# Patient Record
Sex: FEMALE | Race: WHITE | Employment: UNEMPLOYED | URBAN - METROPOLITAN AREA
[De-identification: names, ages, dates, MRNs, and addresses within clinical notes are randomized per-mention and may not be internally consistent; named-entity substitution may affect disease eponyms.]

---

## 2017-01-05 DIAGNOSIS — F41.9 ANXIETY: ICD-10-CM

## 2017-01-09 DIAGNOSIS — F41.9 ANXIETY: ICD-10-CM

## 2017-01-09 NOTE — TELEPHONE ENCOUNTER
Michael Merchant    [phone]    Ms. Sewell is checking the status of her Xanax refill. She   States that [employer] is sending another request. She  Had to go all weekend without her medicine. She states that she needs this to be filled today.

## 2017-01-09 NOTE — TELEPHONE ENCOUNTER
LOV 08/10/2016  NOV not scheduled    Last several monthly refills have been from NP [address], routed to Dr. Karyn Fraser due to controlled sub-possible need for contract. LOV with  02/08/2016. The Prescription Monitoring Program has been reviewed for recent activity regarding controlled substances for this patient.      12/05/2016 1 12/05/2016 ALPRAZOLAM 0.5 MG TABLET 60.0 30 SO LOPEZ [id number] [facility] (7290) 0  [place] [facility] [place]  11/07/2016 1 11/07/2016 ALPRAZOLAM 0.5 MG TABLET 60 30 So Lopez [id number] [facility] (7290) [place] [facility] [place]  10/07/2016 1 10/07/2016 ALPRAZOLAM 0.5 MG TABLET 60 30 So Lopez [id number] [facility] (7290) [place]  09/06/2016 1 09/06/2016 ALPRAZOLAM 0.5 MG TABLET 60 30 So Lopez [id number] [facility] (7290) [place]  08/10/2016 1 08/10/2016 ALPRAZOLAM 0.5 MG TABLET 60 30 So Lopez [id number] [facility] (7290) [place]  [place]  06/27/2016 1 05/31/2016 ALPRAZOLAM 0.5 MG TABLET 60 30 [facility] [id number] [facility] (7290) [place]  [place]

## 2017-01-10 RX ORDER — ALPRAZOLAM 0.5 MG/1
TABLET ORAL
Qty: 60 TAB | Refills: 0 | Status: SHIPPED | OUTPATIENT
Start: 2017-01-10 | End: 2017-01-13 | Stop reason: SDUPTHER

## 2017-01-10 RX ORDER — ALPRAZOLAM 0.5 MG/1
TABLET ORAL
Qty: 60 TAB | Refills: 0 | Status: SHIPPED | OUTPATIENT
Start: 2017-01-10 | End: 2017-02-06 | Stop reason: SDUPTHER

## 2017-02-06 DIAGNOSIS — F41.9 ANXIETY: ICD-10-CM

## 2017-02-06 RX ORDER — ALPRAZOLAM 0.5 MG/1
TABLET ORAL
Qty: 60 TAB | Refills: 0 | Status: SHIPPED | OUTPATIENT
Start: 2017-02-06 | End: 2017-03-07 | Stop reason: SDUPTHER

## 2017-03-07 DIAGNOSIS — F41.9 ANXIETY: ICD-10-CM

## 2017-03-07 RX ORDER — ALPRAZOLAM 0.5 MG/1
TABLET ORAL
Qty: 60 TAB | Refills: 0 | Status: SHIPPED | OUTPATIENT
Start: 2017-03-07 | End: 2017-04-05 | Stop reason: SDUPTHER

## 2017-03-27 ENCOUNTER — TELEPHONE (OUTPATIENT)
Dept: FAMILY MEDICINE CLINIC | Age: 47
End: 2017-03-27

## 2017-03-27 RX ORDER — ESCITALOPRAM OXALATE 20 MG/1
TABLET ORAL
Qty: 90 TAB | Refills: 0 | Status: SHIPPED | OUTPATIENT
Start: 2017-03-27 | End: 2017-07-09 | Stop reason: SDUPTHER

## 2017-03-27 NOTE — TELEPHONE ENCOUNTER
[phone] spoke to patient advised that only thing we have for Dr Alexis Amaya is 4/5/2017 at 5:45p offer appointment with another provider but declined per patient if her symptoms get worse she will go to ER but want to see Dr Alexis Amaya 4/5/2017

## 2017-03-27 NOTE — TELEPHONE ENCOUNTER
Joleen Leong  [phone]    Serena's sister, Alek Fernández (on the hipaa) , called. She states that Vika Adams has been trying to get in with Dr. Cherelle Pacheco. She tried calling at 7 am hoping to get a same day sick appointment but wasn't able. She states her sister has a cough, a sore throat, fever and chills. She recently recovered from lung cancer and only wants to see Dr. Cherelle Pacheco. She was offered an appointment with another provider but declined. She is requesting a work in appointment.

## 2017-04-05 ENCOUNTER — OFFICE VISIT (OUTPATIENT)
Dept: FAMILY MEDICINE CLINIC | Age: 47
End: 2017-04-05

## 2017-04-05 VITALS
OXYGEN SATURATION: 95 % | WEIGHT: 159.8 LBS | HEART RATE: 85 BPM | SYSTOLIC BLOOD PRESSURE: 136 MMHG | TEMPERATURE: 98.6 F | HEIGHT: 64 IN | DIASTOLIC BLOOD PRESSURE: 76 MMHG | RESPIRATION RATE: 15 BRPM | BODY MASS INDEX: 27.28 KG/M2

## 2017-04-05 DIAGNOSIS — R53.83 MALAISE AND FATIGUE: ICD-10-CM

## 2017-04-05 DIAGNOSIS — E78.00 HYPERCHOLESTEROLEMIA: ICD-10-CM

## 2017-04-05 DIAGNOSIS — I10 HTN (HYPERTENSION), BENIGN: Primary | ICD-10-CM

## 2017-04-05 DIAGNOSIS — Z12.31 VISIT FOR SCREENING MAMMOGRAM: ICD-10-CM

## 2017-04-05 DIAGNOSIS — F41.9 ANXIETY: ICD-10-CM

## 2017-04-05 DIAGNOSIS — R53.81 MALAISE AND FATIGUE: ICD-10-CM

## 2017-04-05 DIAGNOSIS — Z72.0 TOBACCO ABUSE: ICD-10-CM

## 2017-04-05 RX ORDER — ALPRAZOLAM 0.5 MG/1
TABLET ORAL
Qty: 60 TAB | Refills: 2 | Status: SHIPPED | OUTPATIENT
Start: 2017-04-05 | End: 2017-06-21 | Stop reason: SDUPTHER

## 2017-04-05 RX ORDER — LISINOPRIL 10 MG/1
10 TABLET ORAL DAILY
Qty: 30 TAB | Refills: 5 | Status: SHIPPED | OUTPATIENT
Start: 2017-04-05 | End: 2017-11-11 | Stop reason: SDUPTHER

## 2017-04-05 RX ORDER — SIMVASTATIN 40 MG/1
40 TABLET, FILM COATED ORAL
Qty: 30 TAB | Refills: 0 | Status: SHIPPED | OUTPATIENT
Start: 2017-04-05 | End: 2017-05-10 | Stop reason: SDUPTHER

## 2017-04-05 NOTE — PROGRESS NOTES
Chief Complaint   Patient presents with    Hypertension    Cholesterol Problem       1. Have you been to the ER, urgent care clinic since your last visit? Hospitalized since your last visit? No    2. Have you seen or consulted any other health care providers outside of the [facility] since your last visit? Include any pap smears or colon screening. No    Body mass index is 27.43 kg/(m^2).     PHQ 2 / 9, over the last two weeks 4/5/2017   Little interest or pleasure in doing things Not at all   Feeling down, depressed or hopeless Not at all   Total Score PHQ 2 0

## 2017-04-05 NOTE — MR AVS SNAPSHOT
Visit Information Date & Time Provider Department Dept. Phone Encounter #  
 4/5/2017  5:45 PM Loyda Cortez MD [address] [place] [address] [phone] [id number] Follow-up Instructions Return in about 1 month (around 5/5/2017) for hypertension follow up. Upcoming Health Maintenance Date Due Pneumococcal 19-64 Highest Risk (1 of 3 - PCV13) 4/5/2021* PAP AKA CERVICAL CYTOLOGY 2/8/2019 DTaP/Tdap/Td series (2 - Td) 8/10/2026 *Topic was postponed. The date shown is not the original due date. Allergies as of 4/5/2017  Review Complete On: 4/5/2017 By: Loyda Cortez MD  
  
 Severity Noted Reaction Type Reactions Amoxil [Amoxicillin]  02/06/2013    Diarrhea Codeine  01/11/2011    Nausea and Vomiting Current Immunizations  Reviewed on 4/17/2013 Name Date Td 2/10/2014 Not reviewed this visit You Were Diagnosed With   
  
 Codes Comments HTN (hypertension), benign    -  Primary ICD-10-CM: I10 
ICD-9-CM: 401.1 Hypercholesterolemia     ICD-10-CM: E78.00 ICD-9-CM: 272.0 Tobacco abuse     ICD-10-CM: Z72.0 ICD-9-CM: 305.1 Anxiety     ICD-10-CM: F41.9 ICD-9-CM: 300.00 Visit for screening mammogram     ICD-10-CM: Z12.31 
ICD-9-CM: V76.12 Malaise and fatigue     ICD-10-CM: R53.81, R53.83 ICD-9-CM: 780.79 Vitals BP Pulse Temp Resp Height(growth percentile) Weight(growth percentile) 136/76 85 98.6 °F (37 °C) (Oral) 15 5' 4\" (1.626 m) 159 lb 12.8 oz (72.5 kg) SpO2 BMI OB Status Smoking Status 95% 27.43 kg/m2 Hysterectomy Current Every Day Smoker Vitals History BMI and BSA Data Body Mass Index Body Surface Area  
 27.43 kg/m 2 1.81 m 2 Preferred Pharmacy Pharmacy Name Phone [address], [facility] [address] [phone] Your Updated Medication List  
  
   
 This list is accurate as of: 4/5/17  6:39 PM.  Always use your most recent med list.  
  
  
  
  
 ALPRAZolam 0.5 mg tablet Commonly known as:  XANAX  
TAKE 1 TABLET BY MOUTH TWICE DAILY AS NEEDED FOR ANXIETY  
  
 aspirin 325 mg tablet Commonly known as:  ASPIRIN Take 325 mg by mouth daily. diclofenac EC 75 mg EC tablet Commonly known as:  VOLTAREN  
TAKE 1 TABLET BY MOUTH EVERY DAY  
  
 escitalopram oxalate 20 mg tablet Commonly known as:  Jimmye Davidson TAKE 1 TABLET BY MOUTH EVERY DAY FOR ANXIETY  
  
 fenofibrate micronized 134 mg capsule Commonly known as:  LOFIBRA TAKE 1 CAPSULE BY MOUTH EVERY DAY  
  
 lisinopril 10 mg tablet Commonly known as:  Tanda Limerick Take 1 Tab by mouth daily. metoclopramide HCl 5 mg tablet Commonly known as:  REGLAN  
TAKE 1 TABLET BY MOUTH THREE TIMES DAILY BEFORE MEALS  
  
 omeprazole 20 mg capsule Commonly known as:  PriLOSEC Take 1 Cap by mouth daily. prochlorperazine 10 mg tablet Commonly known as:  COMPAZINE Take 1 Tab by mouth every six (6) hours as needed for Nausea. simvastatin 40 mg tablet Commonly known as:  ZOCOR Take 1 Tab by mouth nightly. Prescriptions Printed Refills ALPRAZolam (XANAX) 0.5 mg tablet 2 Sig: TAKE 1 TABLET BY MOUTH TWICE DAILY AS NEEDED FOR ANXIETY Class: Print Prescriptions Sent to Pharmacy Refills  
 simvastatin (ZOCOR) 40 mg tablet 0 Sig: Take 1 Tab by mouth nightly. Class: Normal  
 Pharmacy: [address] Ph #: [phone] Route: Oral  
 lisinopril (PRINIVIL, ZESTRIL) 10 mg tablet 5 Sig: Take 1 Tab by mouth daily. Class: Normal  
 [address] Ph #: [phone] Route: Oral  
  
We Performed the Following CBC WITH AUTOMATED DIFF [02859 CPT(R)] LIPID PANEL [15770 CPT(R)] METABOLIC PANEL, COMPREHENSIVE [38838 CPT(R)] T4, FREE [id number] CPT(R)] TSH 3RD GENERATION [49072 CPT(R)] Follow-up Instructions Return in about 1 month (around 5/5/2017) for hypertension follow up. To-Do List   
 04/05/2017 Imaging:  [facility] MAMMO BI SCREENING INCL CAD Introducing [facility]! Desire Juan introduces [employer] patient portal. Now you can access parts of your medical record, email your doctor's office, and request medication refills online. 1. In your internet browser, go to [email or web address] 2. Click on the First Time User? Click Here link in the Sign In box. You will see the New Member Sign Up page. 3. Enter your [employer] Access Code exactly as it appears below. You will not need to use this code after youve completed the sign-up process. If you do not sign up before the expiration date, you must request a new code. · [employer] Access Code: [email or web address] Expires: 7/4/2017  5:45 PM 
 
4. Enter the last four digits of your Social Security Number (xxxx) and Date of Birth (mm/dd/yyyy) as indicated and click Submit. You will be taken to the next sign-up page. 5. Create a [employer] ID. This will be your [employer] login ID and cannot be changed, so think of one that is secure and easy to remember. 6. Create a [employer] password. You can change your password at any time. 7. Enter your Password Reset Question and Answer. This can be used at a later time if you forget your password. 8. Enter your e-mail address. You will receive e-mail notification when new information is available in [address] 9. Click Sign Up. You can now view and download portions of your medical record. 10. Click the Download Summary menu link to download a portable copy of your medical information.  
 
If you have questions, please visit the Frequently Asked Questions section of the [employer]. Remember, [employer] is NOT to be used for urgent needs. For medical emergencies, dial 911. Now available from your iPhone and Android! Please provide this summary of care documentation to your next provider. Your primary care clinician is listed as Ezio Vu. If you have any questions after today's visit, please call [phone].
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
## 2017-05-10 DIAGNOSIS — E78.00 HYPERCHOLESTEROLEMIA: ICD-10-CM

## 2017-05-11 RX ORDER — SIMVASTATIN 40 MG/1
TABLET, FILM COATED ORAL
Qty: 30 TAB | Refills: 0 | Status: SHIPPED | OUTPATIENT
Start: 2017-05-11 | End: 2017-06-07 | Stop reason: SDUPTHER

## 2017-05-16 ENCOUNTER — DOCUMENTATION ONLY (OUTPATIENT)
Dept: FAMILY MEDICINE CLINIC | Age: 47
End: 2017-05-16

## 2017-06-07 DIAGNOSIS — E78.00 HYPERCHOLESTEROLEMIA: ICD-10-CM

## 2017-06-08 RX ORDER — SIMVASTATIN 40 MG/1
TABLET, FILM COATED ORAL
Qty: 30 TAB | Refills: 0 | Status: SHIPPED | OUTPATIENT
Start: 2017-06-08 | End: 2017-07-09 | Stop reason: SDUPTHER

## 2017-06-15 RX ORDER — METOCLOPRAMIDE 5 MG/1
TABLET ORAL
Qty: 90 TAB | Refills: 0 | Status: SHIPPED | OUTPATIENT
Start: 2017-06-15 | End: 2017-08-21 | Stop reason: SDUPTHER

## 2017-06-21 DIAGNOSIS — F41.9 ANXIETY: ICD-10-CM

## 2017-06-22 RX ORDER — ALPRAZOLAM 0.5 MG/1
TABLET ORAL
Qty: 60 TAB | Refills: 0 | Status: SHIPPED | OUTPATIENT
Start: 2017-06-22 | End: 2017-09-05 | Stop reason: SDUPTHER

## 2017-06-22 RX ORDER — DICLOFENAC SODIUM 75 MG/1
75 TABLET, DELAYED RELEASE ORAL DAILY
Qty: 30 TAB | Refills: 5 | Status: SHIPPED | OUTPATIENT
Start: 2017-06-22 | End: 2018-09-17 | Stop reason: SDUPTHER

## 2017-07-09 DIAGNOSIS — E78.00 HYPERCHOLESTEROLEMIA: ICD-10-CM

## 2017-07-11 RX ORDER — SIMVASTATIN 40 MG/1
TABLET, FILM COATED ORAL
Qty: 30 TAB | Refills: 0 | Status: SHIPPED | OUTPATIENT
Start: 2017-07-11 | End: 2017-08-09 | Stop reason: SDUPTHER

## 2017-07-11 RX ORDER — ESCITALOPRAM OXALATE 20 MG/1
TABLET ORAL
Qty: 90 TAB | Refills: 1 | Status: SHIPPED | OUTPATIENT
Start: 2017-07-11 | End: 2018-01-15 | Stop reason: SDUPTHER

## 2017-08-21 ENCOUNTER — OFFICE VISIT (OUTPATIENT)
Dept: FAMILY MEDICINE CLINIC | Age: 47
End: 2017-08-21

## 2017-08-21 VITALS
DIASTOLIC BLOOD PRESSURE: 70 MMHG | WEIGHT: 165.6 LBS | HEIGHT: 64 IN | RESPIRATION RATE: 16 BRPM | SYSTOLIC BLOOD PRESSURE: 115 MMHG | HEART RATE: 85 BPM | OXYGEN SATURATION: 97 % | TEMPERATURE: 97.7 F | BODY MASS INDEX: 28.27 KG/M2

## 2017-08-21 DIAGNOSIS — K21.9 GASTROESOPHAGEAL REFLUX DISEASE WITHOUT ESOPHAGITIS: ICD-10-CM

## 2017-08-21 DIAGNOSIS — I25.83 CORONARY ARTERY DISEASE DUE TO LIPID RICH PLAQUE: ICD-10-CM

## 2017-08-21 DIAGNOSIS — R53.81 MALAISE AND FATIGUE: ICD-10-CM

## 2017-08-21 DIAGNOSIS — C34.11 MALIGNANT NEOPLASM OF UPPER LOBE OF RIGHT LUNG (HCC): ICD-10-CM

## 2017-08-21 DIAGNOSIS — Z72.0 TOBACCO ABUSE: ICD-10-CM

## 2017-08-21 DIAGNOSIS — E66.3 OVER WEIGHT: ICD-10-CM

## 2017-08-21 DIAGNOSIS — E27.8 ADRENAL MASS (HCC): ICD-10-CM

## 2017-08-21 DIAGNOSIS — I10 HTN (HYPERTENSION), BENIGN: Primary | ICD-10-CM

## 2017-08-21 DIAGNOSIS — F32.89 DEPRESSIVE DISORDER, NOT ELSEWHERE CLASSIFIED: ICD-10-CM

## 2017-08-21 DIAGNOSIS — E78.00 HYPERCHOLESTEROLEMIA: ICD-10-CM

## 2017-08-21 DIAGNOSIS — R53.83 MALAISE AND FATIGUE: ICD-10-CM

## 2017-08-21 DIAGNOSIS — R51.9 INTRACTABLE HEADACHE, UNSPECIFIED CHRONICITY PATTERN, UNSPECIFIED HEADACHE TYPE: ICD-10-CM

## 2017-08-21 DIAGNOSIS — F41.9 ANXIETY: ICD-10-CM

## 2017-08-21 DIAGNOSIS — I25.10 CORONARY ARTERY DISEASE DUE TO LIPID RICH PLAQUE: ICD-10-CM

## 2017-08-21 RX ORDER — METOCLOPRAMIDE 5 MG/1
TABLET ORAL
Qty: 90 TAB | Refills: 5 | Status: SHIPPED | OUTPATIENT
Start: 2017-08-21 | End: 2018-11-28 | Stop reason: SDUPTHER

## 2017-08-21 NOTE — PROGRESS NOTES
Chief Complaint   Patient presents with    Hypertension     follow up     Cholesterol Problem     follow up     1. Have you been to the ER, urgent care clinic since your last visit? Hospitalized since your last visit? No    2. Have you seen or consulted any other health care providers outside of the [address] [place] [address] since your last visit? Include any pap smears or colon screening.  Yes Ovidio Mai Dr. Johnson Rinne    Fasting
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
## 2017-08-21 NOTE — MR AVS SNAPSHOT
Visit Information Date & Time Provider Department Dept. Phone Encounter #  
 8/21/2017  9:45 AM Tiffanie Davidson MD [facility] [phone] [id number] Follow-up Instructions Return in about 6 months (around 2/21/2018) for hypertension follow up, hyperlipidemia follow up. Upcoming Health Maintenance Date Due Pneumococcal 19-64 Highest Risk (1 of 3 - PCV13) 4/5/2021* PAP AKA CERVICAL CYTOLOGY 2/8/2019 DTaP/Tdap/Td series (2 - Td) 8/10/2026 *Topic was postponed. The date shown is not the original due date. Allergies as of 8/21/2017  Review Complete On: 8/21/2017 By: Tiffanie Davidson MD  
  
 Severity Noted Reaction Type Reactions Amoxil [Amoxicillin]  02/06/2013    Diarrhea Codeine  01/11/2011    Nausea and Vomiting Current Immunizations  Reviewed on 4/17/2013 Name Date Td 2/10/2014 Not reviewed this visit You Were Diagnosed With   
  
 Codes Comments HTN (hypertension), benign    -  Primary ICD-10-CM: I10 
ICD-9-CM: 401.1 Hypercholesterolemia     ICD-10-CM: E78.00 ICD-9-CM: 272.0 Coronary artery disease due to lipid rich plaque     ICD-10-CM: I25.10, I25.83 ICD-9-CM: 414.00, 414.3 Depressive disorder, not elsewhere classified     ICD-10-CM: F32.9 ICD-9-CM: 243 Anxiety     ICD-10-CM: F41.9 ICD-9-CM: 300.00 Tobacco abuse     ICD-10-CM: Z72.0 ICD-9-CM: 305.1 Malignant neoplasm of upper lobe of right lung [facility])     ICD-10-CM: C34.11 ICD-9-CM: 162.3 Adrenal mass ([facility] [address]     ICD-10-CM: E27.9 ICD-9-CM: 255.9 Gastroesophageal reflux disease without esophagitis     ICD-10-CM: K21.9 ICD-9-CM: 530.81 Malaise and fatigue     ICD-10-CM: R53.81, R53.83 ICD-9-CM: 780.79 Intractable headache, unspecified chronicity pattern, unspecified headache type     ICD-10-CM: R51 ICD-9-CM: 784.0 Over weight     ICD-10-CM: U49.3 ICD-9-CM: 278.02 Vitals BP Pulse Temp Resp Height(growth percentile) Weight(growth percentile) 115/70 (BP 1 Location: Left arm, BP Patient Position: Sitting) 85 97.7 °F (36.5 °C) (Oral) 16 5' 4\" (1.626 m) 165 lb 9.6 oz (75.1 kg) SpO2 BMI OB Status Smoking Status 97% 28.43 kg/m2 Hysterectomy Current Every Day Smoker Vitals History BMI and BSA Data Body Mass Index Body Surface Area  
 28.43 kg/m 2 1.84 m 2 Preferred Pharmacy Pharmacy Name Phone [address], Formerly named [facility] [address] [phone] Your Updated Medication List  
  
   
This list is accurate as of: 8/21/17 11:03 AM.  Always use your most recent med list.  
  
  
  
  
 ALPRAZolam 0.5 mg tablet Commonly known as:  XANAX  
TAKE 1 TABLET BY MOUTH TWICE DAILY AS NEEDED FOR ANXIETY  
  
 aspirin 325 mg tablet Commonly known as:  ASPIRIN Take 325 mg by mouth daily. diclofenac EC 75 mg EC tablet Commonly known as:  VOLTAREN Take 1 Tab by mouth daily. escitalopram oxalate 20 mg tablet Commonly known as:  Jose G Snow TAKE 1 TABLET BY MOUTH EVERY DAY AS NEEDED FOR ANXIETY  
  
 lisinopril 10 mg tablet Commonly known as:  Shalini Ann Take 1 Tab by mouth daily. metoclopramide HCl 5 mg tablet Commonly known as:  REGLAN  
TAKE 1 TABLET BY MOUTH THREE TIMES DAILY BEFORE MEALS  
  
 omeprazole 20 mg capsule Commonly known as:  PriLOSEC Take 1 Cap by mouth daily. prochlorperazine 10 mg tablet Commonly known as:  COMPAZINE Take 1 Tab by mouth every six (6) hours as needed for Nausea. simvastatin 40 mg tablet Commonly known as:  ZOCOR  
TAKE 1 TABLET BY MOUTH EVERY NIGHT Prescriptions Sent to Pharmacy Refills  
 metoclopramide HCl (REGLAN) 5 mg tablet 5 Sig: TAKE 1 TABLET BY MOUTH THREE TIMES DAILY BEFORE MEALS  Class: Normal  
 Pharmacy: [address] [place] [address] [address] Ph #: [phone] We Performed the Following CBC WITH AUTOMATED DIFF [29211 CPT(R)] LIPID PANEL [19223 CPT(R)] METABOLIC PANEL, COMPREHENSIVE [87126 CPT(R)] T4, FREE [id number] CPT(R)] TSH 3RD GENERATION [51429 CPT(R)] Follow-up Instructions Return in about 6 months (around 2/21/2018) for hypertension follow up, hyperlipidemia follow up. To-Do List   
 08/21/2017 Imaging:  CT HEAD WO CONT Referral Information Referral ID Referred By Referred To  
  
 [id number] Sheela Conde Not Available Visits Status Start Date End Date 1 New Request 8/21/17 8/21/18 If your referral has a status of pending review or denied, additional information will be sent to support the outcome of this decision. Patient Instructions Learning About Coronary Artery Disease (CAD) What is coronary artery disease? Coronary artery disease (CAD) occurs when plaque builds up in the arteries that bring oxygen-rich blood to your heart. Plaque is a fatty substance made of cholesterol, calcium, and other substances in the blood. This process is called hardening of the arteries, or atherosclerosis. What happens when you have coronary artery disease? · Plaque may narrow the coronary arteries. Narrowed arteries cause poor blood flow. This can lead to angina symptoms such as chest pain or discomfort. If blood flow is completely blocked, you could have a heart attack. · You can slow CAD and reduce the risk of future problems by making changes in your lifestyle. These include quitting smoking and eating heart-healthy foods. · Treatments for CAD, along with changes in your lifestyle, can help you live a longer and healthier life. How can you prevent coronary artery disease? · Do not smoke. It may be the best thing you can do to prevent heart disease.  If you need help quitting, talk to your doctor about stop-smoking programs and medicines. These can increase your chances of quitting for good. · Be active. Get at least 30 minutes of exercise on most days of the week. Walking is a good choice. You also may want to do other activities, such as running, swimming, cycling, or playing tennis or team sports. · Eat heart-healthy foods. Eat more fruits and vegetables and less foods that contain saturated and trans fats. Limit alcohol, sodium, and sweets. · Stay at a healthy weight. Lose weight if you need to. · Manage other health problems such as diabetes, high blood pressure, and high cholesterol. · Manage stress. Stress can hurt your heart. To keep stress low, talk about your problems and feelings. Don't keep your feelings hidden. · If you have talked about it with your doctor, take a low-dose aspirin every day. Aspirin can help certain people lower their risk of a heart attack or stroke. But taking aspirin isn't right for everyone, because it can cause serious bleeding. Do not start taking daily aspirin unless your doctor knows about it. How is coronary artery disease treated? · Your doctor will suggest that you make lifestyle changes. For example, your doctor may ask you to eat healthy foods, quit smoking, lose extra weight, and be more active. · You will have to take medicines. · Your doctor may suggest a procedure to open narrowed or blocked arteries. This is called angioplasty. Or your doctor may suggest using healthy blood vessels to create detours around narrowed or blocked arteries. This is called bypass surgery. Follow-up care is a key part of your treatment and safety. Be sure to make and go to all appointments, and call your doctor if you are having problems. It's also a good idea to know your test results and keep a list of the medicines you take. Where can you learn more? Go to http://yuliya-bryan.info/.  
Enter [phone] in the search box to learn more about \"Learning About Coronary Artery Disease (CAD). \" Current as of: December 28, 2016 Content Version: 11.3 © 5104-8952 Ecelles Carson. Care instructions adapted under license by [employer] (which disclaims liability or warranty for this information). If you have questions about a medical condition or this instruction, always ask your healthcare professional. [address]  any warranty or liability for your use of this information. Introducing [facility]! Mary Arciniega introduces [employer] patient portal. Now you can access parts of your medical record, email your doctor's office, and request medication refills online. 1. In your internet browser, go to [email or web address] 2. Click on the First Time User? Click Here link in the Sign In box. You will see the New Member Sign Up page. 3. Enter your [employer] Access Code exactly as it appears below. You will not need to use this code after youve completed the sign-up process. If you do not sign up before the expiration date, you must request a new code. · [employer] Access Code: [email or web address] Expires: 11/19/2017 10:55 AM 
 
4. Enter the last four digits of your Social Security Number (xxxx) and Date of Birth (mm/dd/yyyy) as indicated and click Submit. You will be taken to the next sign-up page. 5. Create a [employer] ID. This will be your [employer] login ID and cannot be changed, so think of one that is secure and easy to remember. 6. Create a [employer] password. You can change your password at any time. 7. Enter your Password Reset Question and Answer. This can be used at a later time if you forget your password. 8. Enter your e-mail address. You will receive e-mail notification when new information is available in [address] 9. Click Sign Up. You can now view and download portions of your medical record. 10. Click the Download Summary menu link to download a portable copy of your medical information. If you have questions, please visit the Frequently Asked Questions section of the [employer] website. Remember, [employer] is NOT to be used for urgent needs. For medical emergencies, dial 911. Now available from your iPhone and Android! Please provide this summary of care documentation to your next provider. Your primary care clinician is listed as Hemalatha Wyatt. If you have any questions after today's visit, please call [phone].

## 2017-08-21 NOTE — PATIENT INSTRUCTIONS
Learning About Coronary Artery Disease (CAD)  What is coronary artery disease? Coronary artery disease (CAD) occurs when plaque builds up in the arteries that bring oxygen-rich blood to your heart. Plaque is a fatty substance made of cholesterol, calcium, and other substances in the blood. This process is called hardening of the arteries, or atherosclerosis. What happens when you have coronary artery disease? · Plaque may narrow the coronary arteries. Narrowed arteries cause poor blood flow. This can lead to angina symptoms such as chest pain or discomfort. If blood flow is completely blocked, you could have a heart attack. · You can slow CAD and reduce the risk of future problems by making changes in your lifestyle. These include quitting smoking and eating heart-healthy foods. · Treatments for CAD, along with changes in your lifestyle, can help you live a longer and healthier life. How can you prevent coronary artery disease? · Do not smoke. It may be the best thing you can do to prevent heart disease. If you need help quitting, talk to your doctor about stop-smoking programs and medicines. These can increase your chances of quitting for good. · Be active. Get at least 30 minutes of exercise on most days of the week. Walking is a good choice. You also may want to do other activities, such as running, swimming, cycling, or playing tennis or team sports. · Eat heart-healthy foods. Eat more fruits and vegetables and less foods that contain saturated and trans fats. Limit alcohol, sodium, and sweets. · Stay at a healthy weight. Lose weight if you need to. · Manage other health problems such as diabetes, high blood pressure, and high cholesterol. · Manage stress. Stress can hurt your heart. To keep stress low, talk about your problems and feelings. Don't keep your feelings hidden. · If you have talked about it with your doctor, take a low-dose aspirin every day.  Aspirin can help certain people lower their risk of a heart attack or stroke. But taking aspirin isn't right for everyone, because it can cause serious bleeding. Do not start taking daily aspirin unless your doctor knows about it. How is coronary artery disease treated? · Your doctor will suggest that you make lifestyle changes. For example, your doctor may ask you to eat healthy foods, quit smoking, lose extra weight, and be more active. · You will have to take medicines. · Your doctor may suggest a procedure to open narrowed or blocked arteries. This is called angioplasty. Or your doctor may suggest using healthy blood vessels to create detours around narrowed or blocked arteries. This is called bypass surgery. Follow-up care is a key part of your treatment and safety. Be sure to make and go to all appointments, and call your doctor if you are having problems. It's also a good idea to know your test results and keep a list of the medicines you take. Where can you learn more? Go to http://yuliya-bryan.info/. Enter [phone] in the search box to learn more about \"Learning About Coronary Artery Disease (CAD). \"  Current as of: December 28, 2016  Content Version: 11.3  © 8207-4266 [employer]. Care instructions adapted under license by [employer] (which disclaims liability or warranty for this information). If you have questions about a medical condition or this instruction, always ask your healthcare professional. [place] [address] any warranty or liability for your use of this information.

## 2017-08-22 LAB
ALBUMIN SERPL-MCNC: 4.1 G/DL (ref 3.5–5.5)
ALBUMIN/GLOB SERPL: 1.5 {RATIO} (ref 1.2–2.2)
ALP SERPL-CCNC: 74 IU/L (ref 39–117)
ALT SERPL-CCNC: 15 IU/L (ref 0–32)
AST SERPL-CCNC: 21 IU/L (ref 0–40)
BASOPHILS # BLD AUTO: 0 X10E3/UL (ref 0–0.2)
BASOPHILS NFR BLD AUTO: 0 %
BILIRUB SERPL-MCNC: <0.2 MG/DL (ref 0–1.2)
BUN SERPL-MCNC: 8 MG/DL (ref 6–24)
BUN/CREAT SERPL: 10 (ref 9–23)
CALCIUM SERPL-MCNC: 8.9 MG/DL (ref 8.7–10.2)
CHLORIDE SERPL-SCNC: 96 MMOL/L (ref 96–106)
CHOLEST SERPL-MCNC: 149 MG/DL (ref 100–199)
CO2 SERPL-SCNC: 25 MMOL/L (ref 18–29)
CREAT SERPL-MCNC: 0.81 MG/DL (ref 0.57–1)
EOSINOPHIL # BLD AUTO: 0 X10E3/UL (ref 0–0.4)
EOSINOPHIL NFR BLD AUTO: 0 %
ERYTHROCYTE [DISTWIDTH] IN BLOOD BY AUTOMATED COUNT: 13.1 % (ref 12.3–15.4)
GLOBULIN SER CALC-MCNC: 2.8 G/DL (ref 1.5–4.5)
GLUCOSE SERPL-MCNC: 80 MG/DL (ref 65–99)
HCT VFR BLD AUTO: 43.8 % (ref 34–46.6)
HDLC SERPL-MCNC: 54 MG/DL
HGB BLD-MCNC: 14.1 G/DL (ref 11.1–15.9)
IMM GRANULOCYTES # BLD: 0 X10E3/UL (ref 0–0.1)
IMM GRANULOCYTES NFR BLD: 0 %
INTERPRETATION, 910389: NORMAL
LDLC SERPL CALC-MCNC: 71 MG/DL (ref 0–99)
LYMPHOCYTES # BLD AUTO: 1.5 X10E3/UL (ref 0.7–3.1)
LYMPHOCYTES NFR BLD AUTO: 22 %
MCH RBC QN AUTO: 31.6 PG (ref 26.6–33)
MCHC RBC AUTO-ENTMCNC: 32.2 G/DL (ref 31.5–35.7)
MCV RBC AUTO: 98 FL (ref 79–97)
MONOCYTES # BLD AUTO: 0.4 X10E3/UL (ref 0.1–0.9)
MONOCYTES NFR BLD AUTO: 6 %
NEUTROPHILS # BLD AUTO: 4.8 X10E3/UL (ref 1.4–7)
NEUTROPHILS NFR BLD AUTO: 72 %
PLATELET # BLD AUTO: 137 X10E3/UL (ref 150–379)
POTASSIUM SERPL-SCNC: 5 MMOL/L (ref 3.5–5.2)
PROT SERPL-MCNC: 6.9 G/DL (ref 6–8.5)
RBC # BLD AUTO: 4.46 X10E6/UL (ref 3.77–5.28)
SODIUM SERPL-SCNC: 137 MMOL/L (ref 134–144)
T4 FREE SERPL-MCNC: 0.94 NG/DL (ref 0.82–1.77)
TRIGL SERPL-MCNC: 122 MG/DL (ref 0–149)
TSH SERPL DL<=0.005 MIU/L-ACNC: 1.75 UIU/ML (ref 0.45–4.5)
VLDLC SERPL CALC-MCNC: 24 MG/DL (ref 5–40)
WBC # BLD AUTO: 6.7 X10E3/UL (ref 3.4–10.8)

## 2017-08-28 ENCOUNTER — TELEPHONE (OUTPATIENT)
Dept: FAMILY MEDICINE CLINIC | Age: 47
End: 2017-08-28

## 2017-08-28 ENCOUNTER — HOSPITAL ENCOUNTER (OUTPATIENT)
Dept: CT IMAGING | Age: 47
Discharge: HOME | End: 2017-08-28
Attending: FAMILY MEDICINE
Payer: COMMERCIAL

## 2017-08-28 DIAGNOSIS — C34.11 MALIGNANT NEOPLASM OF UPPER LOBE OF RIGHT LUNG (HCC): ICD-10-CM

## 2017-08-28 DIAGNOSIS — R51.9 INTRACTABLE HEADACHE, UNSPECIFIED CHRONICITY PATTERN, UNSPECIFIED HEADACHE TYPE: ICD-10-CM

## 2017-08-28 PROCEDURE — 70450 CT HEAD/BRAIN W/O DYE: CPT

## 2017-08-28 NOTE — PROGRESS NOTES
FINDINGS: There is no acute intracranial hemorrhage, mass, mass effect or  herniation. Ventricular system is normal. The gray-white matter differentiation  is well-preserved. The mastoid air cells are well pneumatized. The visualized  paranasal sinuses are normal. No lytic or sclerotic osseous lesion is  visualized.           Impression            IMPRESSION: No acute intracranial hemorrhage, mass or infarct.

## 2017-08-28 NOTE — TELEPHONE ENCOUNTER
----- Message from Caesar Larios sent at 8/28/2017  1:47 PM EDT -----  Regarding: /telephone  Pt returning a call to the practice. Best contact number is [phone].

## 2017-08-28 NOTE — PROGRESS NOTES
[phone] attempted to call patient no answer left message to call me back in regards to her CT scan results

## 2017-09-07 DIAGNOSIS — F41.9 ANXIETY: ICD-10-CM

## 2017-09-09 DIAGNOSIS — E78.00 HYPERCHOLESTEROLEMIA: ICD-10-CM

## 2017-09-10 DIAGNOSIS — E78.00 HYPERCHOLESTEROLEMIA: ICD-10-CM

## 2017-09-10 RX ORDER — SIMVASTATIN 40 MG/1
TABLET, FILM COATED ORAL
Qty: 30 TAB | Refills: 5 | Status: SHIPPED | OUTPATIENT
Start: 2017-09-10 | End: 2018-10-08 | Stop reason: SDUPTHER

## 2017-09-10 RX ORDER — SIMVASTATIN 40 MG/1
TABLET, FILM COATED ORAL
Qty: 30 TAB | Refills: 0 | Status: SHIPPED | OUTPATIENT
Start: 2017-09-10 | End: 2017-09-10 | Stop reason: SDUPTHER

## 2017-09-12 RX ORDER — ALPRAZOLAM 0.5 MG/1
TABLET ORAL
Qty: 60 TAB | Refills: 0 | OUTPATIENT
Start: 2017-09-12

## 2017-10-11 DIAGNOSIS — E78.00 HYPERCHOLESTEROLEMIA: ICD-10-CM

## 2017-10-11 DIAGNOSIS — F41.9 ANXIETY: ICD-10-CM

## 2017-10-11 RX ORDER — ALPRAZOLAM 0.5 MG/1
TABLET ORAL
Qty: 60 TAB | Refills: 0 | Status: SHIPPED | OUTPATIENT
Start: 2017-10-11 | End: 2017-10-30 | Stop reason: SDUPTHER

## 2017-10-11 RX ORDER — SIMVASTATIN 40 MG/1
TABLET, FILM COATED ORAL
Qty: 30 TAB | Refills: 5 | Status: SHIPPED | OUTPATIENT
Start: 2017-10-11 | End: 2017-10-30 | Stop reason: SDUPTHER

## 2017-10-11 NOTE — TELEPHONE ENCOUNTER
LOV 8/21/2017    The Prescription Monitoring Program has been reviewed for recent activity regarding controlled substances for this patient.      Per  last refill 9/12/2017 #60    MME/D :

## 2017-10-30 ENCOUNTER — OFFICE VISIT (OUTPATIENT)
Dept: FAMILY MEDICINE CLINIC | Age: 47
End: 2017-10-30

## 2017-10-30 VITALS
HEART RATE: 93 BPM | HEIGHT: 64 IN | TEMPERATURE: 97 F | DIASTOLIC BLOOD PRESSURE: 75 MMHG | OXYGEN SATURATION: 97 % | SYSTOLIC BLOOD PRESSURE: 117 MMHG | BODY MASS INDEX: 27.93 KG/M2 | WEIGHT: 163.6 LBS | RESPIRATION RATE: 18 BRPM

## 2017-10-30 DIAGNOSIS — F41.9 ANXIETY: ICD-10-CM

## 2017-10-30 DIAGNOSIS — Z72.0 TOBACCO ABUSE: ICD-10-CM

## 2017-10-30 DIAGNOSIS — M79.661 RIGHT CALF PAIN: Primary | ICD-10-CM

## 2017-10-30 DIAGNOSIS — I73.9 CLAUDICATION OF CALF MUSCLES (HCC): ICD-10-CM

## 2017-10-30 DIAGNOSIS — I10 HTN (HYPERTENSION), BENIGN: ICD-10-CM

## 2017-10-30 DIAGNOSIS — R55 SYNCOPE, UNSPECIFIED SYNCOPE TYPE: ICD-10-CM

## 2017-10-30 RX ORDER — ALPRAZOLAM 0.5 MG/1
TABLET ORAL
Qty: 60 TAB | Refills: 0 | Status: SHIPPED | OUTPATIENT
Start: 2017-11-11 | End: 2017-12-12 | Stop reason: SDUPTHER

## 2017-10-30 RX ORDER — FAMOTIDINE 40 MG/1
TABLET, FILM COATED ORAL
Refills: 3 | COMMUNITY
Start: 2017-10-11 | End: 2018-10-16 | Stop reason: SDUPTHER
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
## 2017-10-30 NOTE — PROGRESS NOTES
No chief complaint on file. Reviewed Record in preparation for visit and have obtained necessary documentation. Identified pt with two pt identifiers (Name @ )    There are no preventive care reminders to display for this patient. 1. Have you been to the ER, urgent care clinic since your last visit? Hospitalized since your last visit? See todays note    2. Have you seen or consulted any other health care providers outside of the [facility] since your last visit? Include any pap smears or colon screening.  No

## 2017-10-30 NOTE — MR AVS SNAPSHOT
Visit Information Date & Time Provider Department Dept. Phone Encounter #  
 10/30/2017  2:15 PM Juarez Bernal  [facility] [phone] [id number] Follow-up Instructions Return if symptoms worsen or fail to improve, for left calf pain. Your Appointments 2/21/2018  8:00 AM  
ROUTINE CARE with Juarez Bernal MD  
[facility]) Appt Note: f/u htn,lipids [address] [phone]  
[phone]  
  
   
 [address] [phone] Upcoming Health Maintenance Date Due Pneumococcal 19-64 Highest Risk (1 of 3 - PCV13) 4/5/2021* PAP AKA CERVICAL CYTOLOGY 2/8/2019 DTaP/Tdap/Td series (2 - Td) 8/10/2026 *Topic was postponed. The date shown is not the original due date. Allergies as of 10/30/2017  Review Complete On: 10/30/2017 By: Juarez Bernal MD  
  
 Severity Noted Reaction Type Reactions Amoxil [Amoxicillin]  02/06/2013    Diarrhea Codeine  01/11/2011    Nausea and Vomiting Current Immunizations  Reviewed on 4/17/2013 Name Date Td 2/10/2014 Not reviewed this visit You Were Diagnosed With   
  
 Codes Comments Right calf pain    -  Primary ICD-10-CM: O62.596 ICD-9-CM: 729.5 Claudication of calf muscles (HCC)     ICD-10-CM: I73.9 ICD-9-CM: 443.9 Syncope, unspecified syncope type     ICD-10-CM: R55 
ICD-9-CM: 780.2 Anxiety     ICD-10-CM: F41.9 ICD-9-CM: 300.00 HTN (hypertension), benign     ICD-10-CM: I10 
ICD-9-CM: 401.1 Tobacco abuse     ICD-10-CM: Z72.0 ICD-9-CM: 305.1 Vitals BP Pulse Temp Resp Height(growth percentile) Weight(growth percentile) 117/75 (BP 1 Location: Left arm, BP Patient Position: Sitting) 93 97 °F (36.1 °C) (Oral) 18 5' 4\" (1.626 m) 163 lb 9.6 oz (74.2 kg) SpO2 BMI OB Status Smoking Status 97% 28.08 kg/m2 Hysterectomy Current Every Day Smoker Vitals History BMI and BSA Data Body Mass Index Body Surface Area 28.08 kg/m 2 1.83 m 2 [facility] [address] [facility] [address] [facility] [address] [phone] Your Updated Medication List  
  
   
This list is accurate as of: 10/30/17  3:13 PM.  Always use your most recent med list.  
  
  
  
  
 ALPRAZolam 0.5 mg tablet Commonly known as:  XANAX  
TAKE 1 TABLET BY MOUTH TWICE DAILY AS NEEDED FOR ANXIETY Start taking on:  11/11/2017  
  
 aspirin 325 mg tablet Commonly known as:  ASPIRIN Take 325 mg by mouth daily. diclofenac EC 75 mg EC tablet Commonly known as:  VOLTAREN Take 1 Tab by mouth daily. escitalopram oxalate 20 mg tablet Commonly known as:  Babylon Keyonna TAKE 1 TABLET BY MOUTH EVERY DAY AS NEEDED FOR ANXIETY  
  
 famotidine 40 mg tablet Commonly known as:  PEPCID  
TAKE 1 T PO QD  
  
 lisinopril 10 mg tablet Commonly known as:  Michaelyn Whittemore Take 1 Tab by mouth daily. metoclopramide HCl 5 mg tablet Commonly known as:  REGLAN  
TAKE 1 TABLET BY MOUTH THREE TIMES DAILY BEFORE MEALS  
  
 omeprazole 20 mg capsule Commonly known as:  PriLOSEC Take 1 Cap by mouth daily. prochlorperazine 10 mg tablet Commonly known as:  COMPAZINE Take 1 Tab by mouth every six (6) hours as needed for Nausea. simvastatin 40 mg tablet Commonly known as:  ZOCOR  
TAKE 1 TABLET BY MOUTH EVERY NIGHT Prescriptions Printed Refills ALPRAZolam (XANAX) 0.5 mg tablet 0 Starting on: 11/11/2017 Sig: TAKE 1 TABLET BY MOUTH TWICE DAILY AS NEEDED FOR ANXIETY Class: Print Follow-up Instructions Return if symptoms worsen or fail to improve, for left calf pain. To-Do List   
 10/30/2017 Imaging:  ANKLE BRACHIAL INDEX   
  
 10/30/2017 Imaging:  DUPLEX CAROTID BILATERAL   
  
 10/30/2017   Imaging:  DUPLEX LOW EXT ARTERY LEFT   
  
  
 Patient Instructions DASH Diet: Care Instructions Your Care Instructions The DASH diet is an eating plan that can help lower your blood pressure. DASH stands for Dietary Approaches to Stop Hypertension. Hypertension is high blood pressure. The DASH diet focuses on eating foods that are high in calcium, potassium, and magnesium. These nutrients can lower blood pressure. The foods that are highest in these nutrients are fruits, vegetables, low-fat dairy products, nuts, seeds, and legumes. But taking calcium, potassium, and magnesium supplements instead of eating foods that are high in those nutrients does not have the same effect. The DASH diet also includes whole grains, fish, and poultry. The DASH diet is one of several lifestyle changes your doctor may recommend to lower your high blood pressure. Your doctor may also want you to decrease the amount of sodium in your diet. Lowering sodium while following the DASH diet can lower blood pressure even further than just the DASH diet alone. Follow-up care is a key part of your treatment and safety. Be sure to make and go to all appointments, and call your doctor if you are having problems. It's also a good idea to know your test results and keep a list of the medicines you take. How can you care for yourself at home? Following the DASH diet · Eat 4 to 5 servings of fruit each day. A serving is 1 medium-sized piece of fruit, ½ cup chopped or canned fruit, 1/4 cup dried fruit, or 4 ounces (½ cup) of fruit juice. Choose fruit more often than fruit juice. · Eat 4 to 5 servings of vegetables each day. A serving is 1 cup of lettuce or raw leafy vegetables, ½ cup of chopped or cooked vegetables, or 4 ounces (½ cup) of vegetable juice. Choose vegetables more often than vegetable juice. · Get 2 to 3 servings of low-fat and fat-free dairy each day. A serving is 8 ounces of milk, 1 cup of yogurt, or 1 ½ ounces of cheese. · Eat 6 to 8 servings of grains each day. A serving is 1 slice of bread, 1 ounce of dry cereal, or ½ cup of cooked rice, pasta, or cooked cereal. Try to choose whole-grain products as much as possible. · Limit lean meat, poultry, and fish to 2 servings each day. A serving is 3 ounces, about the size of a deck of cards. · Eat 4 to 5 servings of nuts, seeds, and legumes (cooked dried beans, lentils, and split peas) each week. A serving is 1/3 cup of nuts, 2 tablespoons of seeds, or ½ cup of cooked beans or peas. · Limit fats and oils to 2 to 3 servings each day. A serving is 1 teaspoon of vegetable oil or 2 tablespoons of salad dressing. · Limit sweets and added sugars to 5 servings or less a week. A serving is 1 tablespoon jelly or jam, ½ cup sorbet, or 1 cup of lemonade. · Eat less than 2,300 milligrams (mg) of sodium a day. If you limit your sodium to 1,500 mg a day, you can lower your blood pressure even more. Tips for success · Start small. Do not try to make dramatic changes to your diet all at once. You might feel that you are missing out on your favorite foods and then be more likely to not follow the plan. Make small changes, and stick with them. Once those changes become habit, add a few more changes. · Try some of the following: ¨ Make it a goal to eat a fruit or vegetable at every meal and at snacks. This will make it easy to get the recommended amount of fruits and vegetables each day. ¨ Try yogurt topped with fruit and nuts for a snack or healthy dessert. ¨ Add lettuce, tomato, cucumber, and onion to sandwiches. ¨ Combine a ready-made pizza crust with low-fat mozzarella cheese and lots of vegetable toppings. Try using tomatoes, squash, spinach, broccoli, carrots, cauliflower, and onions. ¨ Have a variety of cut-up vegetables with a low-fat dip as an appetizer instead of chips and dip. ¨ Sprinkle sunflower seeds or chopped almonds over salads.  Or try adding chopped walnuts or almonds to cooked vegetables. ¨ Try some vegetarian meals using beans and peas. Add garbanzo or kidney beans to salads. Make burritos and tacos with mashed lopez beans or black beans. Where can you learn more? Go to http://yuliya-bryan.info/. Enter I948 in the search box to learn more about \"DASH Diet: Care Instructions. \" Current as of: September 21, 2016 Content Version: 11.4 © 0264-1915 [employer]. Care instructions adapted under license by [employer] (which disclaims liability or warranty for this information). If you have questions about a medical condition or this instruction, always ask your healthcare professional. [address] any warranty or liability for your use of this information. Introducing [facility]! [facility] introduces AppSurfer patient portal. Now you can access parts of your medical record, email your doctor's office, and request medication refills online. 1. In your internet browser, go to [email or web address] 2. Click on the First Time User? Click Here link in the Sign In box. You will see the New Member Sign Up page. 3. Enter your AppSurfer Access Code exactly as it appears below. You will not need to use this code after youve completed the sign-up process. If you do not sign up before the expiration date, you must request a new code. · App[employer]fer Access Code: [email or web address] Expires: 11/19/2017 10:55 AM 
 
4. Enter the last four digits of your Social Security Number (xxxx) and Date of Birth (mm/dd/yyyy) as indicated and click Submit. You will be taken to the next sign-up page. 5. Create a [employer] ID. This will be your AppSurfer login ID and cannot be changed, so think of one that is secure and easy to remember. 6. Create a [employer] password. You can change your password at any time. 7. Enter your Password Reset Question and Answer.  This can be used at a later time if you forget your password. 8. Enter your e-mail address. You will receive e-mail notification when new information is available in [address] 9. Click Sign Up. You can now view and download portions of your medical record. 10. Click the Download Summary menu link to download a portable copy of your medical information. If you have questions, please visit the Frequently Asked Questions section of the [employer] website. Remember, [employer] is NOT to be used for urgent needs. For medical emergencies, dial 911. Now available from your iPhone and Android! Please provide this summary of care documentation to your next provider. Your primary care clinician is listed as Roque Hunter. If you have any questions after today's visit, please call [phone].

## 2017-10-30 NOTE — PATIENT INSTRUCTIONS

## 2017-11-02 ENCOUNTER — TELEPHONE (OUTPATIENT)
Dept: FAMILY MEDICINE CLINIC | Age: 47
End: 2017-11-02

## 2017-11-02 DIAGNOSIS — M79.661 RIGHT CALF PAIN: Primary | ICD-10-CM

## 2017-11-02 NOTE — TELEPHONE ENCOUNTER
Per Dr. Noris Cedillo ok to change order     [phone] spoke to [facility] [place] notified order's been changed

## 2017-11-02 NOTE — TELEPHONE ENCOUNTER
Jose G Baca from [facility] is calling, she would like to request that Dr. Chandra Tobar change the order for DUPLEX LOW EXT ARTERY LEFT to an order for the right leg. The patient claims that it is the right leg that has been hurting him. Jose G Baca states that the patient will be coming in tomorrow for this procedure.      Best call back [employer]: [phone]

## 2017-11-03 ENCOUNTER — HOSPITAL ENCOUNTER (OUTPATIENT)
Dept: VASCULAR SURGERY | Age: 47
Discharge: HOME | End: 2017-11-03
Attending: FAMILY MEDICINE
Payer: COMMERCIAL

## 2017-11-03 DIAGNOSIS — I73.9 CLAUDICATION OF CALF MUSCLES (HCC): ICD-10-CM

## 2017-11-03 DIAGNOSIS — R55 SYNCOPE, UNSPECIFIED SYNCOPE TYPE: ICD-10-CM

## 2017-11-03 DIAGNOSIS — M79.661 RIGHT CALF PAIN: ICD-10-CM

## 2017-11-03 PROCEDURE — 93880 EXTRACRANIAL BILAT STUDY: CPT

## 2017-11-03 PROCEDURE — 93926 LOWER EXTREMITY STUDY: CPT

## 2017-11-03 PROCEDURE — 93922 UPR/L XTREMITY ART 2 LEVELS: CPT

## 2017-11-03 NOTE — PROCEDURES
[facility]  *** FINAL REPORT ***    Name: Kaushik Conner  MRN: [id number]    Outpatient  : 1970  HIS Order #: [id number]  [address] [facility] Visit #: [id number]  Date: 2017    TYPE OF TEST: Extremity Arterial Duplex    REASON FOR TEST  Claudication                            Right                     Left  Artery               PSV   Finding             PSV   Finding  ------------------  -----  ---------------    -----  ---------------  External iliac:  Common femoral:     102.0  Profunda femoris:    46.0  Proximal SFA:        88.0  Mid SFA:  Distal SFA:  Popliteal:  Anterior tibial:     13.0  Posterior tibial:    31.0    Pressures               Right     Left               -----     -----     Brachial:           DP:           PT:            ANDERSON:            Toe: INTERPRETATION/FINDINGS  PROCEDURE:  Color duplex ultrasound imaging of lower extremity  arteries. The exam may include pulse volume recording (PVR)  plethysmography at the ankle and/or measurement of systolic blood  pressure at the ankle and brachial level with calculation of the  ankle/brachial pressure index (ANDERSON), if indicated. FINDINGS:  The right common femoral, deep femoral, superficial  femoral, popliteal, posterior tibial, and anterior tibial arteries are   visualized. Color Doppler imaging demonstrates significant  narrowing of the  poplital artery flow channel. Velocity is  significantly elevated. ANDERSON is 0.72 on the right and 1.13 on the  left. PVR waveforms are moderately abnormal at the right ankle and  normal at the left ankle. IMPRESSION:  There is evidence of hemodynamically significant right  lower extremity arterial obstruction. ADDITIONAL COMMENTS    I have personally reviewed the data relevant to the interpretation of  this  study. TECHNOLOGIST: Minerva Valdez.  Cecilio Mayo  Signed: 2017 04:09 PM    PHYSICIAN: Kelvin Ingram MD  Signed: 2017 07:19 AM

## 2017-11-03 NOTE — PROCEDURES
[facility]  *** FINAL REPORT ***    Name: Ridge Mario  MRN: [id number]    Outpatient  : 1970  HIS Order #: [id number]  [address] [facility] Visit #: [id number]  Date: 2017    TYPE OF TEST: Cerebrovascular Duplex    REASON FOR TEST  Dizziness    Right Carotid:-             Proximal               Mid                 Distal  cm/s  Systolic  Diastolic  Systolic  Diastolic  Systolic  Diastolic  CCA:     89.2      14.0                            61.0      20.0  Bulb:  ECA:     95.0      18.0  ICA:     71.0      19.0                            67.0      26.0  ICA/CCA:  1.2       1.0    ICA Stenosis:    Right Vertebral:-  Finding: Antegrade  Sys:       52.0  Rosalia:       19.0    Right Subclavian:    Left Carotid:-            Proximal                Mid                 Distal  cm/s  Systolic  Diastolic  Systolic  Diastolic  Systolic  Diastolic  CCA:     05.6      25.0                            61.0      17.0  Bulb:  ECA:    109.0      17.0  ICA:     67.0      29.0                           102.0      41.0  ICA/CCA:  1.1       1.7    ICA Stenosis:    Left Vertebral:-  Finding: Antegrade  Sys:       61.0  Rosalia:       19.0    Left Subclavian:    INTERPRETATION/FINDINGS  PROCEDURE:  Color duplex ultrasound imaging of extracranial  cerebrovascular arteries. FINDINGS:       Right:  Internal carotid velocity is within normal limits. There   is narrowing of the internal carotid flow channel on color Doppler  imaging and  non-calcific plaque on B-mode imaging, consistent with  less than 50 percent stenosis (lower portion of the 0 to 49 percent  range). The common and external carotid arteries are patent and  without evidence of hemodynamically significant stenosis. Left:  Internal carotid velocity is within normal limits. There  is narrowing of the internal carotid flow channel on color Doppler  imaging and non-calcific plaque on B-mode imaging, consistent with  less than 50 percent stenosis.   The common and external carotid  arteries are patent and without evidence of hemodynamically  significant stenosis. IMPRESSION:  Consistent with  minimal stenosis of the right internal  carotid and less than 50 percent stenosis of the left internal  carotid. Vertebrals are patent with antegrade flow. I    ADDITIONAL COMMENTS    I have personally reviewed the data relevant to the interpretation of  this  study.     TECHNOLOGIST: Aakash Veronica RVT  Signed: 11/03/2017 03:13 PM    PHYSICIAN: Ric Reynolds MD  Signed: 11/06/2017 07:19 AM

## 2017-11-03 NOTE — PROCEDURES
[facility]  *** FINAL REPORT ***    Name: Niranjan Mcintosh  MRN: [id number]    Outpatient  : 1970  HIS Order #: [id number]  [id number] [facility] Visit #: [id number]  Date: 2017    TYPE OF TEST: Peripheral Arterial Testing    REASON FOR TEST  Claudication    Right Leg  Segmentals: Abnormal                     mmHg  Brachial         127  High thigh  Low thigh  Calf  Posterior tibial  92  Dorsalis pedis    85  Peroneal  Metatarsal  Toe pressure      79  Doppler:  PVR:  Ankle/Brachial: 0.72    Left Leg  Segmentals: Normal                     mmHg  Brachial  High thigh  Low thigh  Calf  Posterior tibial 133  Dorsalis pedis   143  Peroneal  Metatarsal  Toe pressure     108  Doppler:  PVR:  Ankle/Brachial: 1.13    INTERPRETATION/FINDINGS  PROCEDURE:  Evaluation of lower extremity arteries with systolic blood   pressure measurement at the ankle and brachial level and calculation  of the ankle/brachial pressure index (ANDERSON). Unless otherwise  indicated, the exam also includes pulse volume recording (PVR)  plethysmography at the ankle level. FINDINGS:  ANDERSON is o.72 on the right and 1.13 on the left. PVR  waveforms are moderately abnormal at the right ankle and normal at the   left ankle. IMPRESSION:  There is evidence of hemodynamically significant lower  extremity arterial obstruction. ADDITIONAL COMMENTS    I have personally reviewed the data relevant to the interpretation of  this  study. TECHNOLOGIST: Matt Jesus.  Tonette Ahumada  Signed: 2017 03:32 PM    PHYSICIAN: Lyndon Tello MD  Signed: 2017 07:19 AM

## 2017-11-10 NOTE — PROGRESS NOTES
IMPRESSION:  There is evidence of hemodynamically significant lower  extremity arterial obstruction.       Pt has peripheral vascular disease    Please refer to Dr. Parmjit Pérez ASAP

## 2017-11-10 NOTE — PROGRESS NOTES
IMPRESSION:  Consistent with  minimal stenosis of the right internal  carotid and less than 50 percent stenosis of the left internal  carotid. Vertebrals are patent with antegrade flow.     Recheck in 1 year

## 2017-11-10 NOTE — PROGRESS NOTES
[phone] verified  notified of her results per patient she is aware of results and already spoke to her cardiologist Dr Jody Hays

## 2017-12-12 DIAGNOSIS — F41.9 ANXIETY: ICD-10-CM

## 2017-12-13 RX ORDER — ALPRAZOLAM 0.5 MG/1
TABLET ORAL
Qty: 60 TAB | Refills: 0 | Status: SHIPPED | OUTPATIENT
Start: 2017-12-13 | End: 2018-01-15 | Stop reason: SDUPTHER

## 2018-01-15 DIAGNOSIS — F41.9 ANXIETY: ICD-10-CM

## 2018-01-17 RX ORDER — ALPRAZOLAM 0.5 MG/1
TABLET ORAL
Qty: 60 TAB | Refills: 0 | Status: SHIPPED | OUTPATIENT
Start: 2018-01-17 | End: 2018-02-19 | Stop reason: SDUPTHER

## 2018-01-17 RX ORDER — ESCITALOPRAM OXALATE 20 MG/1
TABLET ORAL
Qty: 90 TAB | Refills: 0 | Status: SHIPPED | OUTPATIENT
Start: 2018-01-17 | End: 2018-02-21 | Stop reason: SDUPTHER

## 2018-02-21 ENCOUNTER — OFFICE VISIT (OUTPATIENT)
Dept: FAMILY MEDICINE CLINIC | Age: 48
End: 2018-02-21

## 2018-02-21 VITALS
SYSTOLIC BLOOD PRESSURE: 110 MMHG | HEART RATE: 90 BPM | WEIGHT: 155.8 LBS | OXYGEN SATURATION: 97 % | TEMPERATURE: 98.8 F | HEIGHT: 64 IN | BODY MASS INDEX: 26.6 KG/M2 | DIASTOLIC BLOOD PRESSURE: 68 MMHG | RESPIRATION RATE: 18 BRPM

## 2018-02-21 DIAGNOSIS — I10 HTN (HYPERTENSION), BENIGN: Primary | ICD-10-CM

## 2018-02-21 DIAGNOSIS — Z72.0 TOBACCO ABUSE: ICD-10-CM

## 2018-02-21 DIAGNOSIS — E78.00 HYPERCHOLESTEROLEMIA: ICD-10-CM

## 2018-02-21 DIAGNOSIS — F41.9 ANXIETY: ICD-10-CM

## 2018-02-21 RX ORDER — ESCITALOPRAM OXALATE 20 MG/1
TABLET ORAL
Qty: 90 TAB | Refills: 1 | Status: SHIPPED | OUTPATIENT
Start: 2018-02-21 | End: 2018-09-11 | Stop reason: SDUPTHER

## 2018-02-21 RX ORDER — CLOPIDOGREL BISULFATE 75 MG/1
TABLET ORAL
Refills: 6 | COMMUNITY
Start: 2018-02-02 | End: 2019-01-23
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
## 2018-02-21 NOTE — PATIENT INSTRUCTIONS
Anxiety Disorder: Care Instructions  Your Care Instructions    Anxiety is a normal reaction to stress. Difficult situations can cause you to have symptoms such as sweaty palms and a nervous feeling. In an anxiety disorder, the symptoms are far more severe. Constant worry, muscle tension, trouble sleeping, nausea and diarrhea, and other symptoms can make normal daily activities difficult or impossible. These symptoms may occur for no reason, and they can affect your work, school, or social life. Medicines, counseling, and self-care can all help. Follow-up care is a key part of your treatment and safety. Be sure to make and go to all appointments, and call your doctor if you are having problems. It's also a good idea to know your test results and keep a list of the medicines you take. How can you care for yourself at home? · Take medicines exactly as directed. Call your doctor if you think you are having a problem with your medicine. · Go to your counseling sessions and follow-up appointments. · Recognize and accept your anxiety. Then, when you are in a situation that makes you anxious, say to yourself, \"This is not an emergency. I feel uncomfortable, but I am not in danger. I can keep going even if I feel anxious. \"  · Be kind to your body:  ¨ Relieve tension with exercise or a massage. ¨ Get enough rest.  ¨ Avoid alcohol, caffeine, nicotine, and illegal drugs. They can increase your anxiety level and cause sleep problems. ¨ Learn and do relaxation techniques. See below for more about these techniques. · Engage your mind. Get out and do something you enjoy. Go to a funny movie, or take a walk or hike. Plan your day. Having too much or too little to do can make you anxious. · Keep a record of your symptoms. Discuss your fears with a good friend or family member, or join a support group for people with similar problems. Talking to others sometimes relieves stress.   · Get involved in social groups, or volunteer to help others. Being alone sometimes makes things seem worse than they are. · Get at least 30 minutes of exercise on most days of the week to relieve stress. Walking is a good choice. You also may want to do other activities, such as running, swimming, cycling, or playing tennis or team sports. Relaxation techniques  Do relaxation exercises 10 to 20 minutes a day. You can play soothing, relaxing music while you do them, if you wish. · Tell others in your house that you are going to do your relaxation exercises. Ask them not to disturb you. · Find a comfortable place, away from all distractions and noise. · Lie down on your back, or sit with your back straight. · Focus on your breathing. Make it slow and steady. · Breathe in through your nose. Breathe out through either your nose or mouth. · Breathe deeply, filling up the area between your navel and your rib cage. Breathe so that your belly goes up and down. · Do not hold your breath. · Breathe like this for 5 to 10 minutes. Notice the feeling of calmness throughout your whole body. As you continue to breathe slowly and deeply, relax by doing the following for another 5 to 10 minutes:  · Tighten and relax each muscle group in your body. You can begin at your toes and work your way up to your head. · Imagine your muscle groups relaxing and becoming heavy. · Empty your mind of all thoughts. · Let yourself relax more and more deeply. · Become aware of the state of calmness that surrounds you. · When your relaxation time is over, you can bring yourself back to alertness by moving your fingers and toes and then your hands and feet and then stretching and moving your entire body. Sometimes people fall asleep during relaxation, but they usually wake up shortly afterward. · Always give yourself time to return to full alertness before you drive a car or do anything that might cause an accident if you are not fully alert.  Never play a relaxation tape while you drive a car. When should you call for help? Call 911 anytime you think you may need emergency care. For example, call if:  ? · You feel you cannot stop from hurting yourself or someone else. ? Keep the numbers for these national suicide hotlines: 4-952-379-TALK (7-360.157.7855) and 5-594-PZVCFOL (2-752.187.5642). If you or someone you know talks about suicide or feeling hopeless, get help right away. ? Watch closely for changes in your health, and be sure to contact your doctor if:  ? · You have anxiety or fear that affects your life. ? · You have symptoms of anxiety that are new or different from those you had before. Where can you learn more? Go to http://yuliya-bryan.info/. Enter P754 in the search box to learn more about \"Anxiety Disorder: Care Instructions. \"  Current as of: May 12, 2017  Content Version: 11.4  © 0843-2149 [employer]. Care instructions adapted under license by [employer] (which disclaims liability or warranty for this information). If you have questions about a medical condition or this instruction, always ask your healthcare professional. [address] any warranty or liability for your use of this information.

## 2018-02-21 NOTE — MR AVS SNAPSHOT
[address] 
[phone] Patient: Demetria Diaz MRN: [id number] RHODA:[birth year] Visit Information Date & Time Provider Department Dept. Phone Encounter #  
 2/21/2018  8:00 AM Edna Staton  [place] [phone] [id number] Follow-up Instructions Return in about 3 months (around 5/21/2018) for anxiety,, hyperlipidemia follow up. Upcoming Health Maintenance Date Due Pneumococcal 19-64 Highest Risk (1 of 3 - PCV13) 4/5/2021* PAP AKA CERVICAL CYTOLOGY 2/8/2019 DTaP/Tdap/Td series (2 - Td) 8/10/2026 *Topic was postponed. The date shown is not the original due date. Allergies as of 2/21/2018  Review Complete On: 2/21/2018 By: Edna Staton MD  
  
 Severity Noted Reaction Type Reactions Amoxil [Amoxicillin]  02/06/2013    Diarrhea Codeine  01/11/2011    Nausea and Vomiting Current Immunizations  Reviewed on 4/17/2013 Name Date Td 2/10/2014 Not reviewed this visit You Were Diagnosed With   
  
 Codes Comments HTN (hypertension), benign    -  Primary ICD-10-CM: I10 
ICD-9-CM: 401.1 Hypercholesterolemia     ICD-10-CM: E78.00 ICD-9-CM: 272.0 Tobacco abuse     ICD-10-CM: Z72.0 ICD-9-CM: 305.1 Anxiety     ICD-10-CM: F41.9 ICD-9-CM: 300.00 Vitals BP Pulse Temp Resp Height(growth percentile) Weight(growth percentile) 110/68 (BP 1 Location: Left arm, BP Patient Position: Sitting) 90 98.8 °F (37.1 °C) (Oral) 18 5' 4\" (1.626 m) 155 lb 12.8 oz (70.7 kg) SpO2 BMI OB Status Smoking Status 97% 26.74 kg/m2 Hysterectomy Current Every Day Smoker Vitals History BMI and BSA Data Body Mass Index Body Surface Area  
 26.74 kg/m 2 1.79 m 2 Preferred Pharmacy Pharmacy Name Phone [address], [address] [facility] [address] [phone] Your Updated Medication List  
  
   
This list is accurate as of 2/21/18  8:49 AM.  Always use your most recent med list.  
  
  
  
  
 ALPRAZolam 0.5 mg tablet Commonly known as:  XANAX  
TAKE 1 TABLET BY MOUTH TWICE DAILY AS NEEDED FOR ANXIETY  
  
 aspirin 325 mg tablet Commonly known as:  ASPIRIN Take 325 mg by mouth daily. clopidogrel 75 mg Tab Commonly known as:  PLAVIX TK 1 T PO QD  
  
 diclofenac EC 75 mg EC tablet Commonly known as:  VOLTAREN Take 1 Tab by mouth daily. escitalopram oxalate 20 mg tablet Commonly known as:  Karla Pattee TAKE 1 TABLET BY MOUTH EVERY DAY AS NEEDED FOR ANXIETY  
  
 famotidine 40 mg tablet Commonly known as:  PEPCID  
TAKE 1 T PO QD  
  
 lisinopril 10 mg tablet Commonly known as:  PRINIVIL, ZESTRIL  
TAKE 1 TABLET BY MOUTH DAILY  
  
 metoclopramide HCl 5 mg tablet Commonly known as:  REGLAN  
TAKE 1 TABLET BY MOUTH THREE TIMES DAILY BEFORE MEALS  
  
 omeprazole 20 mg capsule Commonly known as:  PriLOSEC Take 1 Cap by mouth daily. prochlorperazine 10 mg tablet Commonly known as:  COMPAZINE Take 1 Tab by mouth every six (6) hours as needed for Nausea. simvastatin 40 mg tablet Commonly known as:  ZOCOR  
TAKE 1 TABLET BY MOUTH EVERY NIGHT Prescriptions Sent to Pharmacy Refills  
 escitalopram oxalate (LEXAPRO) 20 mg tablet 1 Sig: TAKE 1 TABLET BY MOUTH EVERY DAY AS NEEDED FOR ANXIETY Class: Normal  
 Pharmacy: [address] [place] [facility] #: [phone] Follow-up Instructions Return in about 3 months (around 5/21/2018) for anxiety,, hyperlipidemia follow up. Patient Instructions Anxiety Disorder: Care Instructions Your Care Instructions Anxiety is a normal reaction to stress. Difficult situations can cause you to have symptoms such as sweaty palms and a nervous feeling. In an anxiety disorder, the symptoms are far more severe. Constant worry, muscle tension, trouble sleeping, nausea and diarrhea, and other symptoms can make normal daily activities difficult or impossible. These symptoms may occur for no reason, and they can affect your work, school, or social life. Medicines, counseling, and self-care can all help. Follow-up care is a key part of your treatment and safety. Be sure to make and go to all appointments, and call your doctor if you are having problems. It's also a good idea to know your test results and keep a list of the medicines you take. How can you care for yourself at home? · Take medicines exactly as directed. Call your doctor if you think you are having a problem with your medicine. · Go to your counseling sessions and follow-up appointments. · Recognize and accept your anxiety. Then, when you are in a situation that makes you anxious, say to yourself, \"This is not an emergency. I feel uncomfortable, but I am not in danger. I can keep going even if I feel anxious. \" · Be kind to your body: ¨ Relieve tension with exercise or a massage. ¨ Get enough rest. 
¨ Avoid alcohol, caffeine, nicotine, and illegal drugs. They can increase your anxiety level and cause sleep problems. ¨ Learn and do relaxation techniques. See below for more about these techniques. · Engage your mind. Get out and do something you enjoy. Go to a funny movie, or take a walk or hike. Plan your day. Having too much or too little to do can make you anxious. · Keep a record of your symptoms. Discuss your fears with a good friend or family member, or join a support group for people with similar problems. Talking to others sometimes relieves stress. · Get involved in social groups, or volunteer to help others. Being alone sometimes makes things seem worse than they are.  
· Get at least 30 minutes of exercise on most days of the week to relieve stress. Walking is a good choice. You also may want to do other activities, such as running, swimming, cycling, or playing tennis or team sports. Relaxation techniques Do relaxation exercises 10 to 20 minutes a day. You can play soothing, relaxing music while you do them, if you wish. · Tell others in your house that you are going to do your relaxation exercises. Ask them not to disturb you. · Find a comfortable place, away from all distractions and noise. · Lie down on your back, or sit with your back straight. · Focus on your breathing. Make it slow and steady. · Breathe in through your nose. Breathe out through either your nose or mouth. · Breathe deeply, filling up the area between your navel and your rib cage. Breathe so that your belly goes up and down. · Do not hold your breath. · Breathe like this for 5 to 10 minutes. Notice the feeling of calmness throughout your whole body. As you continue to breathe slowly and deeply, relax by doing the following for another 5 to 10 minutes: · Tighten and relax each muscle group in your body. You can begin at your toes and work your way up to your head. · Imagine your muscle groups relaxing and becoming heavy. · Empty your mind of all thoughts. · Let yourself relax more and more deeply. · Become aware of the state of calmness that surrounds you. · When your relaxation time is over, you can bring yourself back to alertness by moving your fingers and toes and then your hands and feet and then stretching and moving your entire body. Sometimes people fall asleep during relaxation, but they usually wake up shortly afterward. · Always give yourself time to return to full alertness before you drive a car or do anything that might cause an accident if you are not fully alert. Never play a relaxation tape while you drive a car. When should you call for help? Call 911 anytime you think you may need emergency care. For example, call if: ? · You feel you cannot stop from hurting yourself or someone else. ? Keep the numbers for these national suicide hotlines: 1-484-289-TALK (4-536.585.5998) and 0-725-QWEDCIJ (7-321.198.9997). If you or someone you know talks about suicide or feeling hopeless, get help right away. ? Watch closely for changes in your health, and be sure to contact your doctor if: 
? · You have anxiety or fear that affects your life. ? · You have symptoms of anxiety that are new or different from those you had before. Where can you learn more? Go to http://yuliya-bryan.info/. Enter P754 in the search box to learn more about \"Anxiety Disorder: Care Instructions. \" Current as of: May 12, 2017 Content Version: 11.4 © 2294-8140 [employer]. Care instructions adapted under license by [employer] (which disclaims liability or warranty for this information). If you have questions about a medical condition or this instruction, always ask your healthcare professional. [address] any warranty or liability for your use of this information. Introducing [facility]! [employer] introduces Filement patient portal. Now you can access parts of your medical record, email your doctor's office, and request medication refills online. 1. In your internet browser, go to [email or web address] 2. Click on the First Time User? Click Here link in the Sign In box. You will see the New Member Sign Up page. 3. Enter your Filement Access Code exactly as it appears below. You will not need to use this code after youve completed the sign-up process. If you do not sign up before the expiration date, you must request a new code. · Filement Access Code: [email or web address] Expires: 5/22/2018  8:04 AM 
 
4. Enter the last four digits of your Social Security Number (xxxx) and Date of Birth (mm/dd/yyyy) as indicated and click Submit.  You will be taken to the next sign-up page. 5. Create a Brandma.co ID. This will be your Brandma.co login ID and cannot be changed, so think of one that is secure and easy to remember. 6. Create a Brandma.co password. You can change your password at any time. 7. Enter your Password Reset Question and Answer. This can be used at a later time if you forget your password. 8. Enter your e-mail address. You will receive e-mail notification when new information is available in [address]. 9. Click Sign Up. You can now view and download portions of your medical record. 10. Click the Download Summary menu link to download a portable copy of your medical information. If you have questions, please visit the Frequently Asked Questions section of the [email or web address] website. Remember, Brandma.co is NOT to be used for urgent needs. For medical emergencies, dial 911. Now available from your iPhone and Android! Please provide this summary of care documentation to your next provider. Your primary care clinician is listed as Laura Ocasio. If you have any questions after today's visit, please call [phone].

## 2018-02-21 NOTE — PROGRESS NOTES
Chief Complaint   Patient presents with    Hypertension    Cholesterol Problem       Reviewed Record in preparation for visit and have obtained necessary documentation. Identified pt with two pt identifiers (Name @ )    There are no preventive care reminders to display for this patient. 1. Have you been to the ER, urgent care clinic since your last visit? Hospitalized since your last visit? No    2. Have you seen or consulted any other health care providers outside of the [address] [place] [address] since your last visit? Include any pap smears or colon screening. Saw oncologist recently.

## 2018-03-27 ENCOUNTER — TELEPHONE (OUTPATIENT)
Dept: FAMILY MEDICINE CLINIC | Age: 48
End: 2018-03-27

## 2018-03-27 NOTE — TELEPHONE ENCOUNTER
Patient is requesting an appointment within next 2 weeks. She is concerned about her symptoms. Also patient is calling to inform Dr Marcela Padilla that [facility] Rt Swollen ear drum.  She was given 800mg Ibufropen and Cefdinir (Antibiotic)    Patients symptoms as of today:  Pain in her rt ear   Cramping in her lower stomach   Lost 10 pounds in 2-3 weeks    Patient was last seen :February 21, 2018  Patients upcoming appointment :May 23, 2018    Patients best call back :[phone]

## 2018-04-02 ENCOUNTER — DOCUMENTATION ONLY (OUTPATIENT)
Dept: FAMILY MEDICINE CLINIC | Age: 48
End: 2018-04-02

## 2018-04-02 ENCOUNTER — OFFICE VISIT (OUTPATIENT)
Dept: FAMILY MEDICINE CLINIC | Age: 48
End: 2018-04-02

## 2018-04-02 VITALS
RESPIRATION RATE: 18 BRPM | HEART RATE: 89 BPM | TEMPERATURE: 98.3 F | WEIGHT: 156.6 LBS | SYSTOLIC BLOOD PRESSURE: 134 MMHG | DIASTOLIC BLOOD PRESSURE: 79 MMHG | BODY MASS INDEX: 26.73 KG/M2 | OXYGEN SATURATION: 98 % | HEIGHT: 64 IN

## 2018-04-02 DIAGNOSIS — R63.4 WEIGHT LOSS: ICD-10-CM

## 2018-04-02 DIAGNOSIS — C34.11 MALIGNANT NEOPLASM OF UPPER LOBE OF RIGHT LUNG (HCC): ICD-10-CM

## 2018-04-02 DIAGNOSIS — H65.191 OTHER ACUTE NONSUPPURATIVE OTITIS MEDIA OF RIGHT EAR, RECURRENCE NOT SPECIFIED: ICD-10-CM

## 2018-04-02 DIAGNOSIS — E27.8 ADRENAL MASS (HCC): Primary | ICD-10-CM

## 2018-04-02 RX ORDER — CIPROFLOXACIN AND DEXAMETHASONE 3; 1 MG/ML; MG/ML
4 SUSPENSION/ DROPS AURICULAR (OTIC) 2 TIMES DAILY
Qty: 7.5 ML | Status: SHIPPED | OUTPATIENT
Start: 2018-04-02 | End: 2018-04-09

## 2018-04-02 NOTE — PATIENT INSTRUCTIONS
Lung Cancer: Care Instructions  Your Care Instructions    Lung cancer occurs when abnormal cells grow out of control in the lung. Lung cancer can start anywhere in the lungs and spread to other parts of the body. Treatment for lung cancer depends on what type of lung cancer you have and how advanced it is. Treatment may include surgery to remove the cancer. It could also include medicines (chemotherapy) or radiation to destroy cancer cells. Being treated for cancer can weaken your body, and you may feel very tired. Home treatment and certain medicines can help you feel better. Finding out that you have cancer is scary. You may feel many emotions and may need some help coping. Seek out family, friends, and counselors for support. You also can do things at home to make yourself feel better while you go through treatment. Call the Bit[employer] (0-401.881.7644) or visit its website at [address] [employer] for more information. Follow-up care is a key part of your treatment and safety. Be sure to make and go to all appointments, and call your doctor if you are having problems. It's also a good idea to know your test results and keep a list of the medicines you take. How can you care for yourself at home? · Take your medicines exactly as prescribed. Call your doctor if you think you are having a problem with your medicine. You will get more details on the specific medicines your doctor prescribes. · Follow your doctor's instructions to relieve pain. Use pain medicine when you first feel pain, before it becomes severe. Taking pain medicines regularly is often the best way to keep pain under control. · Eat healthy food. If you do not feel like eating, try to eat food that has protein and extra calories to keep up your strength and prevent weight loss. Drink liquid meal replacements for extra calories and protein. Try to eat your main meal early. Eating smaller portions more often may help as well.   · Get some physical activity every day, but do not get too tired. Keep doing the hobbies you enjoy as your energy allows. · Do not smoke. Smoking can make your cancer symptoms worse. If you need help quitting, talk to your doctor about stop-smoking programs and medicines. These can increase your chances of quitting for good. · If you use oxygen, do not smoke, light a cigarette, or use a flame while your oxygen is on. Smoking while using oxygen can lead to fire and even explosion. · If you have nausea, try to eat several small meals a day. When you feel better, eat clear soups and mild foods until all symptoms are gone for 12 to 48 hours. Other good choices include dry toast, crackers, cooked cereal, and gelatin dessert, such as Jell-O.  · If you are vomiting or have diarrhea:  ¨ Drink plenty of fluids (enough so that your urine is light yellow or clear like water) to prevent dehydration. Choose water and other caffeine-free clear liquids. If you have kidney, heart, or liver disease and have to limit fluids, talk with your doctor before you increase the amount of fluids you drink. ¨ When you are able to eat, try clear soups, mild foods, and liquids until all symptoms are gone for 12 to 48 hours. Other good choices include dry toast, crackers, cooked cereal, and gelatin dessert, such as Jell-O.  · Take steps to control your stress and workload. Learn relaxation techniques. ¨ Share your feelings. Stress and tension affect our emotions. By expressing your feelings to others, you may be able to understand and cope with them. ¨ Consider joining a support group. Talking about a problem with your spouse, a good friend, or other people with similar problems is a good way to reduce tension and stress. ¨ Express yourself with art. Try writing, crafts, dance, or art to relieve stress. Some dance, writing, or art groups may be available just for people who have cancer. ¨ Be kind to your body and mind.  Getting enough sleep, eating a healthy diet, and taking time to do things you enjoy can contribute to an overall feeling of balance in your life and help reduce stress. ¨ Get help if you need it. Discuss your concerns with your doctor or counselor. · If you have not already done so, prepare a list of advance directives. Advance directives are instructions to your doctor and family members about what kind of care you want if you become unable to speak or express yourself. When should you call for help? Call 911 anytime you think you may need emergency care. For example, call if:  ? · You passed out (lost consciousness). ? · You have severe trouble breathing. ? · You cough up a lot of blood. ?Call your doctor now or seek immediate medical care if:  ? · You have a fever. ? · You are short of breath. ? · You have new or worse pain. ? · You have a new or worse cough. ? · You think you have an infection. ? Watch closely for changes in your health, and be sure to contact your doctor if:  ? · You do not get better as expected. Where can you learn more? Go to http://yuliya-bryan.info/. Enter V337 in the search box to learn more about \"Lung Cancer: Care Instructions. \"  Current as of: May 12, 2017  Content Version: 11.4  © 5277-5513 Healthwise, [employer]. Care instructions adapted under license by [employer] (which disclaims liability or warranty for this information). If you have questions about a medical condition or this instruction, always ask your healthcare professional. [place] [address] any warranty or liability for your use of this information.

## 2018-04-02 NOTE — ASSESSMENT & PLAN NOTE
This condition is managed by Specialist.  Key Oncology Meds             prochlorperazine (COMPAZINE) 10 mg tablet  (Taking) Take 1 Tab by mouth every six (6) hours as needed for Nausea. Key Pain Meds             diclofenac EC (VOLTAREN) 75 mg EC tablet  (Taking) Take 1 Tab by mouth daily. aspirin (ASPIRIN) 325 mg tablet  (Taking) Take 325 mg by mouth daily. Lab Results   Component Value Date/Time    WBC 6.7 08/21/2017 11:47 AM    ABS.  NEUTROPHILS 4.8 08/21/2017 11:47 AM    HGB 14.1 08/21/2017 11:47 AM    HCT 43.8 08/21/2017 11:47 AM    PLATELET 247 07/25/6398 11:47 AM    Creatinine 0.81 08/21/2017 11:47 AM    BUN 8 08/21/2017 11:47 AM    ALT (SGPT) 15 08/21/2017 11:47 AM    AST (SGOT) 21 08/21/2017 11:47 AM    Albumin 4.1 08/21/2017 11:47 AM

## 2018-04-02 NOTE — ASSESSMENT & PLAN NOTE
This condition is managed by Specialist.  Lab Results   Component Value Date/Time    WBC 6.7 08/21/2017 11:47 AM    HGB 14.1 08/21/2017 11:47 AM    HCT 43.8 08/21/2017 11:47 AM    PLATELET 722 38/44/3699 11:47 AM    Creatinine 0.81 08/21/2017 11:47 AM    BUN 8 08/21/2017 11:47 AM    Potassium 5.0 08/21/2017 11:47 AM

## 2018-04-02 NOTE — MR AVS SNAPSHOT
[address] [facility], [place] 
 
 
 [address] [place] [address] 
[phone] Patient: Remedios Orta MRN: [id number] EEA:[birth year] Visit Information Date & Time Provider Department Dept. Phone Encounter #  
 4/2/2018  8:30 AM Jere Michele  [facility] [phone] [id number] Follow-up Instructions Return in about 4 weeks (around 4/30/2018) for AOM. Your Appointments 5/23/2018  8:15 AM  
ROUTINE CARE with Jere Michele MD  
[facility]) Appt Note: 3 months f/u appt  
 [address] [phone]  
[phone]  
  
   
 [address] [address] Upcoming Health Maintenance Date Due Pneumococcal 19-64 Medium Risk (1 of 1 - PPSV23) 7/7/1987 MEDICARE YEARLY EXAM 3/14/2018 PAP AKA CERVICAL CYTOLOGY 2/8/2019 DTaP/Tdap/Td series (2 - Td) 8/10/2026 Allergies as of 4/2/2018  Review Complete On: 4/2/2018 By: Jere Michele MD  
  
 Severity Noted Reaction Type Reactions Amoxil [Amoxicillin]  02/06/2013    Diarrhea Codeine  01/11/2011    Nausea and Vomiting Current Immunizations  Reviewed on 4/17/2013 Name Date Td 2/10/2014 Not reviewed this visit You Were Diagnosed With   
  
 Codes Comments Adrenal mass ([facility] [address]    -  Primary ICD-10-CM: E27.9 ICD-9-CM: 255.9 Malignant neoplasm of upper lobe of right lung [facility])     ICD-10-CM: C34.11 ICD-9-CM: 162.3 Weight loss     ICD-10-CM: R63.4 ICD-9-CM: 783.21 Other acute nonsuppurative otitis media of right ear, recurrence not specified     ICD-10-CM: H65.191 ICD-9-CM: 381.00 Vitals BP Pulse Temp Resp Height(growth percentile) Weight(growth percentile) 134/79 (BP 1 Location: Left arm, BP Patient Position: Sitting) 89 98.3 °F (36.8 °C) (Oral) 18 5' 4\" (1.626 m) 156 lb 9.6 oz (71 kg) SpO2 BMI OB Status Smoking Status 98% 26.88 kg/m2 Hysterectomy Current Every Day Smoker BMI and BSA Data Body Mass Index Body Surface Area  
 26.88 kg/m 2 1.79 m 2 Preferred Pharmacy Pharmacy Name Phone [address], [phone] [facility] [address] [phone] Your Updated Medication List  
  
   
This list is accurate as of 4/2/18  9:25 AM.  Always use your most recent med list.  
  
  
  
  
 ALPRAZolam 0.5 mg tablet Commonly known as:  XANAX  
TAKE 1 TABLET BY MOUTH TWICE DAILY AS NEEDED FOR ANXIETY  
  
 aspirin 325 mg tablet Commonly known as:  ASPIRIN Take 325 mg by mouth daily. ciprofloxacin-dexamethasone 0.3-0.1 % otic suspension Commonly known as:  Oneta Hashimoto Administer 4 Drops in right ear two (2) times a day for 7 days. clopidogrel 75 mg Tab Commonly known as:  PLAVIX TK 1 T PO QD  
  
 diclofenac EC 75 mg EC tablet Commonly known as:  VOLTAREN Take 1 Tab by mouth daily. escitalopram oxalate 20 mg tablet Commonly known as:  Vallorie Primmer TAKE 1 TABLET BY MOUTH EVERY DAY AS NEEDED FOR ANXIETY  
  
 famotidine 40 mg tablet Commonly known as:  PEPCID  
TAKE 1 T PO QD  
  
 lisinopril 10 mg tablet Commonly known as:  PRINIVIL, ZESTRIL  
TAKE 1 TABLET BY MOUTH DAILY  
  
 metoclopramide HCl 5 mg tablet Commonly known as:  REGLAN  
TAKE 1 TABLET BY MOUTH THREE TIMES DAILY BEFORE MEALS  
  
 omeprazole 20 mg capsule Commonly known as:  PriLOSEC Take 1 Cap by mouth daily. prochlorperazine 10 mg tablet Commonly known as:  COMPAZINE Take 1 Tab by mouth every six (6) hours as needed for Nausea. simvastatin 40 mg tablet Commonly known as:  ZOCOR  
TAKE 1 TABLET BY MOUTH EVERY NIGHT Prescriptions Printed Refills  
 ciprofloxacin-dexamethasone (CIPRODEX) 0.3-0.1 % otic suspension o Sig: Administer 4 Drops in right ear two (2) times a day for 7 days. Class: Print  Route: Right Ear  
 Follow-up Instructions Return in about 4 weeks (around 4/30/2018) for AOM. To-Do List   
 04/02/2018 Imaging:  CT ABD PELV W CONT   
  
 04/02/2018 Imaging:  CT CHEST W CONT Referral Information Referral ID Referred By Referred To  
  
 [id number] [facility] Not Available Visits Status Start Date End Date 1 New Request 4/2/18 4/2/19 If your referral has a status of pending review or denied, additional information will be sent to support the outcome of this decision. Referral ID Referred By Referred To  
 [id number] [facility] Not Available Visits Status Start Date End Date 1 New Request 4/2/18 4/2/19 If your referral has a status of pending review or denied, additional information will be sent to support the outcome of this decision. Patient Instructions Lung Cancer: Care Instructions Your Care Instructions Lung cancer occurs when abnormal cells grow out of control in the lung. Lung cancer can start anywhere in the lungs and spread to other parts of the body. Treatment for lung cancer depends on what type of lung cancer you have and how advanced it is. Treatment may include surgery to remove the cancer. It could also include medicines (chemotherapy) or radiation to destroy cancer cells. Being treated for cancer can weaken your body, and you may feel very tired. Home treatment and certain medicines can help you feel better. Finding out that you have cancer is scary. You may feel many emotions and may need some help coping. Seek out family, friends, and counselors for support. You also can do things at home to make yourself feel better while you go through treatment. Call the [facility] (9-383.233.1255) or visit its website at [employer]OZI for more information. Follow-up care is a key part of your treatment and safety.  Be sure to make and go to all appointments, and call your doctor if you are having problems. It's also a good idea to know your test results and keep a list of the medicines you take. How can you care for yourself at home? · Take your medicines exactly as prescribed. Call your doctor if you think you are having a problem with your medicine. You will get more details on the specific medicines your doctor prescribes. · Follow your doctor's instructions to relieve pain. Use pain medicine when you first feel pain, before it becomes severe. Taking pain medicines regularly is often the best way to keep pain under control. · Eat healthy food. If you do not feel like eating, try to eat food that has protein and extra calories to keep up your strength and prevent weight loss. Drink liquid meal replacements for extra calories and protein. Try to eat your main meal early. Eating smaller portions more often may help as well. · Get some physical activity every day, but do not get too tired. Keep doing the hobbies you enjoy as your energy allows. · Do not smoke. Smoking can make your cancer symptoms worse. If you need help quitting, talk to your doctor about stop-smoking programs and medicines. These can increase your chances of quitting for good. · If you use oxygen, do not smoke, light a cigarette, or use a flame while your oxygen is on. Smoking while using oxygen can lead to fire and even explosion. · If you have nausea, try to eat several small meals a day. When you feel better, eat clear soups and mild foods until all symptoms are gone for 12 to 48 hours. Other good choices include dry toast, crackers, cooked cereal, and gelatin dessert, such as Jell-O. · If you are vomiting or have diarrhea: ¨ Drink plenty of fluids (enough so that your urine is light yellow or clear like water) to prevent dehydration. Choose water and other caffeine-free clear liquids. If you have kidney, heart, or liver disease and have to limit fluids, talk with your doctor before you increase the amount of fluids you drink. ¨ When you are able to eat, try clear soups, mild foods, and liquids until all symptoms are gone for 12 to 48 hours. Other good choices include dry toast, crackers, cooked cereal, and gelatin dessert, such as Jell-O. 
· Take steps to control your stress and workload. Learn relaxation techniques. ¨ Share your feelings. Stress and tension affect our emotions. By expressing your feelings to others, you may be able to understand and cope with them. ¨ Consider joining a support group. Talking about a problem with your spouse, a good friend, or other people with similar problems is a good way to reduce tension and stress. ¨ Express yourself with art. Try writing, crafts, dance, or art to relieve stress. Some dance, writing, or art groups may be available just for people who have cancer. ¨ Be kind to your body and mind. Getting enough sleep, eating a healthy diet, and taking time to do things you enjoy can contribute to an overall feeling of balance in your life and help reduce stress. ¨ Get help if you need it. Discuss your concerns with your doctor or counselor. · If you have not already done so, prepare a list of advance directives. Advance directives are instructions to your doctor and family members about what kind of care you want if you become unable to speak or express yourself. When should you call for help? Call 911 anytime you think you may need emergency care. For example, call if: 
? · You passed out (lost consciousness). ? · You have severe trouble breathing. ? · You cough up a lot of blood. ?Call your doctor now or seek immediate medical care if: 
? · You have a fever. ? · You are short of breath. ? · You have new or worse pain. ? · You have a new or worse cough. ? · You think you have an infection. ? Watch closely for changes in your health, and be sure to contact your doctor if: 
? · You do not get better as expected. Where can you learn more? Go to http://yuliya-bryan.info/. Enter F735 in the search box to learn more about \"Lung Cancer: Care Instructions. \" Current as of: May 12, 2017 Content Version: 11.4 © 7933-9388 Healthwise, [employer]. Care instructions adapted under license by [employer] (which disclaims liability or warranty for this information). If you have questions about a medical condition or this instruction, always ask your healthcare professional. [address] any warranty or liability for your use of this information. Introducing [facility] & HEALTH SERVICES! Nazario Grant introduces [employer] patient portal. Now you can access parts of your medical record, email your doctor's office, and request medication refills online. 1. In your internet browser, go to https://[email or web address]. [employer] 2. Click on the First Time User? Click Here link in the Sign In box. You will see the New Member Sign Up page. 3. Enter your [employer] Access Code exactly as it appears below. You will not need to use this code after youve completed the sign-up process. If you do not sign up before the expiration date, you must request a new code. · [employer] Access Code: [email or web address] Expires: 2018  9:04 AM 
 
4. Enter the last four digits of your Social Security Number (xxxx) and Date of Birth (mm/dd/yyyy) as indicated and click Submit. You will be taken to the next sign-up page. 5. Create a Member[employer] ID. This will be your [employer] login ID and cannot be changed, so think of one that is secure and easy to remember. 6. Create a [employer] password. You can change your password at any time. 7. Enter your Password Reset Question and Answer. This can be used at a later time if you forget your password. 8. Enter your e-mail address. You will receive e-mail notification when new information is available in [address] 9. Click Sign Up. You can now view and download portions of your medical record. 10. Click the Download Summary menu link to download a portable copy of your medical information. If you have questions, please visit the Frequently Asked Questions section of the "[employer] website. Remember, "[employer] is NOT to be used for urgent needs. For medical emergencies, dial 911. Now available from your iPhone and Android! Please provide this summary of care documentation to your next provider. Your primary care clinician is listed as Anjel Better. If you have any questions after today's visit, please call [phone].
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
## 2018-04-02 NOTE — PROGRESS NOTES
Called [facility] Imaging department and lm that patient has CT abd pelv with contrast and CT chest with contrast ordered by Dr. Lucinda Catalan. Patient would like both these scheduled at her oncologists office Dr. Grayson Valenzuela 's office. (PHONE # [phone]). If any questions, may call us or patient back.

## 2018-04-02 NOTE — PROGRESS NOTES
Chief Complaint   Patient presents with    Follow-up     right ear infection       1. Have you been to the ER, urgent care clinic since your last visit? Hospitalized since your last visit? Yes When: [facility] 3/27/18 for right ear infection    2. Have you seen or consulted any other health care providers outside of the [address] [place] [address] since your last visit? Include any pap smears or colon screening.  No

## 2018-04-20 ENCOUNTER — TELEPHONE (OUTPATIENT)
Dept: FAMILY MEDICINE CLINIC | Age: 48
End: 2018-04-20

## 2018-04-20 NOTE — TELEPHONE ENCOUNTER
Patient called back wants results for her CT scan that was done at [facility] I advised patient needs to call them to get results and fax us the results per patient she will call them to fax results to us

## 2018-04-24 NOTE — TELEPHONE ENCOUNTER
Patient is calling, she is requesting a call back from [facility] in regards to her most recent CT scan. She [facility] was told by [facility] that they received the results as were waiting on Dr. Gatito Baxter to review. The patient thinks it has been enough time and she would like to know the results as soon as possible.      Best call back # for patient: [phone]

## 2018-04-25 ENCOUNTER — TELEPHONE (OUTPATIENT)
Dept: FAMILY MEDICINE CLINIC | Age: 48
End: 2018-04-25

## 2018-04-25 NOTE — TELEPHONE ENCOUNTER
----- Message from Earl Angel sent at 4/25/2018 12:06 PM EDT -----  Regarding: Dr. Galen Prasad  The patient is requesting a call back from [facility] to discuss getting a prescription for Rx Chantix.  (s)[phone]

## 2018-04-25 NOTE — TELEPHONE ENCOUNTER
[phone] verified  notified of her CT   Per Dr Carlee Rodriguez patient lymph node has increase in size of subcarinal Lymph nodes, no new masses, Needs to see her oncologist.   Patient understand  Report scan to her chart

## 2018-04-25 NOTE — TELEPHONE ENCOUNTER
[phone] spoke to patient wants to get a prescription for Chantix   LOV 4/2/2018   Has appointment 5/23/2018

## 2018-04-26 DIAGNOSIS — Z72.0 TOBACCO ABUSE: Primary | ICD-10-CM

## 2018-04-26 RX ORDER — VARENICLINE TARTRATE 25 MG
KIT ORAL
Qty: 1 DOSE PACK | Refills: 0 | Status: SHIPPED | OUTPATIENT
Start: 2018-04-26 | End: 2019-07-01

## 2018-04-27 ENCOUNTER — HOSPITAL ENCOUNTER (OUTPATIENT)
Dept: CT IMAGING | Age: 48
Discharge: HOME | End: 2018-04-27
Attending: SPECIALIST
Payer: MEDICARE

## 2018-04-27 DIAGNOSIS — C30.0 MALIGNANT NEOPLASM OF NASAL CAVITIES (HCC): ICD-10-CM

## 2018-04-27 DIAGNOSIS — R63.4 WEIGHT LOSS: ICD-10-CM

## 2018-04-27 DIAGNOSIS — C34.11 MALIGNANT NEOPLASM OF UPPER LOBE OF RIGHT LUNG (HCC): ICD-10-CM

## 2018-04-27 DIAGNOSIS — E27.8 ADRENAL MASS (HCC): ICD-10-CM

## 2018-04-27 PROCEDURE — 70486 CT MAXILLOFACIAL W/O DYE: CPT

## 2018-05-14 DIAGNOSIS — F41.9 ANXIETY: ICD-10-CM

## 2018-05-15 RX ORDER — ALPRAZOLAM 0.5 MG/1
TABLET ORAL
Qty: 60 TAB | Refills: 0 | Status: SHIPPED | OUTPATIENT
Start: 2018-05-15 | End: 2018-06-13 | Stop reason: SDUPTHER

## 2018-05-15 NOTE — TELEPHONE ENCOUNTER
Last office visit 04/02/18  Next apt 05/23/18  Last rx 02/21/18 for # 60 and 2 rf   pulled and last filled on 04/13/18

## 2018-05-15 NOTE — TELEPHONE ENCOUNTER
----- Message from Piedad Miramontes sent at 5/15/2018  8:12 AM EDT -----  Regarding: Dr. Ramy Marie  The patient is requesting that the doctor approves the refill request that was sent by the pharmacy.  ([facility] on file) (s)[phone]

## 2018-05-15 NOTE — TELEPHONE ENCOUNTER
Patient is requesting a refill, she is out of the medication    ALPRAZolam Yareli Garrett) 0.5 mg tablet  Last refill :2/21/2018  Last seen : April 02, 2018  Patient is requested it to be done today.   Patients Best call back : [phone]  Pharmacy on file verified

## 2018-06-13 DIAGNOSIS — F41.9 ANXIETY: ICD-10-CM

## 2018-06-15 NOTE — TELEPHONE ENCOUNTER
LOV 4/12/2018     The Prescription Monitoring Program has been reviewed for recent activity regarding controlled substances for this patient.      Per  last refill 5/16/2018 #60

## 2018-06-18 RX ORDER — ALPRAZOLAM 0.5 MG/1
TABLET ORAL
Qty: 60 TAB | Refills: 2 | Status: SHIPPED | OUTPATIENT
Start: 2018-06-18 | End: 2018-09-10 | Stop reason: SDUPTHER

## 2018-06-18 NOTE — TELEPHONE ENCOUNTER
[phone] SHAWNEE called in prescription for patients alprazalom 0.5 mg 1 tab twice daily #60 with 2 refills via my VM

## 2018-07-14 RX ORDER — LISINOPRIL 10 MG/1
TABLET ORAL
Qty: 30 TAB | Refills: 0 | Status: SHIPPED | OUTPATIENT
Start: 2018-07-14 | End: 2018-08-09 | Stop reason: SDUPTHER

## 2018-08-10 RX ORDER — LISINOPRIL 10 MG/1
TABLET ORAL
Qty: 30 TAB | Refills: 0 | Status: SHIPPED | OUTPATIENT
Start: 2018-08-10 | End: 2018-09-26 | Stop reason: SDUPTHER

## 2018-09-06 ENCOUNTER — TELEPHONE (OUTPATIENT)
Dept: FAMILY MEDICINE CLINIC | Age: 48
End: 2018-09-06

## 2018-09-06 NOTE — TELEPHONE ENCOUNTER
----- Message from Edenilson Zarate sent at 9/6/2018 10:29 AM EDT -----  Regarding: /Telephone  Pt is requesting a call back ASAP in reference to new medication perscription for \"Nitrostat\" heart medication. [address] [place] [address]. Best contact number is [phone].

## 2018-09-10 DIAGNOSIS — F41.9 ANXIETY: ICD-10-CM

## 2018-09-11 RX ORDER — ALPRAZOLAM 0.5 MG/1
TABLET ORAL
Qty: 60 TAB | Refills: 0 | Status: SHIPPED | OUTPATIENT
Start: 2018-09-11 | End: 2018-10-08 | Stop reason: SDUPTHER

## 2018-09-12 NOTE — TELEPHONE ENCOUNTER
[phone] [id number] called in prescription for patient xanax via     Requested Prescriptions     Signed Prescriptions Disp Refills    ALPRAZolam (XANAX) 0.5 mg tablet 60 Tab 0     Sig: TAKE 1 TABLET BY MOUTH TWICE DAILY AS NEEDED FOR ANXIETY     Authorizing Provider: Rachel Khan     [phone] (home) [phone] (work)   Patient notified prescription was called in to pharmacy advised patient next refill patient needs office visit

## 2018-09-12 NOTE — TELEPHONE ENCOUNTER
Patient aware that she needs to call her Cardiologist for her medication Nittrostat patient understand

## 2018-09-17 ENCOUNTER — TELEPHONE (OUTPATIENT)
Dept: FAMILY MEDICINE CLINIC | Age: 48
End: 2018-09-17

## 2018-09-17 ENCOUNTER — OFFICE VISIT (OUTPATIENT)
Dept: FAMILY MEDICINE CLINIC | Age: 48
End: 2018-09-17

## 2018-09-17 VITALS
DIASTOLIC BLOOD PRESSURE: 80 MMHG | HEIGHT: 64 IN | SYSTOLIC BLOOD PRESSURE: 117 MMHG | OXYGEN SATURATION: 96 % | BODY MASS INDEX: 26.46 KG/M2 | TEMPERATURE: 99 F | RESPIRATION RATE: 18 BRPM | HEART RATE: 92 BPM | WEIGHT: 155 LBS

## 2018-09-17 DIAGNOSIS — J06.9 UPPER RESPIRATORY TRACT INFECTION, UNSPECIFIED TYPE: Primary | ICD-10-CM

## 2018-09-17 DIAGNOSIS — R53.83 FATIGUE, UNSPECIFIED TYPE: ICD-10-CM

## 2018-09-17 DIAGNOSIS — Z72.0 TOBACCO ABUSE: ICD-10-CM

## 2018-09-17 DIAGNOSIS — R05.9 COUGH: ICD-10-CM

## 2018-09-17 DIAGNOSIS — E66.3 OVER WEIGHT: ICD-10-CM

## 2018-09-17 DIAGNOSIS — R68.83 CHILLS: ICD-10-CM

## 2018-09-17 DIAGNOSIS — M25.50 ARTHRALGIA, UNSPECIFIED JOINT: ICD-10-CM

## 2018-09-17 DIAGNOSIS — J02.9 ACUTE SORE THROAT: ICD-10-CM

## 2018-09-17 LAB
QUICKVUE INFLUENZA TEST: NEGATIVE
S PYO AG THROAT QL: NEGATIVE
VALID INTERNAL CONTROL?: YES
VALID INTERNAL CONTROL?: YES

## 2018-09-17 RX ORDER — VARENICLINE TARTRATE 0.5 MG/1
0.5 TABLET, FILM COATED ORAL 2 TIMES DAILY
Qty: 60 TAB | Refills: 2 | Status: SHIPPED | OUTPATIENT
Start: 2018-09-17 | End: 2018-12-16

## 2018-09-17 RX ORDER — AZITHROMYCIN 250 MG/1
TABLET, FILM COATED ORAL
Qty: 6 TAB | Refills: 0 | Status: SHIPPED | OUTPATIENT
Start: 2018-09-17 | End: 2018-09-22

## 2018-09-17 RX ORDER — BENZONATATE 200 MG/1
200 CAPSULE ORAL
Qty: 21 CAP | Refills: 0 | Status: SHIPPED | OUTPATIENT
Start: 2018-09-17 | End: 2018-09-24

## 2018-09-17 RX ORDER — METHYLPREDNISOLONE 4 MG/1
TABLET ORAL
Qty: 1 DOSE PACK | Refills: 0 | Status: SHIPPED | OUTPATIENT
Start: 2018-09-17 | End: 2018-12-31 | Stop reason: ALTCHOICE

## 2018-09-17 RX ORDER — DICLOFENAC SODIUM 75 MG/1
75 TABLET, DELAYED RELEASE ORAL DAILY
Qty: 30 TAB | Refills: 5 | Status: SHIPPED | OUTPATIENT
Start: 2018-09-17 | End: 2019-01-23

## 2018-09-17 NOTE — PROGRESS NOTES
Chief Complaint   Patient presents with    Sore Throat       Reviewed Record in preparation for visit and have obtained necessary documentation. Identified pt with two pt identifiers (Name @ )    Health Maintenance Due   Topic    MEDICARE YEARLY EXAM     BREAST CANCER SCRN MAMMOGRAM     FOBT Q 1 YEAR AGE 54-65     Influenza Age 5 to Adult          1. Have you been to the ER, urgent care clinic since your last visit? Hospitalized since your last visit? no    2. Have you seen or consulted any other health care providers outside of the [address] [place] [address] since your last visit? Include any pap smears or colon screening. Dr. Pelon Saucedo in July at [facility] for heart blockage.
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
## 2018-09-17 NOTE — MR AVS SNAPSHOT
Fariba Fairbanks 
 
 
 [address] [facility] 13 
[phone] Patient: Angy Mcknight MRN: [id number] QFI:[id number] Visit Information Date & Time Provider Department Dept. Phone Encounter #  
 9/17/2018  3:00 PM Sonal Mabry MD [address] [place] [phone] [id number] Follow-up Instructions Return if symptoms worsen or fail to improve, for URI. Upcoming Health Maintenance Date Due  
 MEDICARE YEARLY EXAM 3/14/2018 BREAST CANCER SCRN MAMMOGRAM 7/7/2018 FOBT Q 1 YEAR AGE 50-75 7/7/2018 Influenza Age 5 to Adult 4/17/2019* Pneumococcal 19-64 Highest Risk (1 of 3 - PCV13) 4/5/2021* PAP AKA CERVICAL CYTOLOGY 2/8/2019 DTaP/Tdap/Td series (2 - Td) 8/10/2026 *Topic was postponed. The date shown is not the original due date. Allergies as of 9/17/2018  Review Complete On: 9/17/2018 By: Ramon Liz LPN Severity Noted Reaction Type Reactions Amoxil [Amoxicillin]  02/06/2013    Diarrhea Codeine  01/11/2011    Nausea and Vomiting Current Immunizations  Reviewed on 4/17/2013 Name Date Td 2/10/2014 Not reviewed this visit You Were Diagnosed With   
  
 Codes Comments Upper respiratory tract infection, unspecified type    -  Primary ICD-10-CM: J06.9 ICD-9-CM: 465.9 Cough     ICD-10-CM: R05 ICD-9-CM: 786.2 Acute sore throat     ICD-10-CM: J02.9 ICD-9-CM: 275 Fatigue, unspecified type     ICD-10-CM: R53.83 ICD-9-CM: 780.79 Chills     ICD-10-CM: R68.83 ICD-9-CM: 780.64 Tobacco abuse     ICD-10-CM: Z72.0 ICD-9-CM: 305.1 Over weight     ICD-10-CM: N81.7 ICD-9-CM: 278.02 Arthralgia, unspecified joint     ICD-10-CM: M25.50 ICD-9-CM: 719.40 Vitals BP Pulse Temp Resp Height(growth percentile) Weight(growth percentile)  117/80 (BP 1 Location: Right arm, BP Patient Position: Sitting) 92 99 °F (37.2 °C) (Oral) 18 5' 4\" (1.626 m) 155 lb (70.3 kg) SpO2 BMI OB Status Smoking Status 96% 26.61 kg/m2 Hysterectomy Current Every Day Smoker Vitals History BMI and BSA Data Body Mass Index Body Surface Area  
 26.61 kg/m 2 1.78 m 2 Preferred Pharmacy Pharmacy Name Phone [address], [address] [place] [address] [address] [phone] Your Updated Medication List  
  
   
This list is accurate as of 9/17/18  4:06 PM.  Always use your most recent med list.  
  
  
  
  
 ALPRAZolam 0.5 mg tablet Commonly known as:  XANAX  
TAKE 1 TABLET BY MOUTH TWICE DAILY AS NEEDED FOR ANXIETY  
  
 aspirin 325 mg tablet Commonly known as:  ASPIRIN Take 325 mg by mouth daily. azithromycin 250 mg tablet Commonly known as:  Janusz Listen Take 2 tablets today, then take 1 tablet daily  
  
 benzonatate 200 mg capsule Commonly known as:  TESSALON Take 1 Cap by mouth three (3) times daily as needed for Cough for up to 7 days. clopidogrel 75 mg Tab Commonly known as:  PLAVIX TK 1 T PO QD  
  
 diclofenac EC 75 mg EC tablet Commonly known as:  VOLTAREN Take 1 Tab by mouth daily. escitalopram oxalate 20 mg tablet Commonly known as:  Cloria Kohut TAKE 1 TABLET BY MOUTH EVERY DAY AS NEEDED FOR ANXIETY  
  
 famotidine 40 mg tablet Commonly known as:  PEPCID  
TAKE 1 T PO QD  
  
 lisinopril 10 mg tablet Commonly known as:  PRINIVIL, ZESTRIL  
TAKE 1 TABLET BY MOUTH DAILY  
  
 methylPREDNISolone 4 mg tablet Commonly known as:  Lesley Allis Take as directed  
  
 metoclopramide HCl 5 mg tablet Commonly known as:  REGLAN  
TAKE 1 TABLET BY MOUTH THREE TIMES DAILY BEFORE MEALS  
  
 omeprazole 20 mg capsule Commonly known as:  PriLOSEC Take 1 Cap by mouth daily. prochlorperazine 10 mg tablet Commonly known as:  COMPAZINE Take 1 Tab by mouth every six (6) hours as needed for Nausea. simvastatin 40 mg tablet Commonly known as:  ZOCOR  
TAKE 1 TABLET BY MOUTH EVERY NIGHT * varenicline 0.5 mg (11)- 1 mg (42) Dspk Commonly known as:  CHANTIX STARTER MISHA Take as directed * varenicline 0.5 mg tablet Commonly known as:  Mary Kay Goodwin Take 1 Tab by mouth two (2) times a day for 90 days. * Notice: This list has 2 medication(s) that are the same as other medications prescribed for you. Read the directions carefully, and ask your doctor or other care provider to review them with you. Prescriptions Sent to Pharmacy Refills  
 diclofenac EC (VOLTAREN) 75 mg EC tablet 5 Sig: Take 1 Tab by mouth daily. Class: Normal  
 Pharmacy: [place] [address] Ph #: [phone] Route: Oral  
 benzonatate (TESSALON) 200 mg capsule 0 Sig: Take 1 Cap by mouth three (3) times daily as needed for Cough for up to 7 days. Class: Normal  
 Pharmacy: [place] [address] Ph #: [phone] Route: Oral  
 azithromycin (ZITHROMAX) 250 mg tablet 0 Sig: Take 2 tablets today, then take 1 tablet daily Class: Normal  
 Pharmacy: [address] [facility] [place] [address] Ph #: [phone]  
 methylPREDNISolone (MEDROL DOSEPACK) 4 mg tablet 0 Sig: Take as directed Class: Normal  
 Pharmacy: [address] [facility] [place] [address] Ph #: [phone]  
 varenicline (CHANTIX) 0.5 mg tablet 2 Sig: Take 1 Tab by mouth two (2) times a day for 90 days. Class: Normal  
 Pharmacy: [place] [address] Ph #: [phone] Route: Oral  
  
We Performed the Following AMB POC RAPID INFLUENZA TEST [09299 CPT(R)] AMB POC RAPID STREP A [37855 CPT(R)] Follow-up Instructions Return if symptoms worsen or fail to improve, for URI. Patient Instructions Learning About COPD and Upper Respiratory Infections What are upper respiratory infections? An upper respiratory infection, also called a URI, is an infection of the nose, sinuses, or throat. Viruses or bacteria can cause URIs. Colds, the flu, and sinusitis are examples of URIs. These infections are spread by coughs, sneezes, and close contact. How do these infections affect COPD? A URI can worsen COPD symptoms, such as having too much mucus in your lungs, coughing, or being short of breath. What can you do to manage most infections at home? · Do not smoke. Avoid secondhand smoke. · Take an over-the-counter pain medicine, such as acetaminophen (Tylenol), ibuprofen (Advil, Motrin), or naproxen (Aleve). Read and follow all instructions on the label. · Be careful when taking over-the-counter cold or flu medicines and Tylenol at the same time. Many of these medicines have acetaminophen, which is Tylenol. Read the labels to make sure that you are not taking more than the recommended dose. Too much acetaminophen (Tylenol) can be harmful. · If your doctor prescribed antibiotics, take them as directed. Do not stop taking them just because you feel better. You need to take the full course of antibiotics. · Ask your doctor about cough medicines and decongestants. Some doctors recommend these medicines, while others feel that they do not help. · Learn breathing techniques for COPD, such as breathing through pursed lips. These techniques can help you breathe easier. What can you do to prevent these infections? Stay healthy · Do not smoke. This is the most important step you can take to prevent more damage to your lungs. If you need help quitting, talk to your doctor about stop-smoking programs and medicines. These can increase your chances of quitting for good. · Avoid secondhand smoke, air pollution, and high altitudes. Also avoid cold, dry air and hot, humid air. Stay at home with your windows closed when air pollution is bad. · Get a flu shot every year. · Get a pneumococcal vaccine shot. If you have had one before, ask your doctor whether you need another dose. · If you must be around people with colds or the flu, wash your hands often. Exercise and eat well · If your doctor recommends it, get more exercise. Walking is a good choice. Bit by bit, increase the amount you walk every day. Try for at least 30 minutes on most days of the week. · Eat regular, well-balanced meals. Eating right keeps your energy levels up and helps your body fight infection. · Get plenty of rest and sleep. Follow-up care is a key part of your treatment and safety. Be sure to make and go to all appointments, and call your doctor if you are having problems. It's also a good idea to know your test results and keep a list of the medicines you take. Where can you learn more? Go to http://yuliya-bryan.info/. Enter O198 in the search box to learn more about \"Learning About COPD and Upper Respiratory Infections. \" Current as of: December 6, 2017 Content Version: 11.7 © 3101-2368 [employer]. Care instructions adapted under license by [facility] (which disclaims liability or warranty for this information). If you have questions about a medical condition or this instruction, always ask your healthcare professional. [place] [address] any warranty or liability for your use of this information. Introducing [facility]! [employer] introduces Mobile Complete patient portal. Now you can access parts of your medical record, email your doctor's office, and request medication refills online. 1. In your internet browser, go to https://[employer]. [facility]. [employer] 2. Click on the First Time User? Click Here link in the Sign In box. You will see the New Member Sign Up page. 3. Enter your Kickboard Access Code exactly as it appears below. You will not need to use this code after youve completed the sign-up process. If you do not sign up before the expiration date, you must request a new code. · Kickboard Access Code: [email or web address] Expires: 12/16/2018  4:06 PM 
 
4. Enter the last four digits of your Social Security Number (xxxx) and Date of Birth (mm/dd/yyyy) as indicated and click Submit. You will be taken to the next sign-up page. 5. Create a Kickboard ID. This will be your Kickboard login ID and cannot be changed, so think of one that is secure and easy to remember. 6. Create a Kickboard password. You can change your password at any time. 7. Enter your Password Reset Question and Answer. This can be used at a later time if you forget your password. 8. Enter your e-mail address. You will receive e-mail notification when new information is available in [address] 9. Click Sign Up. You can now view and download portions of your medical record. 10. Click the Download Summary menu link to download a portable copy of your medical information. If you have questions, please visit the Frequently Asked Questions section of the Kickboard website. Remember, Kickboard is NOT to be used for urgent needs. For medical emergencies, dial 911. Now available from your iPhone and Android! Please provide this summary of care documentation to your next provider. Your primary care clinician is listed as Yissel Watt. If you have any questions after today's visit, please call [phone].

## 2018-09-17 NOTE — LETTER
NOTIFICATION RETURN TO WORK  
9/17/2018 4:04 PM 
 
[facility] [address] [facility] [phone] To Whom It May Concern: 
 
Di Tena is currently under the care of JENA Ray. She will return to work on: 9/19/18 If there are questions or concerns please have the patient contact our office. Sincerely, Leighann Gutierrez MD

## 2018-09-17 NOTE — TELEPHONE ENCOUNTER
Patient is requesting call back from [facility], requesting medication be sent to the Pharmacy for her  Coughing/Scrachy sore throat  Headache  Chills  Sluggish/tired    Last seen : April 02, 2018  Patient was informed that Doctor would like to see her before giving her antibiotics, scheduled for visit today, but still requesting a call back   Best call back : [phone]  Pharmacy verified

## 2018-09-17 NOTE — PATIENT INSTRUCTIONS
Learning About COPD and Upper Respiratory Infections  What are upper respiratory infections? An upper respiratory infection, also called a URI, is an infection of the nose, sinuses, or throat. Viruses or bacteria can cause URIs. Colds, the flu, and sinusitis are examples of URIs. These infections are spread by coughs, sneezes, and close contact. How do these infections affect COPD? A URI can worsen COPD symptoms, such as having too much mucus in your lungs, coughing, or being short of breath. What can you do to manage most infections at home? · Do not smoke. Avoid secondhand smoke. · Take an over-the-counter pain medicine, such as acetaminophen (Tylenol), ibuprofen (Advil, Motrin), or naproxen (Aleve). Read and follow all instructions on the label. · Be careful when taking over-the-counter cold or flu medicines and Tylenol at the same time. Many of these medicines have acetaminophen, which is Tylenol. Read the labels to make sure that you are not taking more than the recommended dose. Too much acetaminophen (Tylenol) can be harmful. · If your doctor prescribed antibiotics, take them as directed. Do not stop taking them just because you feel better. You need to take the full course of antibiotics. · Ask your doctor about cough medicines and decongestants. Some doctors recommend these medicines, while others feel that they do not help. · Learn breathing techniques for COPD, such as breathing through pursed lips. These techniques can help you breathe easier. What can you do to prevent these infections? Stay healthy  · Do not smoke. This is the most important step you can take to prevent more damage to your lungs. If you need help quitting, talk to your doctor about stop-smoking programs and medicines. These can increase your chances of quitting for good. · Avoid secondhand smoke, air pollution, and high altitudes. Also avoid cold, dry air and hot, humid air.  Stay at home with your windows closed when air pollution is bad. · Get a flu shot every year. · Get a pneumococcal vaccine shot. If you have had one before, ask your doctor whether you need another dose. · If you must be around people with colds or the flu, wash your hands often. Exercise and eat well  · If your doctor recommends it, get more exercise. Walking is a good choice. Bit by bit, increase the amount you walk every day. Try for at least 30 minutes on most days of the week. · Eat regular, well-balanced meals. Eating right keeps your energy levels up and helps your body fight infection. · Get plenty of rest and sleep. Follow-up care is a key part of your treatment and safety. Be sure to make and go to all appointments, and call your doctor if you are having problems. It's also a good idea to know your test results and keep a list of the medicines you take. Where can you learn more? Go to http://yuliya-bryan.info/. Enter W737 in the search box to learn more about \"Learning About COPD and Upper Respiratory Infections. \"  Current as of: December 6, 2017  Content Version: 11.7  © 1149-8858 [employer]. Care instructions adapted under license by [employer] (which disclaims liability or warranty for this information). If you have questions about a medical condition or this instruction, always ask your healthcare professional. [address] any warranty or liability for your use of this information.

## 2018-09-17 NOTE — TELEPHONE ENCOUNTER
[phone] spoke to patient notified needs office visit for evaluation per patient she has appointment today at [facility]

## 2018-09-29 RX ORDER — LISINOPRIL 10 MG/1
TABLET ORAL
Qty: 30 TAB | Refills: 5 | Status: SHIPPED | OUTPATIENT
Start: 2018-09-29 | End: 2018-11-06 | Stop reason: SDUPTHER

## 2018-10-08 DIAGNOSIS — E78.00 HYPERCHOLESTEROLEMIA: ICD-10-CM

## 2018-10-08 DIAGNOSIS — F41.9 ANXIETY: ICD-10-CM

## 2018-10-09 RX ORDER — SIMVASTATIN 40 MG/1
TABLET, FILM COATED ORAL
Qty: 90 TAB | Refills: 1 | Status: SHIPPED | OUTPATIENT
Start: 2018-10-09 | End: 2019-04-10 | Stop reason: SDUPTHER

## 2018-10-09 RX ORDER — ALPRAZOLAM 0.5 MG/1
TABLET ORAL
Qty: 60 TAB | Refills: 0 | Status: SHIPPED | OUTPATIENT
Start: 2018-10-11 | End: 2018-11-07 | Stop reason: SDUPTHER

## 2018-10-19 NOTE — TELEPHONE ENCOUNTER
----- Message from Travis Cortes sent at 10/19/2018  5:05 PM EDT -----  Regarding: Dr Samina Sy  Pt is following up on request for refill \"Famotidine\"    Best contact # [phone]  .   Requested Prescriptions     Pending Prescriptions Disp Refills    famotidine (PEPCID) 40 mg tablet [Pharmacy Med Name: FAMOTIDINE 40MG TABLETS] 90 Tab 0     Sig: TAKE 1 TABLET BY MOUTH EVERY DAY

## 2018-10-23 RX ORDER — FAMOTIDINE 40 MG/1
TABLET, FILM COATED ORAL
Qty: 90 TAB | Refills: 1 | Status: SHIPPED | OUTPATIENT
Start: 2018-10-23 | End: 2019-05-01 | Stop reason: SDUPTHER

## 2018-10-29 DIAGNOSIS — F41.9 ANXIETY: ICD-10-CM

## 2018-10-29 RX ORDER — ALPRAZOLAM 0.5 MG/1
TABLET ORAL
Qty: 60 TAB | Refills: 0 | Status: CANCELLED | OUTPATIENT
Start: 2018-10-29

## 2018-10-29 NOTE — TELEPHONE ENCOUNTER
Patient is requesting the medication be refill as soon as possible, she is completely out of refills and there is lot of stress going on  Best call back : [phone]  LOV: September 17, 2018  .   Requested Prescriptions     Pending Prescriptions Disp Refills    ALPRAZolam (XANAX) 0.5 mg tablet [Pharmacy Med Name: ALPRAZOLAM 0.5MG TABLETS] 60 Tab 0     Sig: TAKE 1 TABLET BY MOUTH TWICE DAILY AS NEEDED FOR ANXIETY

## 2018-10-31 NOTE — TELEPHONE ENCOUNTER
[phone] spoke to patient notified her that she's 10 days early for refill but per patient she had death in the family and her client death as well and she lost her job so she's been taking little extra xanax I advised will send message to Dr. Shane Solis

## 2018-11-06 DIAGNOSIS — F41.9 ANXIETY: ICD-10-CM

## 2018-11-06 NOTE — TELEPHONE ENCOUNTER
Patient is calling requesting her medication, she is very  upset , requesting call back , she says she has to do this every month and this is a special circumstances   Best call back : [phone]

## 2018-11-08 RX ORDER — ALPRAZOLAM 0.5 MG/1
TABLET ORAL
Qty: 60 TAB | Refills: 0 | Status: SHIPPED | OUTPATIENT
Start: 2018-11-08 | End: 2018-12-07 | Stop reason: SDUPTHER

## 2018-11-08 RX ORDER — LISINOPRIL 10 MG/1
TABLET ORAL
Qty: 90 TAB | Refills: 1 | Status: SHIPPED | OUTPATIENT
Start: 2018-11-08 | End: 2019-08-17

## 2018-11-08 NOTE — TELEPHONE ENCOUNTER
[phone] KRIVLQGFR called in prescription for patient xanax via VM    Requested Prescriptions     Signed Prescriptions Disp Refills    lisinopril (PRINIVIL, ZESTRIL) 10 mg tablet 90 Tab 1     Sig: TAKE 1 TABLET BY MOUTH DAILY     Authorizing Provider: Jourdan Chapa    ALPRAZolam (XANAX) 0.5 mg tablet 60 Tab 0     Sig: TAKE 1 TABLET BY MOUTH TWICE DAILY AS NEEDED FOR ANXIETY     Authorizing Provider: Jourdan Chapa        [phone] attempted to call patient no answer no VM     If patient called back advised prescription sent to pharmacy

## 2018-11-08 NOTE — TELEPHONE ENCOUNTER
[phone] Jasbir called in prescription for patient xanax via VM       [phone] attempted to call patient no answer no VM      If patient called back advised prescription sent to pharmacy

## 2018-11-28 DIAGNOSIS — K21.9 GASTROESOPHAGEAL REFLUX DISEASE WITHOUT ESOPHAGITIS: ICD-10-CM

## 2018-11-30 RX ORDER — METOCLOPRAMIDE 5 MG/1
TABLET ORAL
Qty: 90 TAB | Refills: 1 | Status: SHIPPED | OUTPATIENT
Start: 2018-11-30 | End: 2019-04-28 | Stop reason: SDUPTHER

## 2018-12-07 DIAGNOSIS — F41.9 ANXIETY: ICD-10-CM

## 2018-12-07 NOTE — TELEPHONE ENCOUNTER
Patient is calling, stating that she is completely out of the medication, would like it to be refilled before weekend. Best call back : [phone]  LOV:September 17, 2018  Last refill :  11/8/18  .   Requested Prescriptions     Pending Prescriptions Disp Refills    ALPRAZolam (XANAX) 0.5 mg tablet [Pharmacy Med Name: ALPRAZOLAM 0.5MG TABLETS] 60 Tab 0     Sig: TAKE 1 TABLET BY MOUTH TWICE DAILY AS NEEDED FOR ANXIETY

## 2018-12-08 RX ORDER — ALPRAZOLAM 0.5 MG/1
TABLET ORAL
Qty: 60 TAB | Refills: 0 | Status: SHIPPED | OUTPATIENT
Start: 2018-12-08 | End: 2018-12-31 | Stop reason: SDUPTHER

## 2018-12-10 ENCOUNTER — TELEPHONE (OUTPATIENT)
Dept: FAMILY MEDICINE CLINIC | Age: 48
End: 2018-12-10

## 2018-12-10 NOTE — TELEPHONE ENCOUNTER
Chief Complaint   Patient presents with    Medication Refill     Alprazolam ( Xanax ) 0.5 mg tablet     [address] [phone] was left an voice message of prescription as written by Dr. Michelle Yang for Alprazolam ( Xanax ) 0.5 mg tablet, take one tablet by mouth twice daily as needed for anxiety , 60 tablets no refill. Patient informed that medication has been sent to pharmacy.

## 2018-12-10 NOTE — TELEPHONE ENCOUNTER
----- Message from Kunal Acosta sent at 12/10/2018  3:30 PM EST -----  Regarding: Remedios Solorzano MD/ refill  Pt would need a refill on alprazolam. Pt uses [facility] on [address] (inforamtion is on file). Pt has already run out of this medication. Pts contact [phone].

## 2018-12-31 ENCOUNTER — OFFICE VISIT (OUTPATIENT)
Dept: FAMILY MEDICINE CLINIC | Age: 48
End: 2018-12-31

## 2018-12-31 VITALS
BODY MASS INDEX: 25.92 KG/M2 | SYSTOLIC BLOOD PRESSURE: 134 MMHG | WEIGHT: 151.8 LBS | TEMPERATURE: 98 F | OXYGEN SATURATION: 96 % | HEART RATE: 71 BPM | RESPIRATION RATE: 17 BRPM | DIASTOLIC BLOOD PRESSURE: 80 MMHG | HEIGHT: 64 IN

## 2018-12-31 DIAGNOSIS — Z13.220 LIPID SCREENING: ICD-10-CM

## 2018-12-31 DIAGNOSIS — C34.11 MALIGNANT NEOPLASM OF UPPER LOBE OF RIGHT LUNG (HCC): Primary | ICD-10-CM

## 2018-12-31 DIAGNOSIS — F41.9 ANXIETY: ICD-10-CM

## 2018-12-31 DIAGNOSIS — R53.83 MALAISE AND FATIGUE: ICD-10-CM

## 2018-12-31 DIAGNOSIS — R59.9 ENLARGED LYMPH NODE: ICD-10-CM

## 2018-12-31 DIAGNOSIS — Z72.0 TOBACCO ABUSE: ICD-10-CM

## 2018-12-31 DIAGNOSIS — R53.81 MALAISE AND FATIGUE: ICD-10-CM

## 2018-12-31 DIAGNOSIS — J44.9 CHRONIC OBSTRUCTIVE PULMONARY DISEASE, UNSPECIFIED COPD TYPE (HCC): ICD-10-CM

## 2018-12-31 RX ORDER — ALPRAZOLAM 0.5 MG/1
0.5 TABLET ORAL 2 TIMES DAILY
Qty: 60 TAB | Refills: 2 | Status: SHIPPED | OUTPATIENT
Start: 2018-12-31 | End: 2019-01-28 | Stop reason: SDUPTHER

## 2018-12-31 RX ORDER — NITROGLYCERIN 0.4 MG/1
TABLET SUBLINGUAL
Refills: 3 | COMMUNITY
Start: 2018-10-22 | End: 2019-07-01

## 2018-12-31 RX ORDER — ALPRAZOLAM 0.5 MG/1
0.5 TABLET ORAL 2 TIMES DAILY
Qty: 60 TAB | Refills: 2 | Status: SHIPPED | OUTPATIENT
Start: 2018-12-31 | End: 2018-12-31 | Stop reason: SDUPTHER
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
## 2018-12-31 NOTE — PATIENT INSTRUCTIONS
Using a Dry Powder Inhaler: Care Instructions  Your Care Instructions    A dry powder inhaler lets you breathe medicine into your lungs quickly. Inhaled medicine works faster than the same medicine in a pill. An inhaler lets you take less medicine than you would need if you took it as a pill. A dry powder inhaler delivers medicine in the form of a fine powder. Dry powder inhalers are breath-activated. This means when you breathe in through the inhaler, the inhaler releases medicine into your lungs. Dry powder inhalers come in different shapes and sizes. For some, you need to add the medicine to the inhaler each time you use it. Other dry powder inhalers come with a supply of medicine already in them. But for these, you will need to [facility]" each dose of medicine each time you use it. How you load a dose depends on the type of inhaler you have. Follow-up care is a key part of your treatment and safety. Be sure to make and go to all appointments, and call your doctor if you are having problems. It's also a good idea to know your test results and keep a list of the medicines you take. How can you care for yourself at home? To get started  · Talk with your doctor, respiratory therapist, or pharmacist to be sure you are using your inhaler the right way. It might help if you practice using it in front of a mirror. Use the inhaler exactly as prescribed. · Check that you have the correct medicine. If you use several inhalers, put a label on each one so that you know which one to use at the right time. · Keep your inhaler in a cool, dry place. Do not store your inhaler in the bathroom. Moisture in the air can cause the dry powder to clump together. This can clog the inhaler. · Keep track of how much medicine is in the inhaler. Some dry powder inhalers have dose counters that show how many doses are left.  If your inhaler does not have a dose counter, your doctor or pharmacist can teach you how to keep track of how much medicine is left. · If you are using a steroid medicine in the inhaler, gargle and rinse out your mouth with water after use. Do not swallow the water. Swallowing the water will increase the chance that the medicine will get into your bloodstream. This may make it more likely that you will have side effects. · Some powder may build up on the inhaler. You do not need to clean the inhaler every day. Follow your doctor's or pharmacist's instructions for cleaning your inhaler. To use a dry powder inhaler  · Remove the inhaler cap, if there is one. · Add or load a dose of medicine as directed by your health care provider. · Tilt your head back a little, and breathe out slowly and completely. Hold the inhaler away from your mouth while you breathe out. Do not breathe out into the inhaler. This can blow some of the powder medicine out of the inhaler. The moisture in your breath also can cause the dry powder to clump together and clog the inhaler. · Place the inhaler's mouthpiece in your mouth. Close your lips tightly around the mouthpiece. · Inhale quickly and deeply through your mouth for 2 or 3 seconds. This pulls the powder from the inhaler into your lungs. After you have inhaled the powder, take the inhaler out of your mouth. · Hold your breath for 10 seconds. This will let the medicine settle in your lungs. Then slowly breathe out through pursed lips. Make sure not to breathe out into the inhaler. · Repeat these steps if you need to take a second dose. Where can you learn more? Go to http://yuliya-bryan.info/. Enter 0489 33 97 26 in the search box to learn more about \"Using a Dry Powder Inhaler: Care Instructions. \"  Current as of: November 12, 2017  Content Version: 11.8  © 2550-2251 [employer]. Care instructions adapted under license by [employer] (which disclaims liability or warranty for this information).  If you have questions about a medical condition or this instruction, always ask your healthcare professional. [place] [address] any warranty or liability for your use of this information.

## 2018-12-31 NOTE — PROGRESS NOTES
Chief Complaint   Patient presents with    Lung Cancer     coordination of care prior to biopsy    Nicotine Dependence     pt plans to stop smoking tonight. Pt reports being anxious about quitting     1. Have you been to the ER, urgent care clinic since your last visit? Hospitalized since your last visit? no    2. Have you seen or consulted any other health care providers outside of the [address] [place] [address] since your last visit? Include any pap smears or colon screening.  [address] [employer]

## 2019-01-23 ENCOUNTER — APPOINTMENT (OUTPATIENT)
Dept: ULTRASOUND IMAGING | Age: 49
End: 2019-01-23
Attending: INTERNAL MEDICINE
Payer: MEDICARE

## 2019-01-23 ENCOUNTER — ANESTHESIA EVENT (OUTPATIENT)
Dept: ENDOSCOPY | Age: 49
End: 2019-01-23
Payer: MEDICARE

## 2019-01-23 ENCOUNTER — HOSPITAL ENCOUNTER (OUTPATIENT)
Age: 49
Setting detail: OUTPATIENT SURGERY
Discharge: HOME | End: 2019-01-23
Attending: INTERNAL MEDICINE | Admitting: INTERNAL MEDICINE
Payer: MEDICARE

## 2019-01-23 ENCOUNTER — ANESTHESIA (OUTPATIENT)
Dept: ENDOSCOPY | Age: 49
End: 2019-01-23
Payer: MEDICARE

## 2019-01-23 VITALS
HEART RATE: 81 BPM | RESPIRATION RATE: 18 BRPM | SYSTOLIC BLOOD PRESSURE: 149 MMHG | WEIGHT: 147.13 LBS | OXYGEN SATURATION: 91 % | HEIGHT: 64 IN | DIASTOLIC BLOOD PRESSURE: 82 MMHG | BODY MASS INDEX: 25.12 KG/M2 | TEMPERATURE: 97.3 F

## 2019-01-23 PROCEDURE — 88342 IMHCHEM/IMCYTCHM 1ST ANTB: CPT

## 2019-01-23 PROCEDURE — 88341 IMHCHEM/IMCYTCHM EA ADD ANTB: CPT

## 2019-01-23 PROCEDURE — 88312 SPECIAL STAINS GROUP 1: CPT

## 2019-01-23 PROCEDURE — 76060000032 HC ANESTHESIA 0.5 TO 1 HR: Performed by: INTERNAL MEDICINE

## 2019-01-23 PROCEDURE — 74011250636 HC RX REV CODE- 250/636

## 2019-01-23 PROCEDURE — 76040000007: Performed by: INTERNAL MEDICINE

## 2019-01-23 PROCEDURE — 88305 TISSUE EXAM BY PATHOLOGIST: CPT

## 2019-01-23 PROCEDURE — 74011250636 HC RX REV CODE- 250/636: Performed by: INTERNAL MEDICINE

## 2019-01-23 PROCEDURE — 77030036673 HC NDL BIOP ENDOSC SHRKCOR PRELD COVD -E: Performed by: INTERNAL MEDICINE

## 2019-01-23 PROCEDURE — 88333 PATH CONSLTJ SURG CYTO XM 1: CPT

## 2019-01-23 PROCEDURE — 77030036672 HC NDL BIOP ENDOSC SHRKCOR COVD -D: Performed by: INTERNAL MEDICINE

## 2019-01-23 RX ORDER — SODIUM CHLORIDE 0.9 % (FLUSH) 0.9 %
5-40 SYRINGE (ML) INJECTION AS NEEDED
Status: DISCONTINUED | OUTPATIENT
Start: 2019-01-23 | End: 2019-01-23 | Stop reason: HOSPADM

## 2019-01-23 RX ORDER — MIDAZOLAM HYDROCHLORIDE 1 MG/ML
.25-1 INJECTION, SOLUTION INTRAMUSCULAR; INTRAVENOUS
Status: DISCONTINUED | OUTPATIENT
Start: 2019-01-23 | End: 2019-01-23 | Stop reason: HOSPADM

## 2019-01-23 RX ORDER — FLUMAZENIL 0.1 MG/ML
0.2 INJECTION INTRAVENOUS
Status: DISCONTINUED | OUTPATIENT
Start: 2019-01-23 | End: 2019-01-23 | Stop reason: HOSPADM

## 2019-01-23 RX ORDER — FENTANYL CITRATE 50 UG/ML
200 INJECTION, SOLUTION INTRAMUSCULAR; INTRAVENOUS
Status: DISCONTINUED | OUTPATIENT
Start: 2019-01-23 | End: 2019-01-23 | Stop reason: HOSPADM

## 2019-01-23 RX ORDER — LIDOCAINE HYDROCHLORIDE 20 MG/ML
INJECTION, SOLUTION EPIDURAL; INFILTRATION; INTRACAUDAL; PERINEURAL AS NEEDED
Status: DISCONTINUED | OUTPATIENT
Start: 2019-01-23 | End: 2019-01-23 | Stop reason: HOSPADM

## 2019-01-23 RX ORDER — SODIUM CHLORIDE 9 MG/ML
INJECTION, SOLUTION INTRAVENOUS
Status: DISCONTINUED | OUTPATIENT
Start: 2019-01-23 | End: 2019-01-23 | Stop reason: HOSPADM

## 2019-01-23 RX ORDER — DEXTROMETHORPHAN/PSEUDOEPHED 2.5-7.5/.8
1.2 DROPS ORAL
Status: DISCONTINUED | OUTPATIENT
Start: 2019-01-23 | End: 2019-01-23 | Stop reason: HOSPADM

## 2019-01-23 RX ORDER — SODIUM CHLORIDE 0.9 % (FLUSH) 0.9 %
5-40 SYRINGE (ML) INJECTION EVERY 8 HOURS
Status: DISCONTINUED | OUTPATIENT
Start: 2019-01-23 | End: 2019-01-23 | Stop reason: HOSPADM

## 2019-01-23 RX ORDER — SODIUM CHLORIDE 9 MG/ML
100 INJECTION, SOLUTION INTRAVENOUS CONTINUOUS
Status: DISCONTINUED | OUTPATIENT
Start: 2019-01-23 | End: 2019-01-23 | Stop reason: HOSPADM

## 2019-01-23 RX ORDER — PROPOFOL 10 MG/ML
INJECTION, EMULSION INTRAVENOUS AS NEEDED
Status: DISCONTINUED | OUTPATIENT
Start: 2019-01-23 | End: 2019-01-23 | Stop reason: HOSPADM

## 2019-01-23 RX ORDER — EPINEPHRINE 0.1 MG/ML
1 INJECTION INTRACARDIAC; INTRAVENOUS
Status: DISCONTINUED | OUTPATIENT
Start: 2019-01-23 | End: 2019-01-23 | Stop reason: HOSPADM

## 2019-01-23 RX ORDER — ATROPINE SULFATE 0.1 MG/ML
0.5 INJECTION INTRAVENOUS
Status: DISCONTINUED | OUTPATIENT
Start: 2019-01-23 | End: 2019-01-23 | Stop reason: HOSPADM

## 2019-01-23 RX ORDER — NALOXONE HYDROCHLORIDE 0.4 MG/ML
0.4 INJECTION, SOLUTION INTRAMUSCULAR; INTRAVENOUS; SUBCUTANEOUS
Status: DISCONTINUED | OUTPATIENT
Start: 2019-01-23 | End: 2019-01-23 | Stop reason: HOSPADM

## 2019-01-23 RX ADMIN — PROPOFOL 50 MG: 10 INJECTION, EMULSION INTRAVENOUS at 14:26

## 2019-01-23 RX ADMIN — PROPOFOL 50 MG: 10 INJECTION, EMULSION INTRAVENOUS at 14:28

## 2019-01-23 RX ADMIN — PROPOFOL 50 MG: 10 INJECTION, EMULSION INTRAVENOUS at 14:52

## 2019-01-23 RX ADMIN — PROPOFOL 50 MG: 10 INJECTION, EMULSION INTRAVENOUS at 14:20

## 2019-01-23 RX ADMIN — LIDOCAINE HYDROCHLORIDE 60 MG: 20 INJECTION, SOLUTION EPIDURAL; INFILTRATION; INTRACAUDAL; PERINEURAL at 14:15

## 2019-01-23 RX ADMIN — PROPOFOL 50 MG: 10 INJECTION, EMULSION INTRAVENOUS at 14:34

## 2019-01-23 RX ADMIN — PROPOFOL 70 MG: 10 INJECTION, EMULSION INTRAVENOUS at 14:15

## 2019-01-23 RX ADMIN — LIDOCAINE HYDROCHLORIDE 100 MG: 20 INJECTION, SOLUTION EPIDURAL; INFILTRATION; INTRACAUDAL; PERINEURAL at 14:24

## 2019-01-23 RX ADMIN — PROPOFOL 30 MG: 10 INJECTION, EMULSION INTRAVENOUS at 14:16

## 2019-01-23 RX ADMIN — PROPOFOL 50 MG: 10 INJECTION, EMULSION INTRAVENOUS at 14:31

## 2019-01-23 RX ADMIN — SODIUM CHLORIDE: 9 INJECTION, SOLUTION INTRAVENOUS at 14:15

## 2019-01-23 RX ADMIN — PROPOFOL 50 MG: 10 INJECTION, EMULSION INTRAVENOUS at 14:18

## 2019-01-23 RX ADMIN — PROPOFOL 50 MG: 10 INJECTION, EMULSION INTRAVENOUS at 14:46

## 2019-01-23 RX ADMIN — PROPOFOL 50 MG: 10 INJECTION, EMULSION INTRAVENOUS at 14:38

## 2019-01-23 RX ADMIN — SODIUM CHLORIDE: 9 INJECTION, SOLUTION INTRAVENOUS at 13:54

## 2019-01-23 RX ADMIN — PROPOFOL 50 MG: 10 INJECTION, EMULSION INTRAVENOUS at 14:42

## 2019-01-23 RX ADMIN — PROPOFOL 50 MG: 10 INJECTION, EMULSION INTRAVENOUS at 14:49
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
## 2019-01-23 NOTE — PROCEDURES
[address]  [address]       Endoscopic Ultrasound    NAME:  Faustina Calhoun   :   1970   MRN:   [id number]       Procedure Type: Endoscopic Ultrasound    Indications: Abnormal CT scan showing enlarged subcarinal node, h/o non small cell lung cancer    Pre-operative Diagnosis: see indication above    Post-operative Diagnosis:  See findings below    : Tereza Melendez MD    Referring Provider: -Dr. Beverley Pretty MD    Procedure Details:      Anethesia/Sedation:  MAC anesthesia Propofol      Procedure Details   After infom consent was obtained for the procedure, with all risks and benefits of procedure explained the patient was taken to the endoscopy suite and placed in the left lateral decubitus position. Following sequential administration of sedation as per above, the EGD then liniear echoendoscope was inserted into the mouth and advanced under direct vision to third portion of the duodenum. A careful inspection was made as the gastroscope was withdrawn, including a retroflexed view of the proximal stomach; findings and interventions are described below.    Findings:     Endoscopic:   Esophagus:normal   Stomach: normal    Duodenum/jejunum: normal      Ultrasound:   Esophagus: normal findings   Stomach: normal findings   Pancreas:     Areas examined: the entire gland    Parenchyma: -normal    Pancreatic Duct: normal findings   Liver:     Parenchyma: normal    Gallbladder: surgically absent    Bile Duct: the entire biliary tree, normal      mediastinum               Lymph Node: there was 2.5 x1.1 cm subcarinal hypoechoic lesion/adenopathy, then I performed FNB ( fine needle biopsy) with 25 gauge needle first; one pass then 3 passes with 22 gauge needle ( [employer], [employer]), samples for pathology, preliminary reading showed cells suspicious for malignancy  Rest of mediastinum was normal within reach of my scope         Specimen Removed:  as above    Complications: None. EBL:  None.     Interventions: see findings    Recommendations: await final pathology report  To follow up with her medical oncologist, Dr. Grayson Valenzuela in 2 weeks as scheduled  Resume plavix on 1/28/2019     Signed By: Edwin Jones MD     1/23/2019  2:57 PM

## 2019-01-23 NOTE — DISCHARGE INSTRUCTIONS
[address]  174 [facility], [address] [place] [address]    Endoscopic ultrasound DISCHARGE INSTRUCTIONS    Nanda Cruz  [id number]  1970    Discomfort:  Sore throat- warm salt water gargle  redness at IV site- apply warm compress to area; if redness or soreness persist- contact your physician  Gaseous discomfort- walking, belching will help relieve any discomfort  You may not operate a vehicle for 12 hours  You may not engage in an occupation involving machinery or appliances for rest of today  You may not drink alcoholic beverages for at least 12 hours  Avoid making any critical decisions for at least 24 hour  DIET  You may eat and drink now and after you leave. You may resume your regular diet - however -  remember your colon is empty and a heavy meal will produce gas. Avoid these foods:  vegetables, fried / greasy foods, carbonated drinks    ACTIVITY  You may resume your normal daily activities   Spend the remainder of the day resting -  avoid any strenuous activity. CALL M.D. ANY SIGN OF   Increasing pain, nausea, vomiting  Abdominal distension (swelling)  New increased bleeding (oral or rectal)  Fever (chills)  Pain in chest area    Shortness of breath    Follow-up Instructions:   Call Dr. Elyse Wick for any questions or problems. Telephone # [phone]  Resume aspirin tomorrow on 1/24/19  Resume plavix on 1/28/2019    ENDOSCOPY FINDINGS:   Your endoscopy showed node in mediastinum, I biopsied.             Signed By: Elyse Wick MD     1/23/2019  3:05 PM

## 2019-01-23 NOTE — PROGRESS NOTES

## 2019-01-23 NOTE — ANESTHESIA POSTPROCEDURE EVALUATION
Post-Anesthesia Evaluation and Assessment    Patient: Dilip Marie MRN: [id number]  SSN: [id number]    YOB: 1970  Age: 46 y.o. Sex: female      I have evaluated the patient and they are stable and ready for discharge from the PACU. Cardiovascular Function/Vital Signs  Visit Vitals  /75   Pulse 96   Temp 36.3 °C (97.3 °F)   Resp 15   Ht 5' 4\" (1.626 m)   Wt 66.7 kg (147 lb 2 oz)   SpO2 94%   BMI 25.25 kg/m²       Patient is status post MAC anesthesia for Procedure(s):  ENDOSCOPIC ULTRASOUND (EUS)  ESOPHAGOGASTRODUODENOSCOPY (EGD)  FINE NEEDLE ASPIRATION. Nausea/Vomiting: None    Postoperative hydration reviewed and adequate. Pain:  Pain Scale 1: Numeric (0 - 10) (01/23/19 1505)  Pain Intensity 1: 0 (01/23/19 1505)   Managed    Neurological Status: At baseline    Mental Status, Level of Consciousness: Alert and  oriented to person, place, and time    Pulmonary Status:   O2 Device: Nasal cannula (01/23/19 1505)   Adequate oxygenation and airway patent    Complications related to anesthesia: None    Post-anesthesia assessment completed. No concerns    Signed By: Qamar Ramsey MD     January 23, 2019              Procedure(s):  ENDOSCOPIC ULTRASOUND (EUS)  ESOPHAGOGASTRODUODENOSCOPY (EGD)  FINE NEEDLE ASPIRATION.     <BSHSIANPOST>    Visit Vitals  /75   Pulse 96   Temp 36.3 °C (97.3 °F)   Resp 15   Ht 5' 4\" (1.626 m)   Wt 66.7 kg (147 lb 2 oz)   SpO2 94%   BMI 25.25 kg/m²

## 2019-01-23 NOTE — ANESTHESIA PREPROCEDURE EVALUATION
Anesthetic History   No history of anesthetic complications            Review of Systems / Medical History  Patient summary reviewed, nursing notes reviewed and pertinent labs reviewed    Pulmonary  Within defined limits                 Neuro/Psych   Within defined limits           Cardiovascular    Hypertension          CAD         GI/Hepatic/Renal     GERD           Endo/Other        Arthritis  Pertinent negatives: No morbid obesity   Other Findings              Physical Exam    Airway  Mallampati: II  TM Distance: > 6 cm  Neck ROM: normal range of motion   Mouth opening: Normal     Cardiovascular  Regular rate and rhythm,  S1 and S2 normal,  no murmur, click, rub, or gallop             Dental  No notable dental hx       Pulmonary  Breath sounds clear to auscultation               Abdominal  GI exam deferred       Other Findings            Anesthetic Plan    ASA: 2  Anesthesia type: MAC            Anesthetic plan and risks discussed with: Patient

## 2019-01-23 NOTE — ROUTINE PROCESS
Margarita Simpson  1970  [id number]    Situation:  Verbal report received from: Phillip Fields RN  Procedure: Procedure(s):  ENDOSCOPIC ULTRASOUND (EUS)  ESOPHAGOGASTRODUODENOSCOPY (EGD)  FINE NEEDLE ASPIRATION    Background:    Preoperative diagnosis: ESOPHAGUS MASS  Postoperative diagnosis: 1.- Medistinal Lymph Node    :  Dr. Loretta Acosta  Assistant(s): Endoscopy Technician-1: Yecenia Moody  Endoscopy RN-1: Brenna Brito RN    Specimens: * No specimens in log *  H. Pylori  no  Assessment:  Intra-procedure medications     Anesthesia gave intra-procedure sedation and medications, see anesthesia flow sheet yes    Intravenous fluids: NS@ KVO     Vital signs stable     Abdominal assessment: round and soft     Recommendation:  Discharge patient per MD order.     Family or Friend   Permission to share finding with family or friend yes

## 2019-01-28 DIAGNOSIS — F41.9 ANXIETY: ICD-10-CM

## 2019-01-30 RX ORDER — ALPRAZOLAM 0.5 MG/1
TABLET ORAL
Qty: 60 TAB | Refills: 0 | Status: SHIPPED | OUTPATIENT
Start: 2019-01-30 | End: 2019-03-27 | Stop reason: SDUPTHER

## 2019-01-30 NOTE — TELEPHONE ENCOUNTER
[phone] Jasbir called in prescription for patient xanax via     Requested Prescriptions     Signed Prescriptions Disp Refills    ALPRAZolam (XANAX) 0.5 mg tablet 60 Tab 0     Sig: TAKE 1 TABLET BY MOUTH TWICE DAILY AS NEEDED ANXIETY     Authorizing Provider: Xiao Ortiz

## 2019-03-27 ENCOUNTER — OFFICE VISIT (OUTPATIENT)
Dept: FAMILY MEDICINE CLINIC | Age: 49
End: 2019-03-27

## 2019-03-27 VITALS
HEART RATE: 77 BPM | DIASTOLIC BLOOD PRESSURE: 85 MMHG | WEIGHT: 154 LBS | TEMPERATURE: 98 F | OXYGEN SATURATION: 97 % | HEIGHT: 64 IN | SYSTOLIC BLOOD PRESSURE: 132 MMHG | RESPIRATION RATE: 18 BRPM | BODY MASS INDEX: 26.29 KG/M2

## 2019-03-27 DIAGNOSIS — F41.9 ANXIETY: ICD-10-CM

## 2019-03-27 DIAGNOSIS — E27.8 ADRENAL MASS (HCC): ICD-10-CM

## 2019-03-27 DIAGNOSIS — M25.50 ARTHRALGIA, UNSPECIFIED JOINT: ICD-10-CM

## 2019-03-27 DIAGNOSIS — E78.00 HYPERCHOLESTEROLEMIA: Primary | ICD-10-CM

## 2019-03-27 DIAGNOSIS — T78.40XA ALLERGIC STATE, INITIAL ENCOUNTER: ICD-10-CM

## 2019-03-27 DIAGNOSIS — R53.83 MALAISE AND FATIGUE: ICD-10-CM

## 2019-03-27 DIAGNOSIS — Z01.419 ENCOUNTER FOR GYNECOLOGICAL EXAMINATION WITHOUT ABNORMAL FINDING: ICD-10-CM

## 2019-03-27 DIAGNOSIS — C34.11 MALIGNANT NEOPLASM OF UPPER LOBE OF RIGHT LUNG (HCC): ICD-10-CM

## 2019-03-27 DIAGNOSIS — I10 HTN (HYPERTENSION), BENIGN: ICD-10-CM

## 2019-03-27 DIAGNOSIS — R53.81 MALAISE AND FATIGUE: ICD-10-CM

## 2019-03-27 DIAGNOSIS — Z00.00 ENCOUNTER FOR MEDICARE ANNUAL WELLNESS EXAM: ICD-10-CM

## 2019-03-27 DIAGNOSIS — Z12.31 SCREENING MAMMOGRAM, ENCOUNTER FOR: ICD-10-CM

## 2019-03-27 DIAGNOSIS — J44.9 CHRONIC OBSTRUCTIVE PULMONARY DISEASE, UNSPECIFIED COPD TYPE (HCC): ICD-10-CM

## 2019-03-27 DIAGNOSIS — Z72.0 TOBACCO ABUSE: ICD-10-CM

## 2019-03-27 DIAGNOSIS — I25.10 CORONARY ARTERY DISEASE WITHOUT ANGINA PECTORIS, UNSPECIFIED VESSEL OR LESION TYPE, UNSPECIFIED WHETHER NATIVE OR TRANSPLANTED HEART: ICD-10-CM

## 2019-03-27 RX ORDER — ALPRAZOLAM 0.5 MG/1
TABLET ORAL
Qty: 60 TAB | Refills: 0 | Status: SHIPPED | OUTPATIENT
Start: 2019-03-27 | End: 2019-05-10 | Stop reason: SDUPTHER

## 2019-03-27 RX ORDER — CLOPIDOGREL BISULFATE 75 MG/1
TABLET ORAL
COMMUNITY
Start: 2019-03-18 | End: 2019-10-07 | Stop reason: SDUPTHER

## 2019-03-27 RX ORDER — FLUTICASONE PROPIONATE 50 MCG
2 SPRAY, SUSPENSION (ML) NASAL DAILY
Qty: 1 BOTTLE | Refills: 5 | Status: SHIPPED | OUTPATIENT
Start: 2019-03-27 | End: 2019-03-27 | Stop reason: SDUPTHER

## 2019-03-27 RX ORDER — ALBUTEROL SULFATE 90 UG/1
2 AEROSOL, METERED RESPIRATORY (INHALATION)
Qty: 1 INHALER | Refills: 5 | Status: SHIPPED | OUTPATIENT
Start: 2019-03-27 | End: 2021-04-07 | Stop reason: SDUPTHER

## 2019-03-27 RX ORDER — DICLOFENAC SODIUM 75 MG/1
TABLET, DELAYED RELEASE ORAL
COMMUNITY
Start: 2019-02-08 | End: 2019-07-01

## 2019-03-27 RX ORDER — LEVOCETIRIZINE DIHYDROCHLORIDE 5 MG/1
5 TABLET, FILM COATED ORAL DAILY
Qty: 30 TAB | Refills: 5 | Status: SHIPPED | OUTPATIENT
Start: 2019-03-27 | End: 2019-03-27 | Stop reason: SDUPTHER

## 2019-03-27 NOTE — PROGRESS NOTES
Chief Complaint   Patient presents with    Anxiety     follow up       1. Have you been to the ER, urgent care clinic since your last visit? Hospitalized since your last visit? [facility] 1 month ago for cold    2. Have you seen or consulted any other health care providers outside of the [address] [place] [address] since your last visit? Include any pap smears or colon screening.  Patient had endoscopy 1/23

## 2019-03-27 NOTE — PATIENT INSTRUCTIONS
Allergic Reaction: Care Instructions  Your Care Instructions    An allergic reaction is an excessive response from your immune system to a medicine, chemical, food, insect bite, or other substance. A reaction can range from mild to life-threatening. Some people have a mild rash, hives, and itching or stomach cramps. In severe reactions, swelling of your tongue and throat can close up your airway so that you cannot breathe. Follow-up care is a key part of your treatment and safety. Be sure to make and go to all appointments, and call your doctor if you are having problems. It's also a good idea to know your test results and keep a list of the medicines you take. How can you care for yourself at home? · If you know what caused your allergic reaction, be sure to avoid it. Your allergy may become more severe each time you have a reaction. · Take an over-the-counter antihistamine, such as cetirizine (Zyrtec) or loratadine (Claritin), to treat mild symptoms. Read and follow directions on the label. Some antihistamines can make you feel sleepy. Do not give antihistamines to a child unless you have checked with your doctor first. Mild symptoms include sneezing or an itchy or runny nose; an itchy mouth; a few hives or mild itching; and mild nausea or stomach discomfort. · Do not scratch hives or a rash. Put a cold, moist towel on them or take cool baths to relieve itching. Put ice packs on hives, swelling, or insect stings for 10 to 15 minutes at a time. Put a thin cloth between the ice pack and your skin. Do not take hot baths or showers. They will make the itching worse. · Your doctor may prescribe a shot of epinephrine to carry with you in case you have a severe reaction. Learn how to give yourself the shot and keep it with you at all times. Make sure it is not . · Go to the emergency room every time you have a severe reaction, even if you have used your shot of epinephrine and are feeling better. Symptoms can come back after a shot. · Wear medical alert jewelry that lists your allergies. You can buy this at most drugstores. · If your child has a severe allergy, make sure that his or her teachers, babysitters, coaches, and other caregivers know about the allergy. They should have an epinephrine shot, know how and when to give it, and have a plan to take your child to the hospital.  When should you call for help? Give an epinephrine shot if:    · You think you are having a severe allergic reaction.     · You have symptoms in more than one body area, such as mild nausea and an itchy mouth.    After giving an epinephrine shot call 911, even if you feel better.   Call 911 if:    · You have symptoms of a severe allergic reaction. These may include:  ? Sudden raised, red areas (hives) all over your body. ? Swelling of the throat, mouth, lips, or tongue. ? Trouble breathing. ? Passing out (losing consciousness). Or you may feel very lightheaded or suddenly feel weak, confused, or restless.     · You have been given an epinephrine shot, even if you feel better.    Call your doctor now or seek immediate medical care if:    · You have symptoms of an allergic reaction, such as:  ? A rash or hives (raised, red areas on the skin). ? Itching. ? Swelling. ? Belly pain, nausea, or vomiting.    Watch closely for changes in your health, and be sure to contact your doctor if:    · You do not get better as expected. Where can you learn more? Go to http://yuliya-bryan.info/. Enter E001 in the search box to learn more about \"Allergic Reaction: Care Instructions. \"  Current as of: June 27, 2018  Content Version: 11.9  © 0168-7642 [employer]. Care instructions adapted under license by [employer] (which disclaims liability or warranty for this information).  If you have questions about a medical condition or this instruction, always ask your healthcare professional. [facility] disclaims any warranty or liability for your use of this information.
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
## 2019-03-27 NOTE — ASSESSMENT & PLAN NOTE
This condition is managed by Specialist.  Key Oncology Meds             prochlorperazine (COMPAZINE) 10 mg tablet Take 1 Tab by mouth every six (6) hours as needed for Nausea.         Key Pain Meds             diclofenac EC (VOLTAREN) 75 mg EC tablet (Taking)         No results found for: WBC, WBCT, WBCPOC, ANEU, HGB, HGBPOC, HCT, HCTPOC, PLT, PLTPOC, CREA, CREAPOC, CREATEXT, BUN, IBUN, BUNPOC, GPT, ALTPOC, ALT, SGOT, ASTPOC, ALB, ALBPOC, PREALB, PSA, PSA2, [id number], PSALT, HGBEXT, HCTEXT, PLTEXT

## 2019-03-28 RX ORDER — LEVOCETIRIZINE DIHYDROCHLORIDE 5 MG/1
TABLET, FILM COATED ORAL
Qty: 90 TAB | Refills: 5 | Status: SHIPPED | OUTPATIENT
Start: 2019-03-28 | End: 2019-07-01

## 2019-03-28 RX ORDER — FLUTICASONE PROPIONATE 50 MCG
SPRAY, SUSPENSION (ML) NASAL
Qty: 1 BOTTLE | Refills: 5 | Status: SHIPPED | OUTPATIENT
Start: 2019-03-28 | End: 2019-07-01
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
## 2019-04-28 DIAGNOSIS — K21.9 GASTROESOPHAGEAL REFLUX DISEASE WITHOUT ESOPHAGITIS: ICD-10-CM

## 2019-04-30 RX ORDER — METOCLOPRAMIDE 5 MG/1
TABLET ORAL
Qty: 90 TAB | Refills: 0 | Status: SHIPPED | OUTPATIENT
Start: 2019-04-30 | End: 2019-07-14 | Stop reason: SDUPTHER

## 2019-05-02 RX ORDER — FAMOTIDINE 40 MG/1
TABLET, FILM COATED ORAL
Qty: 90 TAB | Refills: 0 | Status: SHIPPED | OUTPATIENT
Start: 2019-05-02 | End: 2019-08-05 | Stop reason: SDUPTHER

## 2019-05-10 DIAGNOSIS — F41.9 ANXIETY: ICD-10-CM

## 2019-05-12 RX ORDER — ALPRAZOLAM 0.5 MG/1
TABLET ORAL
Qty: 60 TAB | Refills: 0 | Status: SHIPPED | OUTPATIENT
Start: 2019-05-12 | End: 2019-06-12 | Stop reason: SDUPTHER

## 2019-05-13 NOTE — TELEPHONE ENCOUNTER
[phone] Jasbir called in prescription for patient alparazolam via     Requested Prescriptions     Signed Prescriptions Disp Refills    ALPRAZolam (XANAX) 0.5 mg tablet 60 Tab 0     Sig: TAKE 1 TABLET BY MOUTH TWICE DAILY AS NEEDED     Authorizing Provider: Luca Powell

## 2019-06-12 DIAGNOSIS — F41.9 ANXIETY: ICD-10-CM

## 2019-06-13 RX ORDER — ALPRAZOLAM 0.5 MG/1
TABLET ORAL
Qty: 60 TAB | Refills: 2 | Status: SHIPPED | OUTPATIENT
Start: 2019-06-13 | End: 2019-09-10 | Stop reason: SDUPTHER

## 2019-06-13 NOTE — TELEPHONE ENCOUNTER
[phone] ariana called in prescription for patient xanax via     Requested Prescriptions     Signed Prescriptions Disp Refills    ALPRAZolam (XANAX) 0.5 mg tablet 60 Tab 2     Sig: TAKE 1 TABLET BY MOUTH TWICE DAILY AS NEEDED FOR ANXIETY     Authorizing Provider: Jairo Jackson

## 2019-07-01 ENCOUNTER — OFFICE VISIT (OUTPATIENT)
Dept: FAMILY MEDICINE CLINIC | Age: 49
End: 2019-07-01

## 2019-07-01 VITALS
RESPIRATION RATE: 18 BRPM | HEIGHT: 64 IN | WEIGHT: 147 LBS | DIASTOLIC BLOOD PRESSURE: 71 MMHG | OXYGEN SATURATION: 96 % | BODY MASS INDEX: 25.1 KG/M2 | HEART RATE: 94 BPM | SYSTOLIC BLOOD PRESSURE: 138 MMHG | TEMPERATURE: 98.1 F

## 2019-07-01 DIAGNOSIS — Z72.0 TOBACCO ABUSE: ICD-10-CM

## 2019-07-01 DIAGNOSIS — J44.9 CHRONIC OBSTRUCTIVE PULMONARY DISEASE, UNSPECIFIED COPD TYPE (HCC): ICD-10-CM

## 2019-07-01 DIAGNOSIS — R53.81 MALAISE AND FATIGUE: ICD-10-CM

## 2019-07-01 DIAGNOSIS — E78.00 HYPERCHOLESTEROLEMIA: ICD-10-CM

## 2019-07-01 DIAGNOSIS — F41.9 ANXIETY: ICD-10-CM

## 2019-07-01 DIAGNOSIS — C34.11 MALIGNANT NEOPLASM OF UPPER LOBE OF RIGHT LUNG (HCC): Primary | ICD-10-CM

## 2019-07-01 DIAGNOSIS — I10 HTN (HYPERTENSION), BENIGN: ICD-10-CM

## 2019-07-01 DIAGNOSIS — R53.83 MALAISE AND FATIGUE: ICD-10-CM

## 2019-07-01 DIAGNOSIS — G25.81 RLS (RESTLESS LEGS SYNDROME): ICD-10-CM

## 2019-07-01 NOTE — PATIENT INSTRUCTIONS
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
## 2019-07-01 NOTE — PROGRESS NOTES
Chief Complaint   Patient presents with    Cholesterol Problem    Anxiety       Reviewed Record in preparation for visit and have obtained necessary documentation. Identified pt with two pt identifiers (Name @ )    Health Maintenance Due   Topic    Pneumococcal 0-64 years (1 of 1 - PPSV23)    Shingrix Vaccine Age 50> (1 of 2)    BREAST CANCER SCRN MAMMOGRAM     FOBT Q 1 YEAR AGE 50-75     PAP AKA CERVICAL CYTOLOGY          1. Have you been to the ER, urgent care clinic since your last visit? Hospitalized since your last visit? no    2. Have you seen or consulted any other health care providers outside of the [address] [place] [address] since your last visit? Include any pap smears or colon screening.  no

## 2019-07-14 DIAGNOSIS — K21.9 GASTROESOPHAGEAL REFLUX DISEASE WITHOUT ESOPHAGITIS: ICD-10-CM

## 2019-07-15 RX ORDER — METOCLOPRAMIDE 5 MG/1
TABLET ORAL
Qty: 90 TAB | Refills: 0 | Status: SHIPPED | OUTPATIENT
Start: 2019-07-15 | End: 2019-10-07 | Stop reason: SDUPTHER

## 2019-08-06 RX ORDER — FAMOTIDINE 40 MG/1
TABLET, FILM COATED ORAL
Qty: 90 TAB | Refills: 0 | Status: SHIPPED | OUTPATIENT
Start: 2019-08-06 | End: 2019-08-17

## 2019-08-17 ENCOUNTER — OFFICE VISIT (OUTPATIENT)
Dept: FAMILY MEDICINE CLINIC | Age: 49
End: 2019-08-17

## 2019-08-17 VITALS
HEIGHT: 64 IN | WEIGHT: 151.8 LBS | BODY MASS INDEX: 25.92 KG/M2 | DIASTOLIC BLOOD PRESSURE: 81 MMHG | HEART RATE: 92 BPM | SYSTOLIC BLOOD PRESSURE: 125 MMHG | TEMPERATURE: 97 F | RESPIRATION RATE: 12 BRPM | OXYGEN SATURATION: 95 %

## 2019-08-17 DIAGNOSIS — C34.11 MALIGNANT NEOPLASM OF UPPER LOBE OF RIGHT LUNG (HCC): ICD-10-CM

## 2019-08-17 DIAGNOSIS — J44.9 CHRONIC OBSTRUCTIVE PULMONARY DISEASE, UNSPECIFIED COPD TYPE (HCC): ICD-10-CM

## 2019-08-17 DIAGNOSIS — Z72.0 TOBACCO ABUSE: ICD-10-CM

## 2019-08-17 DIAGNOSIS — J06.9 UPPER RESPIRATORY TRACT INFECTION, UNSPECIFIED TYPE: Primary | ICD-10-CM

## 2019-08-17 RX ORDER — DOXYCYCLINE 100 MG/1
100 TABLET ORAL 2 TIMES DAILY
Qty: 20 TAB | Refills: 0 | Status: SHIPPED | OUTPATIENT
Start: 2019-08-17 | End: 2019-09-03 | Stop reason: SDUPTHER
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
## 2019-08-17 NOTE — PATIENT INSTRUCTIONS
Bronchitis: Care Instructions  Your Care Instructions    Bronchitis is inflammation of the bronchial tubes, which carry air to the lungs. The tubes swell and produce mucus, or phlegm. The mucus and inflamed bronchial tubes make you cough. You may have trouble breathing. Most cases of bronchitis are caused by viruses like those that cause colds. Antibiotics usually do not help and they may be harmful. Bronchitis usually develops rapidly and lasts about 2 to 3 weeks in otherwise healthy people. Follow-up care is a key part of your treatment and safety. Be sure to make and go to all appointments, and call your doctor if you are having problems. It's also a good idea to know your test results and keep a list of the medicines you take. How can you care for yourself at home? · Take all medicines exactly as prescribed. Call your doctor if you think you are having a problem with your medicine. · Get some extra rest.  · Take an over-the-counter pain medicine, such as acetaminophen (Tylenol), ibuprofen (Advil, Motrin), or naproxen (Aleve) to reduce fever and relieve body aches. Read and follow all instructions on the label. · Do not take two or more pain medicines at the same time unless the doctor told you to. Many pain medicines have acetaminophen, which is Tylenol. Too much acetaminophen (Tylenol) can be harmful. · Take an over-the-counter cough medicine that contains dextromethorphan to help quiet a dry, hacking cough so that you can sleep. Avoid cough medicines that have more than one active ingredient. Read and follow all instructions on the label. · Breathe moist air from a humidifier, hot shower, or sink filled with hot water. The heat and moisture will thin mucus so you can cough it out. · Do not smoke. Smoking can make bronchitis worse. If you need help quitting, talk to your doctor about stop-smoking programs and medicines. These can increase your chances of quitting for good.   When should you call for help? Call 911 anytime you think you may need emergency care. For example, call if:    · You have severe trouble breathing.    Call your doctor now or seek immediate medical care if:    · You have new or worse trouble breathing.     · You cough up dark brown or bloody mucus (sputum).     · You have a new or higher fever.     · You have a new rash.    Watch closely for changes in your health, and be sure to contact your doctor if:    · You cough more deeply or more often, especially if you notice more mucus or a change in the color of your mucus.     · You are not getting better as expected. Where can you learn more? Go to http://yuliya-bryan.info/. Enter H333 in the search box to learn more about \"Bronchitis: Care Instructions. \"  Current as of: September 5, 2018  Content Version: 12.1  © 6481-4459 [employer]. Care instructions adapted under license by [employer] (which disclaims liability or warranty for this information). If you have questions about a medical condition or this instruction, always ask your healthcare professional. [address] any warranty or liability for your use of this information.

## 2019-08-18 LAB
ALBUMIN SERPL-MCNC: 4.1 G/DL (ref 3.5–5.5)
ALBUMIN/GLOB SERPL: 1.6 {RATIO} (ref 1.2–2.2)
ALP SERPL-CCNC: 85 IU/L (ref 39–117)
ALT SERPL-CCNC: 17 IU/L (ref 0–32)
AST SERPL-CCNC: 26 IU/L (ref 0–40)
BASOPHILS # BLD AUTO: 0 X10E3/UL (ref 0–0.2)
BASOPHILS NFR BLD AUTO: 1 %
BILIRUB SERPL-MCNC: 0.3 MG/DL (ref 0–1.2)
BUN SERPL-MCNC: 13 MG/DL (ref 6–24)
BUN/CREAT SERPL: 13 (ref 9–23)
CALCIUM SERPL-MCNC: 9.3 MG/DL (ref 8.7–10.2)
CHLORIDE SERPL-SCNC: 96 MMOL/L (ref 96–106)
CHOLEST SERPL-MCNC: 136 MG/DL (ref 100–199)
CO2 SERPL-SCNC: 26 MMOL/L (ref 20–29)
CREAT SERPL-MCNC: 1.01 MG/DL (ref 0.57–1)
EOSINOPHIL # BLD AUTO: 0 X10E3/UL (ref 0–0.4)
EOSINOPHIL NFR BLD AUTO: 1 %
ERYTHROCYTE [DISTWIDTH] IN BLOOD BY AUTOMATED COUNT: 18.6 % (ref 12.3–15.4)
GLOBULIN SER CALC-MCNC: 2.6 G/DL (ref 1.5–4.5)
GLUCOSE SERPL-MCNC: 84 MG/DL (ref 65–99)
HCT VFR BLD AUTO: 43.4 % (ref 34–46.6)
HDLC SERPL-MCNC: 48 MG/DL
HGB BLD-MCNC: 13.2 G/DL (ref 11.1–15.9)
IMM GRANULOCYTES # BLD AUTO: 0 X10E3/UL (ref 0–0.1)
IMM GRANULOCYTES NFR BLD AUTO: 0 %
INTERPRETATION, 910389: NORMAL
LDLC SERPL CALC-MCNC: 72 MG/DL (ref 0–99)
LYMPHOCYTES # BLD AUTO: 0.5 X10E3/UL (ref 0.7–3.1)
LYMPHOCYTES NFR BLD AUTO: 9 %
MCH RBC QN AUTO: 26.8 PG (ref 26.6–33)
MCHC RBC AUTO-ENTMCNC: 30.4 G/DL (ref 31.5–35.7)
MCV RBC AUTO: 88 FL (ref 79–97)
MONOCYTES # BLD AUTO: 0.4 X10E3/UL (ref 0.1–0.9)
MONOCYTES NFR BLD AUTO: 6 %
NEUTROPHILS # BLD AUTO: 5.2 X10E3/UL (ref 1.4–7)
NEUTROPHILS NFR BLD AUTO: 83 %
PLATELET # BLD AUTO: 125 X10E3/UL (ref 150–450)
POTASSIUM SERPL-SCNC: 5.2 MMOL/L (ref 3.5–5.2)
PROT SERPL-MCNC: 6.7 G/DL (ref 6–8.5)
RBC # BLD AUTO: 4.92 X10E6/UL (ref 3.77–5.28)
SODIUM SERPL-SCNC: 135 MMOL/L (ref 134–144)
T4 FREE SERPL-MCNC: 1 NG/DL (ref 0.82–1.77)
TRIGL SERPL-MCNC: 79 MG/DL (ref 0–149)
TSH SERPL DL<=0.005 MIU/L-ACNC: 4.37 UIU/ML (ref 0.45–4.5)
VLDLC SERPL CALC-MCNC: 16 MG/DL (ref 5–40)
WBC # BLD AUTO: 6.3 X10E3/UL (ref 3.4–10.8)

## 2019-08-19 LAB — SPECIMEN STATUS REPORT, ROLRST: NORMAL

## 2019-08-20 NOTE — PROGRESS NOTES
Authorization form received from [facility] and faxed back [phone] and [phone]

## 2019-08-21 LAB
FERRITIN SERPL-MCNC: 43 NG/ML (ref 15–150)
IRON SATN MFR SERPL: 5 % (ref 15–55)
IRON SERPL-MCNC: 23 UG/DL (ref 27–159)
SPECIMEN STATUS REPORT, ROLRST: NORMAL
TIBC SERPL-MCNC: 467 UG/DL (ref 250–450)
UIBC SERPL-MCNC: 444 UG/DL (ref 131–425)

## 2019-08-22 DIAGNOSIS — E61.1 IRON DEFICIENCY: Primary | ICD-10-CM

## 2019-08-22 DIAGNOSIS — E61.1 LOW SERUM IRON: Primary | ICD-10-CM

## 2019-08-22 RX ORDER — LANOLIN ALCOHOL/MO/W.PET/CERES
CREAM (GRAM) TOPICAL
COMMUNITY
End: 2019-08-22 | Stop reason: SDUPTHER

## 2019-08-22 NOTE — PROGRESS NOTES
[phone] Spoke to patient Identified pt with two pt identifiers (Name @ )  Notified patient of her lab results and understand  Pend medication and labs to Dr Juliann Gooden

## 2019-08-22 NOTE — PROGRESS NOTES
Pt has low iron  She needs to take ferrous sulfate 325 mg BID #60 1 refills  We need to recheck her Iron levels again in 1 month.

## 2019-08-22 NOTE — TELEPHONE ENCOUNTER
----- Message from Deepti Robert MD sent at 8/18/2019  9:22 PM EDT -----  Labs are stable  Can we add Iron and ferrtin

## 2019-08-23 RX ORDER — LANOLIN ALCOHOL/MO/W.PET/CERES
325 CREAM (GRAM) TOPICAL 2 TIMES DAILY
Qty: 60 TAB | Refills: 1 | Status: SHIPPED | OUTPATIENT
Start: 2019-08-23 | End: 2020-08-20

## 2019-08-23 NOTE — TELEPHONE ENCOUNTER
[phone] notified patient of Dr Paul Sosa recommendation and understand    Her RLS is due to low ferritin  This will help her Ferrous sulfate and patient understand

## 2019-08-23 NOTE — TELEPHONE ENCOUNTER
Her RLS is due to low ferritin  This will help her  Gabapentin is not indicated for RLS she needs to get her ferrtin levels up and her symptoms will improve

## 2019-09-03 DIAGNOSIS — J06.9 UPPER RESPIRATORY TRACT INFECTION, UNSPECIFIED TYPE: ICD-10-CM

## 2019-09-05 RX ORDER — DOXYCYCLINE 100 MG/1
TABLET ORAL
Qty: 20 TAB | Refills: 0 | Status: SHIPPED | OUTPATIENT
Start: 2019-09-05 | End: 2019-10-09 | Stop reason: ALTCHOICE

## 2019-09-10 DIAGNOSIS — F41.9 ANXIETY: ICD-10-CM

## 2019-09-12 RX ORDER — ALPRAZOLAM 0.5 MG/1
TABLET ORAL
Qty: 60 TAB | Refills: 0 | Status: SHIPPED | OUTPATIENT
Start: 2019-09-12 | End: 2019-10-09 | Stop reason: SDUPTHER

## 2019-09-12 NOTE — TELEPHONE ENCOUNTER
[phone] Jasbir called in prescription for patient xanax via     Requested Prescriptions     Signed Prescriptions Disp Refills    ALPRAZolam (XANAX) 0.5 mg tablet 60 Tab 0     Sig: TAKE 1 TABLET BY MOUTH TWICE DAILY AS NEEDED FOR ANXIETY     Authorizing Provider: Cali Velez

## 2019-09-19 DIAGNOSIS — F41.9 ANXIETY: ICD-10-CM

## 2019-09-20 RX ORDER — ESCITALOPRAM OXALATE 20 MG/1
TABLET ORAL
Qty: 90 TAB | Refills: 1 | Status: SHIPPED | OUTPATIENT
Start: 2019-09-20 | End: 2019-12-24

## 2019-09-23 ENCOUNTER — TELEPHONE (OUTPATIENT)
Dept: FAMILY MEDICINE CLINIC | Age: 49
End: 2019-09-23

## 2019-09-23 NOTE — TELEPHONE ENCOUNTER
----- Message from Valeria Busby sent at 9/23/2019  9:57 AM EDT -----  Regarding: Dr. Nithya Heart telephone   General Message/Vendor Calls    Caller's first and last name:  Rhoda Marie    Reason for call:   Maybe a allergic reaction to a medication that was prescribed     Callback required yes/no and why:  YES    Best contact number(s):  [phone]    Details to clarify the request:      Valeria Busby

## 2019-09-23 NOTE — TELEPHONE ENCOUNTER
[phone] patient is taking Trellegy inhaler and  Per patient she is itching, hives I advised she needs to be seen but she wants me to send you a message

## 2019-10-07 ENCOUNTER — TELEPHONE (OUTPATIENT)
Dept: FAMILY MEDICINE CLINIC | Age: 49
End: 2019-10-07

## 2019-10-07 DIAGNOSIS — K21.9 GASTROESOPHAGEAL REFLUX DISEASE WITHOUT ESOPHAGITIS: ICD-10-CM

## 2019-10-07 NOTE — TELEPHONE ENCOUNTER
[phone] spoke to [facility] patient Massimo Uriarte inhaler is giving her rash and zyrtec not helping I advised needs office visit patient understand I offered 10/7/2019 at 1:30P but per patient can't come in patient has appointment 10/9/2019 at 3:45PM patient understand

## 2019-10-07 NOTE — TELEPHONE ENCOUNTER
----- Message from Hong Mendoza sent at 10/7/2019 12:00 PM EDT -----  Regarding: /Refill   Medication Refill    Caller (if not patient):Pt      Relationship of caller (if not patient):Pt       Best contact number(s):[phone]      Name of medication and dosage if known: 1.Metoclopramide 5mg 2. Clopidogrel 75mg 3. Alprazolam. 0.5mg      Is patient out of this medication (yes/no):Yes (Metoclopramide and Clopidogrel)       Pharmacy name:[facility] pharmacy     Pharmacy listed in chart? (yes/no):Yes  Pharmacy phone number: [phone]      Details to clarify the request:    Hong Mendoza  . Shakeel Martinez   Requested Prescriptions     Pending Prescriptions Disp Refills    metoclopramide HCl (REGLAN) 5 mg tablet 90 Tab 0    clopidogrel (PLAVIX) 75 mg tab

## 2019-10-07 NOTE — TELEPHONE ENCOUNTER
----- Message from Lul Bobby sent at 10/7/2019 12:13 PM EDT -----  Regarding: /Telephone   Level 1/Escalated Issue      Caller's first and last name and relationship (if not the patient):Pt       Best contact number(s):[phone]    What are the symptoms: Nubia inhaler causing allergic reaction (itching and rash on back)      Transfer successful - yes/no (include outcome):Yes,attempt to transfer       Transfer declined - yes/no (include reason):N/a      Was caller advised to seek appropriate level of care - yes/no:Yes      Details to clarify the request:        Lul Bobby

## 2019-10-08 RX ORDER — CLOPIDOGREL BISULFATE 75 MG/1
75 TABLET ORAL DAILY
Qty: 90 TAB | Refills: 1 | Status: SHIPPED | OUTPATIENT
Start: 2019-10-08 | End: 2020-04-21

## 2019-10-08 RX ORDER — METOCLOPRAMIDE 5 MG/1
TABLET ORAL
Qty: 90 TAB | Refills: 1 | Status: SHIPPED | OUTPATIENT
Start: 2019-10-08 | End: 2020-03-22

## 2019-10-09 ENCOUNTER — OFFICE VISIT (OUTPATIENT)
Dept: FAMILY MEDICINE CLINIC | Age: 49
End: 2019-10-09

## 2019-10-09 VITALS
OXYGEN SATURATION: 92 % | SYSTOLIC BLOOD PRESSURE: 133 MMHG | BODY MASS INDEX: 25.68 KG/M2 | HEART RATE: 92 BPM | HEIGHT: 64 IN | DIASTOLIC BLOOD PRESSURE: 77 MMHG | RESPIRATION RATE: 18 BRPM | WEIGHT: 150.4 LBS | TEMPERATURE: 98.7 F

## 2019-10-09 DIAGNOSIS — E61.1 IRON DEFICIENCY: ICD-10-CM

## 2019-10-09 DIAGNOSIS — J44.9 CHRONIC OBSTRUCTIVE PULMONARY DISEASE, UNSPECIFIED COPD TYPE (HCC): ICD-10-CM

## 2019-10-09 DIAGNOSIS — R21 RASH AND NONSPECIFIC SKIN ERUPTION: ICD-10-CM

## 2019-10-09 DIAGNOSIS — G25.81 RLS (RESTLESS LEGS SYNDROME): ICD-10-CM

## 2019-10-09 DIAGNOSIS — Z72.0 TOBACCO ABUSE: ICD-10-CM

## 2019-10-09 DIAGNOSIS — F41.9 ANXIETY: ICD-10-CM

## 2019-10-09 DIAGNOSIS — G25.81 RLS (RESTLESS LEGS SYNDROME): Primary | ICD-10-CM

## 2019-10-09 RX ORDER — METHYLPREDNISOLONE 4 MG/1
TABLET ORAL
Qty: 1 DOSE PACK | Refills: 0 | Status: SHIPPED | OUTPATIENT
Start: 2019-10-09 | End: 2020-02-26 | Stop reason: ALTCHOICE

## 2019-10-09 RX ORDER — ROPINIROLE 0.5 MG/1
0.5 TABLET, FILM COATED ORAL 3 TIMES DAILY
Qty: 90 TAB | Refills: 0 | Status: SHIPPED | OUTPATIENT
Start: 2019-10-09 | End: 2019-10-09 | Stop reason: SDUPTHER

## 2019-10-09 NOTE — PROGRESS NOTES
Chief Complaint   Patient presents with    Rash     back - it started after patient started doing breathing treatment (trelegy ellipta)- red/itching x 1 month       1. Have you been to the ER, urgent care clinic since your last visit? Hospitalized since your last visit? No    2. Have you seen or consulted any other health care providers outside of the [address] [place] [address] since your last visit? Include any pap smears or colon screening.  No      Patient stated that she will schedule a pap and mammogram.
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
## 2019-10-10 NOTE — TELEPHONE ENCOUNTER
----- Message from Yani Radha sent at 10/10/2019  5:01 PM EDT -----  Regarding: Jorge Alberto Solorzano MD/Telephone   General Message/Vendor Calls    Caller's first and last name:  Marilyn Latif     Reason for call: Pt questioned if the practice received a fax from her [employer] regarding a refill request for Xanax. If so, please contact her to confirm.         Callback required yes/no and why: Yes        Best contact number(s): [phone]   [facility]: [phone]     Details to clarify the request:  N/A

## 2019-10-11 RX ORDER — ALPRAZOLAM 0.5 MG/1
TABLET ORAL
Qty: 60 TAB | Refills: 0 | Status: SHIPPED | OUTPATIENT
Start: 2019-10-11 | End: 2019-11-11 | Stop reason: SDUPTHER

## 2019-10-11 RX ORDER — ROPINIROLE 0.5 MG/1
TABLET, FILM COATED ORAL
Qty: 270 TAB | Refills: 0 | Status: SHIPPED | OUTPATIENT
Start: 2019-10-11 | End: 2020-06-18

## 2019-10-11 NOTE — TELEPHONE ENCOUNTER
LOV 10/9/2019    The Prescription Monitoring Program has been reviewed for recent activity regarding controlled substances for this patient.      Per  last refill 9/12/2019 #60

## 2019-10-11 NOTE — TELEPHONE ENCOUNTER
[phone] Jasbir called in prescription for patient xanax via     Requested Prescriptions     Signed Prescriptions Disp Refills    rOPINIRole (REQUIP) 0.5 mg tablet 270 Tab 0     Sig: TAKE 1 TABLET BY MOUTH THREE TIMES DAILY     Authorizing Provider: Abdon Kelley    ALPRAZolam (XANAX) 0.5 mg tablet 60 Tab 0     Sig: TAKE 1 TABLET BY MOUTH TWICE DAILY AS NEEDED FOR ANXIETY     Authorizing Provider: Abdon Kelley

## 2019-10-14 ENCOUNTER — TELEPHONE (OUTPATIENT)
Dept: FAMILY MEDICINE CLINIC | Age: 49
End: 2019-10-14

## 2019-10-14 NOTE — TELEPHONE ENCOUNTER
[phone] spoke to [facility] notified of Dr Nellie Ochoa recommendation and understand     Requested Prescriptions     Signed Prescriptions Disp Refills    tiotropium (SPIRIVA WITH HANDIHALER) 18 mcg inhalation capsule 30 Cap 5     Sig: Take 1 Cap by inhalation daily.      Authorizing Provider: Dedrick Galvez     Ordering User: Nikolay Jang     Per Dr Nellie Ochoa ok to send [id number]

## 2019-10-14 NOTE — TELEPHONE ENCOUNTER
----- Message from Susan Morales sent at 10/14/2019 10:15 AM EDT -----  Regarding: Dr. Zara Winston  Contact: [phone]  Pt advises that her new inhaler is causing an allergic reaction and she has stopped using it. She would like to discuss this as soon as possible. Pt says if she does not answer to please leave a message.

## 2019-10-28 DIAGNOSIS — R05.9 COUGH: Primary | ICD-10-CM

## 2019-10-28 DIAGNOSIS — E78.00 HYPERCHOLESTEROLEMIA: ICD-10-CM

## 2019-10-30 RX ORDER — SIMVASTATIN 40 MG/1
TABLET, FILM COATED ORAL
Qty: 90 TAB | Refills: 1 | Status: SHIPPED | OUTPATIENT
Start: 2019-10-30 | End: 2020-04-24

## 2019-10-30 NOTE — TELEPHONE ENCOUNTER
----- Message from Janneth Hou sent at 10/30/2019 11:59 AM EDT -----  Regarding: [place]/telephone  Pt is requesting a Rx for coughing and congestion started 2 weeks ago again. Pts number is [phone] and Jacques Claudio number is [phone].

## 2019-10-31 RX ORDER — GUAIFENESIN AND DEXTROMETHORPHAN HYDROBROMIDE 1200; 60 MG/1; MG/1
TABLET, EXTENDED RELEASE ORAL
COMMUNITY
End: 2019-10-31 | Stop reason: SDUPTHER

## 2019-10-31 RX ORDER — GUAIFENESIN AND DEXTROMETHORPHAN HYDROBROMIDE 1200; 60 MG/1; MG/1
TABLET, EXTENDED RELEASE ORAL
Qty: 20 TAB | Refills: 0 | Status: SHIPPED | OUTPATIENT
Start: 2019-10-31 | End: 2020-08-20

## 2019-10-31 NOTE — TELEPHONE ENCOUNTER
Per Dr Gorge Murphy mucinex Dm 1 tab po bid x 10 days     Per Dr Gorge Murphy ok to send Mucinex DM to pharmacy      dextromethorphan-guaiFENesin [facility] DM) 60-1,200 mg Tb12 20 Tab 0     Sig: Take 1 tablet by mouth twice daily for 10 days     Authorizing Provider: Gabe Lopez     Ordering User: [id number] [facility] notified patient prescription was sent to pharmacy patient understand

## 2019-11-06 ENCOUNTER — OFFICE VISIT (OUTPATIENT)
Dept: FAMILY MEDICINE CLINIC | Age: 49
End: 2019-11-06

## 2019-11-06 VITALS
TEMPERATURE: 98.2 F | DIASTOLIC BLOOD PRESSURE: 75 MMHG | SYSTOLIC BLOOD PRESSURE: 121 MMHG | HEIGHT: 64 IN | OXYGEN SATURATION: 91 % | RESPIRATION RATE: 19 BRPM | HEART RATE: 96 BPM | WEIGHT: 153.8 LBS | BODY MASS INDEX: 26.26 KG/M2

## 2019-11-06 DIAGNOSIS — G25.81 RLS (RESTLESS LEGS SYNDROME): ICD-10-CM

## 2019-11-06 DIAGNOSIS — E61.1 IRON DEFICIENCY: ICD-10-CM

## 2019-11-06 DIAGNOSIS — K22.2 ESOPHAGEAL STRICTURE: ICD-10-CM

## 2019-11-06 DIAGNOSIS — J44.9 CHRONIC OBSTRUCTIVE PULMONARY DISEASE, UNSPECIFIED COPD TYPE (HCC): ICD-10-CM

## 2019-11-06 DIAGNOSIS — R60.9 EDEMA, UNSPECIFIED TYPE: ICD-10-CM

## 2019-11-06 DIAGNOSIS — C34.11 MALIGNANT NEOPLASM OF UPPER LOBE OF RIGHT LUNG (HCC): ICD-10-CM

## 2019-11-06 DIAGNOSIS — R06.02 SOB (SHORTNESS OF BREATH): ICD-10-CM

## 2019-11-06 DIAGNOSIS — R07.9 CHEST PAIN, UNSPECIFIED TYPE: Primary | ICD-10-CM

## 2019-11-06 RX ORDER — NITROGLYCERIN 0.4 MG/1
0.4 TABLET SUBLINGUAL
COMMUNITY

## 2019-11-06 RX ORDER — BUMETANIDE 0.5 MG/1
0.5 TABLET ORAL DAILY
Qty: 30 TAB | Refills: 1 | Status: SHIPPED | OUTPATIENT
Start: 2019-11-06 | End: 2019-11-06 | Stop reason: SDUPTHER
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
## 2019-11-06 NOTE — PROGRESS NOTES
Chief Complaint   Patient presents with    COPD     follow up reports increased SOB     Restless Leg Syndrome     follow up     Ankle swelling     right x2 days        1. Have you been to the ER, urgent care clinic since your last visit? Hospitalized since your last visit? No    2. Have you seen or consulted any other health care providers outside of the [address] [place] [address] since your last visit? Include any pap smears or colon screening.  No

## 2019-11-07 RX ORDER — BUMETANIDE 0.5 MG/1
TABLET ORAL
Qty: 90 TAB | Refills: 0 | Status: SHIPPED | OUTPATIENT
Start: 2019-11-07 | End: 2020-08-20

## 2019-11-11 DIAGNOSIS — F41.9 ANXIETY: ICD-10-CM

## 2019-11-12 RX ORDER — ALPRAZOLAM 0.5 MG/1
TABLET ORAL
Qty: 60 TAB | Refills: 0 | Status: SHIPPED | OUTPATIENT
Start: 2019-11-12 | End: 2019-12-12 | Stop reason: SDUPTHER

## 2019-11-16 DIAGNOSIS — E78.00 HYPERCHOLESTEROLEMIA: ICD-10-CM

## 2019-11-16 DIAGNOSIS — G25.81 RLS (RESTLESS LEGS SYNDROME): ICD-10-CM

## 2019-11-16 DIAGNOSIS — M25.50 ARTHRALGIA, UNSPECIFIED JOINT: ICD-10-CM

## 2019-11-18 ENCOUNTER — HOSPITAL ENCOUNTER (OUTPATIENT)
Dept: NON INVASIVE DIAGNOSTICS | Age: 49
Discharge: HOME | End: 2019-11-18
Attending: FAMILY MEDICINE
Payer: MEDICARE

## 2019-11-18 ENCOUNTER — HOSPITAL ENCOUNTER (OUTPATIENT)
Dept: VASCULAR SURGERY | Age: 49
Discharge: HOME | End: 2019-11-18
Attending: FAMILY MEDICINE
Payer: MEDICARE

## 2019-11-18 DIAGNOSIS — R60.9 EDEMA, UNSPECIFIED TYPE: ICD-10-CM

## 2019-11-18 DIAGNOSIS — R06.02 SOB (SHORTNESS OF BREATH): ICD-10-CM

## 2019-11-18 PROCEDURE — 93306 TTE W/DOPPLER COMPLETE: CPT

## 2019-11-18 PROCEDURE — 93970 EXTREMITY STUDY: CPT

## 2019-11-18 RX ORDER — DICLOFENAC SODIUM 75 MG/1
TABLET, DELAYED RELEASE ORAL
Qty: 30 TAB | Refills: 0 | Status: SHIPPED | OUTPATIENT
Start: 2019-11-18 | End: 2020-04-21

## 2019-11-18 RX ORDER — SIMVASTATIN 40 MG/1
TABLET, FILM COATED ORAL
Qty: 30 TAB | Refills: 0 | Status: SHIPPED | OUTPATIENT
Start: 2019-11-18 | End: 2020-02-26 | Stop reason: SDUPTHER

## 2019-11-18 RX ORDER — FAMOTIDINE 40 MG/1
TABLET, FILM COATED ORAL
Qty: 90 TAB | Refills: 0 | Status: SHIPPED | OUTPATIENT
Start: 2019-11-18 | End: 2020-02-23

## 2019-11-18 RX ORDER — ESCITALOPRAM OXALATE 20 MG/1
TABLET ORAL
Qty: 90 TAB | Refills: 0 | Status: SHIPPED | OUTPATIENT
Start: 2019-11-18 | End: 2020-06-18

## 2019-11-18 RX ORDER — ROPINIROLE 0.5 MG/1
TABLET, FILM COATED ORAL
Qty: 90 TAB | Refills: 0 | Status: SHIPPED | OUTPATIENT
Start: 2019-11-18 | End: 2019-12-24

## 2019-11-18 RX ORDER — DICLOFENAC SODIUM 75 MG/1
TABLET, DELAYED RELEASE ORAL
Qty: 30 TAB | Refills: 0 | Status: SHIPPED | OUTPATIENT
Start: 2019-11-18 | End: 2020-02-26 | Stop reason: SDUPTHER

## 2019-11-19 LAB
AV PEAK GRADIENT: 82.73 MMHG
AV VELOCITY RATIO: 0.72
ECHO AO ROOT DIAM: 3.19 CM
ECHO AV AREA PEAK VELOCITY: 2.1 CM2
ECHO AV CUSP MM: 1.89 CM
ECHO AV PEAK GRADIENT: 8 MMHG
ECHO AV PEAK VELOCITY: 141.43 CM/S
ECHO AV REGURGITANT PHT: 626.1 CM
ECHO LA AREA 4C: 7.5 CM2
ECHO LA MAJOR AXIS: 3.53 CM
ECHO LA TO AORTIC ROOT RATIO: 1.11
ECHO LA VOL 2C: 25.07 ML (ref 22–52)
ECHO LA VOL 4C: 10.69 ML (ref 22–52)
ECHO LA VOL BP: 17.39 ML (ref 22–52)
ECHO LV E' LATERAL VELOCITY: 8.01 CM/S
ECHO LV E' SEPTAL VELOCITY: 7.05 CM/S
ECHO LV INTERNAL DIMENSION DIASTOLIC: 4.83 CM (ref 3.9–5.3)
ECHO LV INTERNAL DIMENSION SYSTOLIC: 4.15 CM
ECHO LV IVSD: 1.17 CM (ref 0.6–0.9)
ECHO LV MASS 2D: 228 G (ref 67–162)
ECHO LV POSTERIOR WALL DIASTOLIC: 1.01 CM (ref 0.6–0.9)
ECHO LVOT DIAM: 1.92 CM
ECHO LVOT PEAK GRADIENT: 4.1 MMHG
ECHO LVOT PEAK VELOCITY: 101.85 CM/S
ECHO MV E VELOCITY: 87.1 CM/S
ECHO MV E/E' LATERAL: 10.87
ECHO MV E/E' RATIO (AVERAGED): 11.61
ECHO MV E/E' SEPTAL: 12.35
ECHO PV MAX VELOCITY: 75.47 CM/S
ECHO PV PEAK GRADIENT: 2.3 MMHG
ECHO RV INTERNAL DIMENSION: 3.71 CM
ECHO RV TAPSE: 1.16 CM (ref 1.5–2)
ECHO TV REGURGITANT MAX VELOCITY: 393.04 CM/S
ECHO TV REGURGITANT PEAK GRADIENT: 61.8 MMHG
LVFS 2D: 14.05 %
PISA AR MAX VEL: 454.78 CM/S
PV END DIASTOLIC VELOCITY: 1.9 MMHG

## 2019-11-19 NOTE — PROGRESS NOTES
Result Text    · No evidence of right or left lower extremity vein thrombosis. · Venous flow is pulsatile, consistent with increased central venous pressure.

## 2019-11-19 NOTE — PROGRESS NOTES
[phone] Spoke to patient Identified pt with two pt identifiers (Name @ )  Notified patient of her results and understand

## 2019-11-22 NOTE — PROGRESS NOTES
EF is low normal    I would advice to see cardiology  Dr. Cali Kebede    · Left Ventricle: Normal cavity size. Mild concentric hypertrophy. Low normal systolic dysfunction. Estimated left ventricular ejection fraction is 51 - 55%. No regional wall motion abnormality noted. Inconclusive left ventricular diastolic function. · Right Ventricle: Mildly dilated right ventricle. Mildly reduced systolic function. · Right Atrium: Moderately dilated right atrium. · Tricuspid Valve: Mild tricuspid valve regurgitation is present. · IVC/Hepatic Veins: Moderately elevated central venous pressure (10-15 mmHg); IVC diameter is larger than 21 mm and collapses more than 50% with respiration. · Pulmonary Artery: Moderate to severe pulmonary hypertension. · Mitral Valve: Mitral valve non-specific thickening. Trace mitral valve regurgitation is present. · Aortic Valve: Mild aortic valve sclerosis. Mild aortic valve regurgitation is present.

## 2019-11-26 ENCOUNTER — TELEPHONE (OUTPATIENT)
Dept: FAMILY MEDICINE CLINIC | Age: 49
End: 2019-11-26

## 2019-11-26 NOTE — TELEPHONE ENCOUNTER
[phone] per patient already saw results in my chart and already saw Dr Zhanna Grove (cardiologist) and aware and understand her echo results

## 2019-11-26 NOTE — PROGRESS NOTES
[phone] attempted to call patient no answer no    [phone] attempted to call but number has bee disconnected   Will try again later

## 2019-11-26 NOTE — TELEPHONE ENCOUNTER
----- Message from Jose Vu sent at 11/26/2019 12:36 PM EST -----  Regarding: Dr. Jossy Marsh / telephone  Patient return call    Caller's first and last name and relationship (if not the patient):      Best contact number(s): [phone]      Whose call is being returned: unknown      Details to clarify the request: Pt returning call and requested a call back      Jose Vu

## 2019-12-12 DIAGNOSIS — F41.9 ANXIETY: ICD-10-CM

## 2019-12-12 RX ORDER — ALPRAZOLAM 0.5 MG/1
TABLET ORAL
Qty: 60 TAB | Refills: 0 | Status: SHIPPED | OUTPATIENT
Start: 2019-12-12 | End: 2020-01-21

## 2019-12-24 DIAGNOSIS — F41.9 ANXIETY: ICD-10-CM

## 2019-12-24 DIAGNOSIS — G25.81 RLS (RESTLESS LEGS SYNDROME): ICD-10-CM

## 2019-12-24 RX ORDER — ROPINIROLE 0.5 MG/1
TABLET, FILM COATED ORAL
Qty: 90 TAB | Refills: 0 | Status: SHIPPED | OUTPATIENT
Start: 2019-12-24 | End: 2019-12-26

## 2019-12-24 RX ORDER — ESCITALOPRAM OXALATE 20 MG/1
TABLET ORAL
Qty: 90 TAB | Refills: 1 | Status: SHIPPED | OUTPATIENT
Start: 2019-12-24 | End: 2020-02-26 | Stop reason: SDUPTHER

## 2019-12-26 DIAGNOSIS — G25.81 RLS (RESTLESS LEGS SYNDROME): ICD-10-CM

## 2019-12-26 RX ORDER — ROPINIROLE 0.5 MG/1
TABLET, FILM COATED ORAL
Qty: 270 TAB | Refills: 1 | Status: SHIPPED | OUTPATIENT
Start: 2019-12-26 | End: 2020-01-27

## 2020-01-07 ENCOUNTER — OFFICE VISIT (OUTPATIENT)
Dept: FAMILY MEDICINE CLINIC | Age: 50
End: 2020-01-07

## 2020-01-07 ENCOUNTER — TELEPHONE (OUTPATIENT)
Dept: FAMILY MEDICINE CLINIC | Age: 50
End: 2020-01-07

## 2020-01-07 VITALS
BODY MASS INDEX: 22.02 KG/M2 | RESPIRATION RATE: 20 BRPM | SYSTOLIC BLOOD PRESSURE: 101 MMHG | WEIGHT: 129 LBS | TEMPERATURE: 98.4 F | HEIGHT: 64 IN | HEART RATE: 95 BPM | OXYGEN SATURATION: 80 % | DIASTOLIC BLOOD PRESSURE: 67 MMHG

## 2020-01-07 DIAGNOSIS — R53.83 OTHER FATIGUE: ICD-10-CM

## 2020-01-07 DIAGNOSIS — E61.1 IRON DEFICIENCY: ICD-10-CM

## 2020-01-07 DIAGNOSIS — I25.83 CORONARY ARTERY DISEASE DUE TO LIPID RICH PLAQUE: ICD-10-CM

## 2020-01-07 DIAGNOSIS — C34.11 MALIGNANT NEOPLASM OF UPPER LOBE OF RIGHT LUNG (HCC): ICD-10-CM

## 2020-01-07 DIAGNOSIS — I25.10 CORONARY ARTERY DISEASE DUE TO LIPID RICH PLAQUE: ICD-10-CM

## 2020-01-07 DIAGNOSIS — R09.02 HYPOXEMIA: Primary | ICD-10-CM

## 2020-01-07 NOTE — PROGRESS NOTES
[facility] Note    Tony Beltre is a 44 y.o. female who was seen in clinic today (1/8/2020). Subjective:  Fatigue  Patient complains of fatigue. Symptoms began several weeks ago. Sentinal symptom the patient feels fatigue began with: significant change in weight, true exercise intolerance. Symptoms of her fatigue have been general malaise, peripheral edema. Patient describes the following psychologic symptoms: depression: moderate. Patient denies GI blood loss. The course has been gradually worsening. Severity has been struggles to carry out day to day responsibilities. Patient has h/o malignant neoplasm of right lung and CAD. Patient taking iron supplement BID x 2 weeks. Prior to Admission medications    Medication Sig Start Date End Date Taking?  Authorizing Provider   rOPINIRole (REQUIP) 0.5 mg tablet TAKE 1 TABLET BY MOUTH THREE TIMES DAILY 12/26/19   Allan Carballo MD   escitalopram oxalate (LEXAPRO) 20 mg tablet TAKE 1 TABLET BY MOUTH EVERY DAY AS NEEDED FOR ANXIETY 12/24/19   Allan Carballo MD   ALPRAZolam (XANAX) 0.5 mg tablet TAKE 1 TABLET BY MOUTH TWICE DAILY AS NEEDED FOR ANXIETY 12/12/19   Allan Carballo MD   famotidine (PEPCID) 40 mg tablet TAKE 1 TABLET BY MOUTH EVERY DAY 11/18/19   Allan Carballo MD   escitalopram oxalate (LEXAPRO) 20 mg tablet TAKE 1 TABLET BY MOUTH EVERY DAY AS NEEDED FOR ANXIETY 11/18/19   Allan Carballo MD   simvastatin (ZOCOR) 40 mg tablet TAKE 1 TABLET BY MOUTH EVERY NIGHT 11/18/19   Allan Carballo MD   diclofenac EC (VOLTAREN) 75 mg EC tablet TAKE 1 TABLET BY MOUTH DAILY 11/18/19   Allan Carballo MD   diclofenac EC (VOLTAREN) 75 mg EC tablet TAKE 1 TABLET BY MOUTH DAILY 11/18/19   Allan Carballo MD   bumetanide (BUMEX) 0.5 mg tablet TAKE 1 TABLET BY MOUTH EVERY DAY FOR VISIBLE WATER RETENTION 11/7/19   Allan Carballo MD   nitroglycerin (NITROSTAT) 0.4 mg SL tablet 0.4 mg.    Provider, Historical dextromethorphan-guaiFENesin (MUCINEX DM) 60-1,200 mg Tb12 Take 1 tablet by mouth twice daily for 10 days 10/31/19   Tavia Kam MD   simvastatin (ZOCOR) 40 mg tablet TAKE 1 TABLET BY MOUTH EVERY EVERY NIGHT 10/30/19   Juliane Carballo MD   tiotropium (SPIRIVA WITH HANDIHALER) 18 mcg inhalation capsule Take 1 Cap by inhalation daily. 10/14/19   Juliane Carballo MD   rOPINIRole (REQUIP) 0.5 mg tablet TAKE 1 TABLET BY MOUTH THREE TIMES DAILY 10/11/19   Juliane Carballo MD   methylPREDNISolone (MEDROL DOSEPACK) 4 mg tablet Take as directed 10/9/19   Tavia Kam MD   metoclopramide HCl (REGLAN) 5 mg tablet TAKE 1 TABLET BY MOUTH THREE TIMES DAILY BEFORE MEALS 10/8/19   Juliane Carballo MD   clopidogrel (PLAVIX) 75 mg tab Take 1 Tab by mouth daily. 10/8/19   Juliane Carballo MD   ferrous sulfate 325 mg (65 mg iron) tablet Take 1 Tab by mouth two (2) times a day. 8/23/19   Juliane Carballo MD   albuterol (PROVENTIL HFA, VENTOLIN HFA, PROAIR HFA) 90 mcg/actuation inhaler Take 2 Puffs by inhalation every six (6) hours as needed for Wheezing. 3/27/19   Juliane Carballo MD   omeprazole (PRILOSEC) 20 mg capsule Take 1 Cap by mouth daily. Patient taking differently: Take 20 mg by mouth daily. Indications: take 2 20mg daily 6/27/13   Juliane Carballo MD          Allergies   Allergen Reactions    Lemon Oil Nausea and Vomiting    Amoxil [Amoxicillin] Diarrhea    Anoro Ellipta [Umeclidinium-Vilanterol] Rash    Codeine Nausea and Vomiting    Fluticasone-Umeclidin-Vilanter Rash    Metoprolol Nausea and Vomiting           ROS  See HPI    Objective:   Physical Exam  Vitals signs and nursing note reviewed. Constitutional:       Appearance: She is well-developed. Neck:      Musculoskeletal: Normal range of motion and neck supple. Thyroid: No thyromegaly. Vascular: No carotid bruit or JVD. Cardiovascular:      Rate and Rhythm: Normal rate and regular rhythm. Heart sounds: No murmur. No friction rub. No gallop. Pulmonary:      Effort: Pulmonary effort is normal. No respiratory distress. Breath sounds: Decreased breath sounds present. Lymphadenopathy:      Cervical: No cervical adenopathy. Neurological:      Mental Status: She is alert and oriented to person, place, and time. Psychiatric:         Behavior: Behavior normal.         Visit Vitals  /67 (BP 1 Location: Left arm, BP Patient Position: Sitting)   Pulse 95   Temp 98.4 °F (36.9 °C)   Resp 20   Ht 5' 4\" (1.626 m)   Wt 129 lb (58.5 kg)   SpO2 (!) 80%   BMI 22.14 kg/m²       Assessment & Plan:  Diagnoses and all orders for this visit:    1. Hypoxemia  Discussed risk and severity of low SpO2. I have advised patient go to ED for further evaluation. EMS transfer deferred. Patient verbalizes understanding. 2. Other fatigue  Likely s/t #1. Go to ER as above. 3. Iron deficiency  Continue iron supplement. Follow up with heme-onc. 4. Coronary artery disease due to lipid rich plaque  Follow up with cardiology as scheduled. 5. Malignant neoplasm of upper lobe of right lung ([address]  Follow up with pulmonology and heme-onc as scheduled. I have discussed the diagnosis with the patient and the intended plan as seen in the above orders. The patient has received an after-visit summary along with patient information handout. I have discussed medication side effects and warnings with the patient as well. Follow-up and Dispositions    · Return if symptoms worsen or fail to improve.            Roxi White NP

## 2020-01-07 NOTE — PATIENT INSTRUCTIONS
Learning About Hypoxemia  What is hypoxemia? Hypoxemia means that you don't have enough oxygen in your blood. It's a result of diseases that affect your heart or lungs. These include heart failure, COPD, and pulmonary fibrosis (scarring of the lungs). Being at high altitudes can also lead to hypoxemia. What happens when you have hypoxemia? Oxygen gets into your blood through your lungs. Your blood carries the oxygen to all parts of your body. When you have too little oxygen in your blood, your body doesn't get enough of it. With too little oxygen, your heart and other parts of your body don't work very well. What are the symptoms? In addition to the symptoms of whatever is causing your hypoxemia, you may:  · Get tired quickly. · Be short of breath when you are active. · Feel like your heart is pounding or racing. · Feel weak or dizzy. · Become confused. How is hypoxemia treated? Your doctor will do tests to find out how much oxygen is in your blood. He or she will look for the cause of your hypoxemia and treat that problem. For example, if you have heart failure, you may need medicines that help your heart pump better. · If your hypoxemia is not severe, your doctor may give you oxygen through a mask or nasal cannula (say \"GAUDENCIO-austinh-abhijeet\"). A cannula is a thin tube with two openings that fit just inside your nose. · If your hypoxemia is severe, you may have a breathing tube put into your windpipe. The breathing tube is attached to a machine that pushes air into your lungs. This machine is called a ventilator. · If you have a long-term problem with hypoxemia, your doctor may recommend that you use oxygen regularly. Some people need it all the time. Others need it from time to time throughout the day or overnight. Your doctor will tell you how much oxygen you need and how often to use it. Follow-up care is a key part of your treatment and safety.  Be sure to make and go to all appointments, and call your doctor if you are having problems. It's also a good idea to know your test results and keep a list of the medicines you take. Where can you learn more? Go to http://yuliya-bryan.info/. Enter M375 in the search box to learn more about \"Learning About Hypoxemia. \"  Current as of: June 9, 2019  Content Version: 12.2  © 8946-2137 [employer]. Care instructions adapted under license by [employer] (which disclaims liability or warranty for this information). If you have questions about a medical condition or this instruction, always ask your healthcare professional. [place] [address] any warranty or liability for your use of this information.

## 2020-01-17 DIAGNOSIS — F41.9 ANXIETY: ICD-10-CM

## 2020-01-17 NOTE — TELEPHONE ENCOUNTER
----- Message from Albertarosalind Elias sent at 1/17/2020  2:40 PM EST -----  Regarding: Dr. Surya Alvarez  Medication Refill    Caller (if not patient): n/a  Relationship of caller (if not patient): n/a  Best contact number(s): [phone] or [phone]    Name of medication and dosage if known: Xanax 0.5 mg     Is patient out of this medication (yes/no): Yes    Pharmacy name: [facility] listed in chart? (yes/no): Yes  Pharmacy phone number: [phone]    Date of last visit:01/07/2020    Details to clarify the request: Pt just out the hospitalized for 10 days for respiratory failure, the Xanax is used to help her stop smoking. Pt needs a refill.

## 2020-01-20 ENCOUNTER — TELEPHONE (OUTPATIENT)
Dept: FAMILY MEDICINE CLINIC | Age: 50
End: 2020-01-20

## 2020-01-20 NOTE — TELEPHONE ENCOUNTER
----- Message from Jayda Dominguez sent at 1/20/2020  4:06 PM EST -----  Regarding: Dr. Richard Wright telephone  General Message/Vendor Calls    Caller's first and last name:      Reason for call:Pt would like to speak with the nurse     Callback required yes/no and why: yes       Best contact number(s):[phone]      Details to clarify the request:      Jayda Dominguez

## 2020-01-20 NOTE — TELEPHONE ENCOUNTER
----- Message from Vinicius Coleman sent at 1/20/2020  9:53 AM EST -----  Regarding: Dr. Rhiannon Ramirez  Contact: [phone]  Caller's first and last name: N/A  Reason for call: Patient was released from [address] 1/17/20 for respiratory failure and needs Xanax prescription filled  Callback required yes/no and why: yes, to get update on medication request that should have been faxed to [address] [place] [address] on file   Best contact number(s): [phone]  Details to clarify the request: Patient says her sister called 1/17/20 to have it filled but has not received any updates.

## 2020-01-21 RX ORDER — ALPRAZOLAM 0.5 MG/1
TABLET ORAL
Qty: 60 TAB | Refills: 0 | Status: SHIPPED | OUTPATIENT
Start: 2020-01-21 | End: 2020-02-20

## 2020-01-21 NOTE — TELEPHONE ENCOUNTER
Patient called and she is still waiting on okay for refill. Anxiety worse since in  Hospital and asking for refill. I made her apt for 01/27/20.

## 2020-01-21 NOTE — TELEPHONE ENCOUNTER
Called patient back and she is still waiting on refill for Xanax . Med refill encounter from 01/17/20. Last f/u apt with you 11/06/20. I informed her that she needs to make BOB apt from [address] [place], [place] [address] and f/u anxiety. She states has been worse since when she was hospital and now, only sleeping 3-4 hrs q night. I set up apt for her with you on 01/27/20 but she is out of her Xanax and asking if can be filled before her apt. Already med refill pending.

## 2020-01-27 ENCOUNTER — OFFICE VISIT (OUTPATIENT)
Dept: FAMILY MEDICINE CLINIC | Age: 50
End: 2020-01-27

## 2020-01-27 VITALS
WEIGHT: 136.4 LBS | BODY MASS INDEX: 23.29 KG/M2 | SYSTOLIC BLOOD PRESSURE: 118 MMHG | DIASTOLIC BLOOD PRESSURE: 74 MMHG | OXYGEN SATURATION: 97 % | HEIGHT: 64 IN | TEMPERATURE: 98.5 F | HEART RATE: 80 BPM | RESPIRATION RATE: 17 BRPM

## 2020-01-27 DIAGNOSIS — R53.83 MALAISE AND FATIGUE: ICD-10-CM

## 2020-01-27 DIAGNOSIS — Z12.11 COLON CANCER SCREENING: ICD-10-CM

## 2020-01-27 DIAGNOSIS — D36.9 ADENOMA: ICD-10-CM

## 2020-01-27 DIAGNOSIS — D50.9 IRON DEFICIENCY ANEMIA, UNSPECIFIED IRON DEFICIENCY ANEMIA TYPE: ICD-10-CM

## 2020-01-27 DIAGNOSIS — J44.9 CHRONIC OBSTRUCTIVE PULMONARY DISEASE, UNSPECIFIED COPD TYPE (HCC): ICD-10-CM

## 2020-01-27 DIAGNOSIS — I10 HTN (HYPERTENSION), BENIGN: ICD-10-CM

## 2020-01-27 DIAGNOSIS — G25.81 RLS (RESTLESS LEGS SYNDROME): Primary | ICD-10-CM

## 2020-01-27 DIAGNOSIS — R29.898 WEAKNESS OF BOTH LEGS: ICD-10-CM

## 2020-01-27 DIAGNOSIS — M79.10 MYALGIA: ICD-10-CM

## 2020-01-27 DIAGNOSIS — R53.81 MALAISE AND FATIGUE: ICD-10-CM

## 2020-01-27 DIAGNOSIS — E78.00 HYPERCHOLESTEROLEMIA: ICD-10-CM

## 2020-01-27 RX ORDER — PANTOPRAZOLE SODIUM 40 MG/1
40 TABLET, DELAYED RELEASE ORAL
COMMUNITY
Start: 2020-01-17 | End: 2020-02-28 | Stop reason: SDUPTHER

## 2020-01-27 RX ORDER — SUCRALFATE 1 G/10ML
1 SUSPENSION ORAL
COMMUNITY
Start: 2020-01-17 | End: 2021-05-25 | Stop reason: ALTCHOICE

## 2020-01-27 RX ORDER — POTASSIUM CHLORIDE 20 MEQ/1
40 TABLET, EXTENDED RELEASE ORAL
COMMUNITY
Start: 2020-01-17 | End: 2020-02-23

## 2020-01-27 RX ORDER — GABAPENTIN 300 MG/1
CAPSULE ORAL
Qty: 90 CAP | Refills: 0 | Status: SHIPPED | OUTPATIENT
Start: 2020-01-27 | End: 2020-05-20 | Stop reason: SDUPTHER

## 2020-01-27 RX ORDER — PREDNISONE 10 MG/1
10 TABLET ORAL
COMMUNITY
Start: 2020-01-17 | End: 2020-02-26 | Stop reason: ALTCHOICE

## 2020-01-27 RX ORDER — LINEZOLID 600 MG/1
600 TABLET, FILM COATED ORAL
COMMUNITY
Start: 2020-01-17 | End: 2020-08-20

## 2020-01-27 RX ORDER — GABAPENTIN 100 MG/1
100 CAPSULE ORAL
COMMUNITY
Start: 2020-01-17 | End: 2020-01-27 | Stop reason: SDUPTHER

## 2020-01-27 RX ORDER — LISINOPRIL 2.5 MG/1
2.5 TABLET ORAL
COMMUNITY
Start: 2020-01-17 | End: 2020-02-28 | Stop reason: SDUPTHER

## 2020-01-27 RX ORDER — GABAPENTIN 100 MG/1
CAPSULE ORAL
Qty: 90 CAP | Refills: 0 | Status: SHIPPED | OUTPATIENT
Start: 2020-01-27 | End: 2020-05-20 | Stop reason: SDUPTHER

## 2020-01-27 RX ORDER — NICOTINE 21 MG/24H
PATCH, EXTENDED RELEASE TRANSDERMAL
COMMUNITY
Start: 2020-01-17 | End: 2020-02-13 | Stop reason: SDUPTHER

## 2020-01-27 RX ORDER — CARVEDILOL 3.12 MG/1
TABLET ORAL
COMMUNITY
Start: 2020-01-17 | End: 2020-08-20

## 2020-01-27 RX ORDER — GABAPENTIN 300 MG/1
CAPSULE ORAL
COMMUNITY
Start: 2020-01-17 | End: 2020-01-27 | Stop reason: SDUPTHER

## 2020-01-27 NOTE — PATIENT INSTRUCTIONS
Body Mass Index: Care Instructions  Your Care Instructions    Body mass index (BMI) can help you see if your weight is raising your risk for health problems. It uses a formula to compare how much you weigh with how tall you are. · A BMI lower than 18.5 is considered underweight. · A BMI between 18.5 and 24.9 is considered healthy. · A BMI between 25 and 29.9 is considered overweight. A BMI of 30 or higher is considered obese. If your BMI is in the normal range, it means that you have a lower risk for weight-related health problems. If your BMI is in the overweight or obese range, you may be at increased risk for weight-related health problems, such as high blood pressure, heart disease, stroke, arthritis or joint pain, and diabetes. If your BMI is in the underweight range, you may be at increased risk for health problems such as fatigue, lower protection (immunity) against illness, muscle loss, bone loss, hair loss, and hormone problems. BMI is just one measure of your risk for weight-related health problems. You may be at higher risk for health problems if you are not active, you eat an unhealthy diet, or you drink too much alcohol or use tobacco products. Follow-up care is a key part of your treatment and safety. Be sure to make and go to all appointments, and call your doctor if you are having problems. It's also a good idea to know your test results and keep a list of the medicines you take. How can you care for yourself at home? · Practice healthy eating habits. This includes eating plenty of fruits, vegetables, whole grains, lean protein, and low-fat dairy. · If your doctor recommends it, get more exercise. Walking is a good choice. Bit by bit, increase the amount you walk every day. Try for at least 30 minutes on most days of the week. · Do not smoke. Smoking can increase your risk for health problems. If you need help quitting, talk to your doctor about stop-smoking programs and medicines. These can increase your chances of quitting for good. · Limit alcohol to 2 drinks a day for men and 1 drink a day for women. Too much alcohol can cause health problems. If you have a BMI higher than 25  · Your doctor may do other tests to check your risk for weight-related health problems. This may include measuring the distance around your waist. A waist measurement of more than 40 inches in men or 35 inches in women can increase the risk of weight-related health problems. · Talk with your doctor about steps you can take to stay healthy or improve your health. You may need to make lifestyle changes to lose weight and stay healthy, such as changing your diet and getting regular exercise. If you have a BMI lower than 18.5  · Your doctor may do other tests to check your risk for health problems. · Talk with your doctor about steps you can take to stay healthy or improve your health. You may need to make lifestyle changes to gain or maintain weight and stay healthy, such as getting more healthy foods in your diet and doing exercises to build muscle. Where can you learn more? Go to http://yuliya-bryan.info/. Enter S176 in the search box to learn more about \"Body Mass Index: Care Instructions. \"  Current as of: March 28, 2019  Content Version: 12.2  © 7906-2677 [employer]. Care instructions adapted under license by [employer] (which disclaims liability or warranty for this information). If you have questions about a medical condition or this instruction, always ask your healthcare professional. [address] any warranty or liability for your use of this information.

## 2020-01-27 NOTE — ASSESSMENT & PLAN NOTE
This condition is managed by Specialist.  Key COPD Medications             predniSONE (DELTASONE) 10 mg tablet (Taking) 10 mg.    tiotropium (SPIRIVA WITH HANDIHALER) 18 mcg inhalation capsule (Taking) Take 1 Cap by inhalation daily. methylPREDNISolone (MEDROL DOSEPACK) 4 mg tablet (Taking) Take as directed    albuterol (PROVENTIL HFA, VENTOLIN HFA, PROAIR HFA) 90 mcg/actuation inhaler (Taking) Take 2 Puffs by inhalation every six (6) hours as needed for Wheezing.         Lab Results   Component Value Date/Time    WBC 6.3 08/17/2019 08:57 AM    HGB 13.2 08/17/2019 08:57 AM    HCT 43.4 08/17/2019 08:57 AM    PLATELET 111 61/58/7123 08:57 AM

## 2020-01-27 NOTE — PROGRESS NOTES
Chief Complaint   Patient presents with   [facility] Follow Up     [facility] [place] [facility] 1/7/20 - 1/17/20      1. Have you been to the ER, urgent care clinic since your last visit? Hospitalized since your last visit? No    2. Have you seen or consulted any other health care providers outside of the [address] [place] [address] since your last visit? Include any pap smears or colon screening.  No
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
## 2020-01-27 NOTE — LETTER
1/27/2020 Ms. Jennifer Quispe [phone] To Whom It May Occur, Please excuse Ms. Sewell from 1/7/20-3/2/20 Due to medical reasons, please excuse her from work. Sincerely, Lucio Camacho MD

## 2020-02-13 NOTE — TELEPHONE ENCOUNTER
----- Message from Luz Maria Catalan sent at 2/13/2020  1:05 PM EST -----  Regarding: Dr. Alanna Cifuentes: [phone] (if not patient): N/A  Relationship of caller (if not patient): N/A  Best contact number(s): [phone]  Name of medication and dosage if known: Nicoderm Patch   Is patient out of this medication (yes/no): No  Pharmacy name: [address] [place] [address] listed in chart? (yes/no): yes  Pharmacy phone number: N/A  Date of last visit: 1/27/20  Details to clarify the request: N/A      .   Requested Prescriptions     Pending Prescriptions Disp Refills    NICODERM CQ 21 mg/24 hr       .Pharmacy on file verified

## 2020-02-17 RX ORDER — NICOTINE 21 MG/24H
PATCH, EXTENDED RELEASE TRANSDERMAL
Qty: 28 PATCH | Refills: 0 | Status: SHIPPED | OUTPATIENT
Start: 2020-02-17 | End: 2020-02-26 | Stop reason: DRUGHIGH

## 2020-02-17 NOTE — TELEPHONE ENCOUNTER
----- Message from 56Brice Ocasio sent at 2/15/2020  5:06 PM EST -----  Regarding: Dr. Appiah Estimable first and last name:  n/a  Reason for call:  Prescription inquiry   Callback required yes/no and why: Yes  Best contact number(s): [phone]  Details to clarify the request: Pt wants to know the status of her NicoDerm patches. Please give her a call when the prescription is ready.

## 2020-02-19 DIAGNOSIS — F41.9 ANXIETY: ICD-10-CM

## 2020-02-20 RX ORDER — ALPRAZOLAM 0.5 MG/1
TABLET ORAL
Qty: 60 TAB | Refills: 0 | Status: SHIPPED | OUTPATIENT
Start: 2020-02-20 | End: 2020-03-21

## 2020-02-23 RX ORDER — POTASSIUM CHLORIDE 20 MEQ/1
TABLET, EXTENDED RELEASE ORAL
Qty: 20 TAB | Refills: 5 | Status: SHIPPED | OUTPATIENT
Start: 2020-02-23 | End: 2020-02-25

## 2020-02-23 RX ORDER — LINEZOLID 600 MG/1
TABLET, FILM COATED ORAL
Qty: 14 TAB | OUTPATIENT
Start: 2020-02-23

## 2020-02-23 RX ORDER — FAMOTIDINE 40 MG/1
TABLET, FILM COATED ORAL
Qty: 90 TAB | Refills: 1 | Status: SHIPPED | OUTPATIENT
Start: 2020-02-23 | End: 2020-08-22

## 2020-02-24 RX ORDER — IBUPROFEN 200 MG
1 TABLET ORAL EVERY 24 HOURS
COMMUNITY
End: 2020-02-24 | Stop reason: SDUPTHER

## 2020-02-25 RX ORDER — POTASSIUM CHLORIDE 20 MEQ/1
TABLET, EXTENDED RELEASE ORAL
Qty: 180 TAB | Refills: 0 | Status: SHIPPED | OUTPATIENT
Start: 2020-02-25 | End: 2020-08-20

## 2020-02-25 RX ORDER — IBUPROFEN 200 MG
1 TABLET ORAL EVERY 24 HOURS
Qty: 30 PATCH | Refills: 4 | Status: SHIPPED | OUTPATIENT
Start: 2020-02-25 | End: 2020-08-20

## 2020-02-26 ENCOUNTER — OFFICE VISIT (OUTPATIENT)
Dept: FAMILY MEDICINE CLINIC | Age: 50
End: 2020-02-26

## 2020-02-26 VITALS
SYSTOLIC BLOOD PRESSURE: 84 MMHG | HEIGHT: 64 IN | WEIGHT: 140 LBS | HEART RATE: 95 BPM | BODY MASS INDEX: 23.9 KG/M2 | TEMPERATURE: 98.8 F | DIASTOLIC BLOOD PRESSURE: 57 MMHG | OXYGEN SATURATION: 97 % | RESPIRATION RATE: 18 BRPM

## 2020-02-26 DIAGNOSIS — R53.1 WEAKNESS GENERALIZED: ICD-10-CM

## 2020-02-26 DIAGNOSIS — R53.83 MALAISE AND FATIGUE: ICD-10-CM

## 2020-02-26 DIAGNOSIS — E61.1 IRON DEFICIENCY: ICD-10-CM

## 2020-02-26 DIAGNOSIS — E78.00 HYPERCHOLESTEROLEMIA: ICD-10-CM

## 2020-02-26 DIAGNOSIS — R42 DIZZINESS AFTER EXTENSION OF NECK: ICD-10-CM

## 2020-02-26 DIAGNOSIS — Z72.0 TOBACCO ABUSE: ICD-10-CM

## 2020-02-26 DIAGNOSIS — R53.81 MALAISE AND FATIGUE: ICD-10-CM

## 2020-02-26 DIAGNOSIS — J44.9 CHRONIC OBSTRUCTIVE PULMONARY DISEASE, UNSPECIFIED COPD TYPE (HCC): Primary | ICD-10-CM

## 2020-02-26 DIAGNOSIS — Z12.11 COLON CANCER SCREENING: ICD-10-CM

## 2020-02-26 DIAGNOSIS — R20.2 NUMBNESS AND TINGLING: ICD-10-CM

## 2020-02-26 DIAGNOSIS — M54.2 NECK PAIN: ICD-10-CM

## 2020-02-26 DIAGNOSIS — R20.0 NUMBNESS AND TINGLING: ICD-10-CM

## 2020-02-26 RX ORDER — IBUPROFEN 200 MG
1 TABLET ORAL EVERY 24 HOURS
Qty: 30 PATCH | Refills: 1 | Status: SHIPPED | OUTPATIENT
Start: 2020-02-26 | End: 2020-03-27

## 2020-02-26 NOTE — PROGRESS NOTES
Chief Complaint   Patient presents with    Restless Leg Syndrome    Follow-up     adenoma       Reviewed Record in preparation for visit and have obtained necessary documentation. Identified pt with two pt identifiers (Name @ )    Health Maintenance Due   Topic    FOBT Q1Y Age,18+     Shingrix Vaccine Age 50> (1 of 2)    Breast Cancer Screen Mammogram     PAP AKA CERVICAL CYTOLOGY          1. Have you been to the ER, urgent care clinic since your last visit? Hospitalized since your last visit? no    2. Have you seen or consulted any other health care providers outside of the [address] [place] [address] since your last visit? Include any pap smears or colon screening.  no

## 2020-02-26 NOTE — LETTER
NOTIFICATION RETURN TO WORK  
 
2/26/2020 8:21 AM 
 
Ms. Paula Quispe [phone] To Whom It May Concern: 
 
Paula Land is currently under the care of JENA Ray. She will return to work on: 3/11/20 If there are questions or concerns please have the patient contact our office. Sincerely, Charles Garduno MD
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
## 2020-02-27 LAB
ALBUMIN SERPL-MCNC: 4.2 G/DL (ref 3.8–4.9)
ALBUMIN/GLOB SERPL: 1.8 {RATIO} (ref 1.2–2.2)
ALP SERPL-CCNC: 84 IU/L (ref 39–117)
ALT SERPL-CCNC: 15 IU/L (ref 0–32)
AST SERPL-CCNC: 20 IU/L (ref 0–40)
BASOPHILS # BLD AUTO: 0 X10E3/UL (ref 0–0.2)
BASOPHILS NFR BLD AUTO: 1 %
BILIRUB SERPL-MCNC: 0.3 MG/DL (ref 0–1.2)
BUN SERPL-MCNC: 10 MG/DL (ref 6–24)
BUN/CREAT SERPL: 13 (ref 9–23)
CALCIUM SERPL-MCNC: 9.5 MG/DL (ref 8.7–10.2)
CHLORIDE SERPL-SCNC: 99 MMOL/L (ref 96–106)
CHOLEST SERPL-MCNC: 165 MG/DL (ref 100–199)
CO2 SERPL-SCNC: 25 MMOL/L (ref 20–29)
CREAT SERPL-MCNC: 0.8 MG/DL (ref 0.57–1)
EOSINOPHIL # BLD AUTO: 0.1 X10E3/UL (ref 0–0.4)
EOSINOPHIL NFR BLD AUTO: 1 %
GLOBULIN SER CALC-MCNC: 2.4 G/DL (ref 1.5–4.5)
GLUCOSE SERPL-MCNC: 73 MG/DL (ref 65–99)
HCT VFR BLD AUTO: 39.1 % (ref 34–46.6)
HDLC SERPL-MCNC: 60 MG/DL
HGB BLD-MCNC: 12.3 G/DL (ref 11.1–15.9)
IMM GRANULOCYTES # BLD AUTO: 0 X10E3/UL (ref 0–0.1)
IMM GRANULOCYTES NFR BLD AUTO: 1 %
INTERPRETATION, 910389: NORMAL
IRON SATN MFR SERPL: 31 % (ref 15–55)
IRON SERPL-MCNC: 95 UG/DL (ref 27–159)
LDLC SERPL CALC-MCNC: 79 MG/DL (ref 0–99)
LYMPHOCYTES # BLD AUTO: 0.6 X10E3/UL (ref 0.7–3.1)
LYMPHOCYTES NFR BLD AUTO: 13 %
MCH RBC QN AUTO: 27.4 PG (ref 26.6–33)
MCHC RBC AUTO-ENTMCNC: 31.5 G/DL (ref 31.5–35.7)
MCV RBC AUTO: 87 FL (ref 79–97)
MONOCYTES # BLD AUTO: 0.5 X10E3/UL (ref 0.1–0.9)
MONOCYTES NFR BLD AUTO: 11 %
MORPHOLOGY BLD-IMP: ABNORMAL
NEUTROPHILS # BLD AUTO: 3.4 X10E3/UL (ref 1.4–7)
NEUTROPHILS NFR BLD AUTO: 73 %
PLATELET # BLD AUTO: 123 X10E3/UL (ref 150–450)
POTASSIUM SERPL-SCNC: 5.3 MMOL/L (ref 3.5–5.2)
PROT SERPL-MCNC: 6.6 G/DL (ref 6–8.5)
RBC # BLD AUTO: 4.49 X10E6/UL (ref 3.77–5.28)
SODIUM SERPL-SCNC: 138 MMOL/L (ref 134–144)
T4 FREE SERPL-MCNC: 0.65 NG/DL (ref 0.82–1.77)
TIBC SERPL-MCNC: 306 UG/DL (ref 250–450)
TRIGL SERPL-MCNC: 132 MG/DL (ref 0–149)
TSH SERPL DL<=0.005 MIU/L-ACNC: 2.57 UIU/ML (ref 0.45–4.5)
UIBC SERPL-MCNC: 211 UG/DL (ref 131–425)
VIT B12 SERPL-MCNC: 862 PG/ML (ref 232–1245)
VLDLC SERPL CALC-MCNC: 26 MG/DL (ref 5–40)
WBC # BLD AUTO: 4.6 X10E3/UL (ref 3.4–10.8)

## 2020-02-28 RX ORDER — LISINOPRIL 2.5 MG/1
2.5 TABLET ORAL DAILY
Qty: 90 TAB | Refills: 1 | Status: SHIPPED | OUTPATIENT
Start: 2020-02-28 | End: 2020-08-22

## 2020-02-28 RX ORDER — PANTOPRAZOLE SODIUM 40 MG/1
40 TABLET, DELAYED RELEASE ORAL
Status: CANCELLED | OUTPATIENT
Start: 2020-02-28

## 2020-02-28 RX ORDER — PANTOPRAZOLE SODIUM 40 MG/1
40 TABLET, DELAYED RELEASE ORAL DAILY
Qty: 90 TAB | Refills: 1 | Status: SHIPPED | OUTPATIENT
Start: 2020-02-28 | End: 2020-08-22

## 2020-02-28 NOTE — TELEPHONE ENCOUNTER
.Pharmacy is requesting a 30 day supply refill on the medication. .  Requested Prescriptions     Pending Prescriptions Disp Refills    lisinopril (PRINIVIL, ZESTRIL) 2.5 mg tablet       Si Tab.              .Pharmacy on file verified        LastRefill:2020        LOV: 2020  UOV: Wednesday, May 20, 2020

## 2020-02-28 NOTE — TELEPHONE ENCOUNTER
.Pharmacy is requesting a 30 day supply refill on the medication. .  Requested Prescriptions     Pending Prescriptions Disp Refills    pantoprazole (PROTONIX) 40 mg tablet       Si Tab.            .Pharmacy on file verified        LastRefill:2020        LOV:2020  UOV:  Wednesday, May 20, 2020

## 2020-03-05 ENCOUNTER — HOSPITAL ENCOUNTER (OUTPATIENT)
Dept: PHYSICAL THERAPY | Age: 50
Discharge: HOME | End: 2020-03-05
Payer: MEDICARE

## 2020-03-05 PROCEDURE — 97161 PT EVAL LOW COMPLEX 20 MIN: CPT | Performed by: PHYSICAL THERAPY ASSISTANT

## 2020-03-05 PROCEDURE — 97110 THERAPEUTIC EXERCISES: CPT | Performed by: PHYSICAL THERAPY ASSISTANT

## 2020-03-05 PROCEDURE — 97140 MANUAL THERAPY 1/> REGIONS: CPT | Performed by: PHYSICAL THERAPY ASSISTANT

## 2020-03-05 NOTE — PROGRESS NOTES
PT INITIAL EVALUATION NOTE - [facility] 2-15    Patient Name: Carolee Perez  Date:3/5/2020  : 1970  [x]  Patient  Verified  Payor: Chaim Backers / Plan: [facility] MEDICARE COMPLETE / Product Type: Managed Care Medicare /    In time:9:10 pm  Out time: 10:05  Total Treatment Time (min): 55  Total Timed Codes (min): 25  1:1 Treatment Time ( only): 25   Visit #: 1     Treatment Area: Neck pain [M54.2]  Muscle weakness (generalized) [M62.81]    SUBJECTIVE  Pain Level (0-10 scale): 2  Any medication changes, allergies to medications, adverse drug reactions, diagnosis change, or new procedure performed?: [] No    [x] Yes (see summary sheet for update)  Subjective:    Pt complains of general weakness/neck pain/dizziness that started in December/January when she was diagnosed w/ sepsis and was hospitalized for 10 days. Pt states she was taken to the hospital by her sister because she was acting very differently. Pt reports she has lost over 30 lbs in the past 2-3 months. Pt quit smoking 57 days ago and reports feeling better w/ breathing. Pt has PMH of cancer and radiation treatment (did not state type of cancer). Pt used to work as  for 15-18 hours per week but has not returned to work since hospitalization. Pt also states she has a history of vertigo and has dizziness when turning her head. Pt has some dizziness when transferring positions. Pt has had many diagnostic tests done and all were negative. PLOF:   Mechanism of Injury: hospitalization  Previous Treatment/Compliance: see chart  PMHx/Surgical Hx: see chart  Work Hx:   Living Situation: w/ sister and father  Pt Goals: \"get my strength back\"  Barriers: vertigo, cancer  Motivation: good  Substance use: previous cigarette smoker for 30 years  FABQ Score: see FOTO  Cognition: A & O x 4        OBJECTIVE/EXAMINATION  Posture:   Forward head and rounded shoulders  Other Observations:  Guarded w/ motion  Gait and Functional Mobility:  Slow gait speed, needs to sit at edge of table for 15 seconds before standing due to dizziness  Palpation: TTP over bilateral upper traps, levator scap R>L, suboccipitals    Cervical ROM: full flexion, pain and blurred vision w/ cervical rotation bilaterally    LOWER QUARTER   MUSCLE STRENGTH  KEY       R  L  0 - No Contraction  L1, L2 Psoas  4-  4-  1 - Trace   L3 Quads  4-  4-  2 - Poor   L4 Tib Ant  4  4  3 - Fair    L5 EHL  5  5  4 - Good   S1 FHL  5  5  5 - Normal   S2 Hams  4  4          MMT: pain w/ shoulder abd  Neurological: Reflexes / Sensations: normal  Special Tests: - spurlings; + vertebral artery test (blurred vision after 6 seconds)  Romberg EO: 30 sec, EC 15 sec    Modality rationale: decrease pain, increase tissue extensibility and increase muscle contraction/control to improve the patients ability to ambulate and turn head   Min Type Additional Details    [] Estim: []Att   []Unatt        []TENS instruct                  []IFC  []Premod   []NMES                     []Other:  []w/US   []w/ice   []w/heat  Position:  Location:    []  Traction: [] Cervical       []Lumbar                       [] Prone          []Supine                       []Intermittent   []Continuous Lbs:  [] before manual  [] after manual  []w/heat    []  Ultrasound: []Continuous   [] Pulsed at:                           []1MHz   []3MHz Location:  W/cm2:    [] Paraffin         Location:   []w/heat   10 []  Ice     [x]  Heat  []  Ice massage Position: supine  Location: cervical spine    []  Laser  []  Other: Position:  Location:      []  Vasopneumatic Device Pressure:       [] lo [] med [] hi   Temperature:      [x] Skin assessment post-treatment:  [x]intact []redness- no adverse reaction    []redness - adverse reaction:     15 min Therapeutic Exercise:  [x] See flow sheet :   Rationale: increase ROM, increase strength and improve coordination to improve the patients ability to ambulate and transfer    10 min Manual Therapy: STM to suboccipitals, upper trap, levator scap    Rationale: decrease pain, increase tissue extensibility and decrease trigger points to improve the patients ability to decrease headaches and concentrate          With   [x] TE   [] TA   [] neuro   [] other: Patient Education: [x] Review HEP    [] Progressed/Changed HEP based on:   [x] positioning   [x] body mechanics   [] transfers   [x] heat/ice application    [x] other: blurred vision and + vertebral artery test, pt told to avoid end ranges of motion and take time when transferring      Other Objective/Functional Measures: NT    Pain Level (0-10 scale) post treatment: 2    ASSESSMENT/Changes in Function:     [x]  See Plan of Care      Quincy Elder PT, DPT, OCS 3/5/2020

## 2020-03-05 NOTE — PROGRESS NOTES
[facility] [employer] [facility]  [facility], [address] [place] [address]  Phone: [phone]  Fax: [phone]    Plan of Care/Statement of Necessity for Physical Therapy Services  2-15    Patient name: Luh Morales  : 1970  Provider#: [id number]  Referral source: Lynda Calderon MD      Medical/Treatment Diagnosis: Neck pain [M54.2]  Muscle weakness (generalized) [M62.81]     Prior Hospitalization: see medical history     Comorbidities: see chart  Prior Level of Function: see chart  Medications: Verified on Patient Summary List    Start of Care: 3/5/2020      Onset Date: 2019       The Plan of Care and following information is based on the information from the initial evaluation. Assessment/ key information: Pt has signs and symptoms of cervicalgia and general weakness/poor balance that affects her ability to perform ADL's, work, sleep, and transfer. Pt is a good candidate for therapy but does have red flag of + vertebral artery test.    Problem List: pain affecting function, decrease ROM, decrease strength, edema affecting function, impaired gait/ balance, decrease ADL/ functional abilitiies, decrease activity tolerance, decrease flexibility/ joint mobility and decrease transfer abilities   Treatment Plan may include any combination of the following: Therapeutic exercise, Therapeutic activities, Neuromuscular re-education, Physical agent/modality, Gait/balance training, Manual therapy and Patient education  Patient / Family readiness to learn indicated by: asking questions  Persons(s) to be included in education: patient (P)  Barriers to Learning/Limitations: previous cancer  Patient Goal (s): improve my strength so I can return to [facility]  Patient Self Reported Health Status: fair  Rehabilitation Potential: good    Short Term Goals:  To be accomplished in 2 treatments:  Pt will be I w/ HEP  Pt will demonstrate proper sitting posture for over 20 minutes  Pt will be able to work part time w/o increase in pain  Long Term Goals: To be accomplished in 16 treatments:  Pt will report over 10 point improvement on FOTO  Pt will demonstrate over 10 sec tandem stance  Pt will demonstrate over 4/5 LE strength  Frequency / Duration: Patient to be seen 2 times per week for 6-8 weeks. Patient/ Caregiver education and instruction: self care and activity modification    [x]  Plan of care has been reviewed with PTA    Robert Mohr, PT, DPT, OCS 3/5/2020   ________________________________________________________________________    I certify that the above Therapy Services are being furnished while the patient is under my care. I agree with the treatment plan and certify that this therapy is necessary.     [de-identified] Signature:____________________  Date:____________Time: _________

## 2020-03-18 ENCOUNTER — APPOINTMENT (OUTPATIENT)
Dept: PHYSICAL THERAPY | Age: 50
End: 2020-03-18
Payer: MEDICARE

## 2020-03-20 ENCOUNTER — APPOINTMENT (OUTPATIENT)
Dept: PHYSICAL THERAPY | Age: 50
End: 2020-03-20
Payer: MEDICARE

## 2020-03-20 DIAGNOSIS — K21.9 GASTROESOPHAGEAL REFLUX DISEASE WITHOUT ESOPHAGITIS: ICD-10-CM

## 2020-03-20 DIAGNOSIS — F41.9 ANXIETY: ICD-10-CM

## 2020-03-22 RX ORDER — ALPRAZOLAM 0.5 MG/1
TABLET ORAL
Qty: 60 TAB | Refills: 2 | Status: SHIPPED | OUTPATIENT
Start: 2020-03-22 | End: 2020-06-18

## 2020-03-22 RX ORDER — METOCLOPRAMIDE 5 MG/1
TABLET ORAL
Qty: 90 TAB | Refills: 1 | Status: SHIPPED | OUTPATIENT
Start: 2020-03-22 | End: 2020-08-16

## 2020-04-21 RX ORDER — CLOPIDOGREL BISULFATE 75 MG/1
TABLET ORAL
Qty: 90 TAB | Refills: 1 | Status: SHIPPED | OUTPATIENT
Start: 2020-04-21 | End: 2020-10-17

## 2020-04-24 DIAGNOSIS — E78.00 HYPERCHOLESTEROLEMIA: ICD-10-CM

## 2020-04-24 RX ORDER — SIMVASTATIN 40 MG/1
TABLET, FILM COATED ORAL
Qty: 90 TAB | Refills: 1 | Status: SHIPPED | OUTPATIENT
Start: 2020-04-24 | End: 2020-11-17

## 2020-05-19 RX ORDER — TIOTROPIUM BROMIDE 18 UG/1
CAPSULE ORAL; RESPIRATORY (INHALATION)
Qty: 30 CAP | Refills: 5 | Status: SHIPPED | OUTPATIENT
Start: 2020-05-19 | End: 2021-05-05

## 2020-05-20 ENCOUNTER — VIRTUAL VISIT (OUTPATIENT)
Dept: FAMILY MEDICINE CLINIC | Age: 50
End: 2020-05-20

## 2020-05-20 VITALS — SYSTOLIC BLOOD PRESSURE: 120 MMHG | DIASTOLIC BLOOD PRESSURE: 80 MMHG

## 2020-05-20 DIAGNOSIS — G25.81 RLS (RESTLESS LEGS SYNDROME): Primary | ICD-10-CM

## 2020-05-20 RX ORDER — GABAPENTIN 300 MG/1
CAPSULE ORAL
Qty: 90 CAP | Refills: 0 | Status: SHIPPED | OUTPATIENT
Start: 2020-05-20 | End: 2020-05-26

## 2020-05-20 RX ORDER — GABAPENTIN 100 MG/1
CAPSULE ORAL
Qty: 90 CAP | Refills: 0 | Status: SHIPPED | OUTPATIENT
Start: 2020-05-20 | End: 2020-05-26
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
## 2020-05-26 DIAGNOSIS — G25.81 RLS (RESTLESS LEGS SYNDROME): ICD-10-CM

## 2020-05-26 RX ORDER — GABAPENTIN 300 MG/1
CAPSULE ORAL
Qty: 90 CAP | Refills: 0 | Status: SHIPPED | OUTPATIENT
Start: 2020-05-26 | End: 2021-01-20

## 2020-05-26 RX ORDER — GABAPENTIN 100 MG/1
CAPSULE ORAL
Qty: 90 CAP | Refills: 0 | Status: SHIPPED | OUTPATIENT
Start: 2020-05-26 | End: 2020-08-24

## 2020-06-18 DIAGNOSIS — F41.9 ANXIETY: ICD-10-CM

## 2020-06-18 DIAGNOSIS — G25.81 RLS (RESTLESS LEGS SYNDROME): ICD-10-CM

## 2020-06-18 RX ORDER — ROPINIROLE 0.5 MG/1
TABLET, FILM COATED ORAL
Qty: 270 TAB | Refills: 1 | Status: SHIPPED | OUTPATIENT
Start: 2020-06-18 | End: 2021-05-25 | Stop reason: SDUPTHER

## 2020-06-18 RX ORDER — ALPRAZOLAM 0.5 MG/1
TABLET ORAL
Qty: 60 TAB | Refills: 1 | Status: SHIPPED | OUTPATIENT
Start: 2020-06-18 | End: 2020-08-18

## 2020-06-18 RX ORDER — ESCITALOPRAM OXALATE 20 MG/1
TABLET ORAL
Qty: 90 TAB | Refills: 1 | Status: SHIPPED | OUTPATIENT
Start: 2020-06-18 | End: 2020-12-16

## 2020-06-29 ENCOUNTER — TELEPHONE (OUTPATIENT)
Dept: OTHER | Age: 50
End: 2020-06-29

## 2020-06-29 NOTE — TELEPHONE ENCOUNTER
This writer called patient related to medication adherence, patient verified name and date of birth. This writer advised patient, I am calling from [facility] [place] and work with the [facility]. Medications were reviewed patient reports she is in compliance with medication verified on her list.     Patient reports her SIMVASTATIN TAB 40MG and LISINOPRIL TAB 2.5MG were both filled in may for a 90 day Rx and will be filled again august 2020.

## 2020-08-12 DIAGNOSIS — K21.9 GASTROESOPHAGEAL REFLUX DISEASE WITHOUT ESOPHAGITIS: ICD-10-CM

## 2020-08-16 RX ORDER — METOCLOPRAMIDE 5 MG/1
TABLET ORAL
Qty: 90 TAB | Refills: 1 | Status: SHIPPED | OUTPATIENT
Start: 2020-08-16 | End: 2020-11-17 | Stop reason: SDUPTHER

## 2020-08-18 ENCOUNTER — TELEPHONE (OUTPATIENT)
Dept: FAMILY MEDICINE CLINIC | Age: 50
End: 2020-08-18

## 2020-08-18 DIAGNOSIS — F41.9 ANXIETY: ICD-10-CM

## 2020-08-18 RX ORDER — ALPRAZOLAM 0.5 MG/1
TABLET ORAL
Qty: 60 TAB | Refills: 2 | Status: SHIPPED | OUTPATIENT
Start: 2020-08-18 | End: 2020-11-16 | Stop reason: SDUPTHER

## 2020-08-18 NOTE — TELEPHONE ENCOUNTER
----- Message from John Moran sent at 8/14/2020  8:45 AM EDT -----  Regarding: Dr. Kristie Polk / telephone  Contact: [phone]  Caller's first and last name and relationship (if not the patient): n/a  Best contact number(s): [phone]  Whose call is being returned: the practice  Details to clarify the request: n/a

## 2020-08-20 ENCOUNTER — OFFICE VISIT (OUTPATIENT)
Dept: FAMILY MEDICINE CLINIC | Age: 50
End: 2020-08-20
Payer: MEDICARE

## 2020-08-20 VITALS
HEIGHT: 64 IN | WEIGHT: 169.2 LBS | OXYGEN SATURATION: 97 % | DIASTOLIC BLOOD PRESSURE: 88 MMHG | BODY MASS INDEX: 28.89 KG/M2 | RESPIRATION RATE: 16 BRPM | TEMPERATURE: 97.7 F | SYSTOLIC BLOOD PRESSURE: 132 MMHG | HEART RATE: 86 BPM

## 2020-08-20 DIAGNOSIS — F41.8 MIXED ANXIETY AND DEPRESSIVE DISORDER: ICD-10-CM

## 2020-08-20 DIAGNOSIS — Z00.00 MEDICARE ANNUAL WELLNESS VISIT, SUBSEQUENT: ICD-10-CM

## 2020-08-20 DIAGNOSIS — J43.2 CENTRILOBULAR EMPHYSEMA (HCC): ICD-10-CM

## 2020-08-20 DIAGNOSIS — Z71.89 ACP (ADVANCE CARE PLANNING): ICD-10-CM

## 2020-08-20 DIAGNOSIS — I25.10 CORONARY ARTERY DISEASE INVOLVING NATIVE CORONARY ARTERY OF NATIVE HEART WITHOUT ANGINA PECTORIS: Primary | ICD-10-CM

## 2020-08-20 DIAGNOSIS — Z12.31 VISIT FOR SCREENING MAMMOGRAM: ICD-10-CM

## 2020-08-20 DIAGNOSIS — C34.91 NON-SMALL CELL CANCER OF RIGHT LUNG (HCC): ICD-10-CM

## 2020-08-20 DIAGNOSIS — I73.9 PAD (PERIPHERAL ARTERY DISEASE) (HCC): ICD-10-CM

## 2020-08-20 DIAGNOSIS — E78.00 HYPERCHOLESTEROLEMIA: ICD-10-CM

## 2020-08-20 PROBLEM — J96.01 ACUTE RESPIRATORY FAILURE WITH HYPOXIA (HCC): Status: ACTIVE | Noted: 2020-08-20

## 2020-08-20 PROBLEM — J96.01 ACUTE RESPIRATORY FAILURE WITH HYPOXIA (HCC): Status: RESOLVED | Noted: 2020-08-20 | Resolved: 2020-08-20

## 2020-08-20 PROCEDURE — G8754 DIAS BP LESS 90: HCPCS | Performed by: NURSE PRACTITIONER

## 2020-08-20 PROCEDURE — G8419 CALC BMI OUT NRM PARAM NOF/U: HCPCS | Performed by: NURSE PRACTITIONER

## 2020-08-20 PROCEDURE — G8752 SYS BP LESS 140: HCPCS | Performed by: NURSE PRACTITIONER

## 2020-08-20 PROCEDURE — G9899 SCRN MAM PERF RSLTS DOC: HCPCS | Performed by: NURSE PRACTITIONER

## 2020-08-20 PROCEDURE — 99214 OFFICE O/P EST MOD 30 MIN: CPT | Performed by: NURSE PRACTITIONER

## 2020-08-20 PROCEDURE — 3017F COLORECTAL CA SCREEN DOC REV: CPT | Performed by: NURSE PRACTITIONER

## 2020-08-20 PROCEDURE — G0439 PPPS, SUBSEQ VISIT: HCPCS | Performed by: NURSE PRACTITIONER

## 2020-08-20 PROCEDURE — G8427 DOCREV CUR MEDS BY ELIG CLIN: HCPCS | Performed by: NURSE PRACTITIONER

## 2020-08-20 PROCEDURE — G9717 DOC PT DX DEP/BP F/U NT REQ: HCPCS | Performed by: NURSE PRACTITIONER

## 2020-08-20 NOTE — PROGRESS NOTES
[address] [facility] Mary Biswas is a 44 y.o. female who was seen in clinic today (8/20/2020). Subjective:  Cardiovascular Review:  The patient has hypertension, hyperlipidemia, coronary artery disease, history of prior MI (2005, 2009), peripheral vascular disease and status post coronary artery stenting (2005, 2009). Patient had atherectomy and angioplasty to right SFA and left popliteal artery (11/2017). LVEF: 43%. Seen by cardiology, Dr. Rod Barajas. Diet and Lifestyle: generally follows a low fat low cholesterol diet, generally follows a low sodium diet, sedentary, nonsmoker  Home BP Monitoring: is not measured at home. Pertinent ROS: taking medications as instructed, no medication side effects noted, no TIA's, no chest pain on exertion, no dyspnea on exertion, no swelling of ankles. Lung Cancer  Patient has NSCLC (2015). She underwent chemotherapy. Also had an enlargement of subcarinal node. Underwent biopsy 1/2018; pathology showed adenoma. Treated with radiation therapy (4/2019). Repeat imaging shows stability of mas. Patient quit smoking in 1/2020, has 40 pack year history. Patient seen by oncology, Dr. Gilmar Craft. Patient had a EGD in 1/2020 with Dr. Purnima Esparza and was found to have esophageal stricture and ulcer. Currently taking PPI. Last colonoscopy 6/2015. Anxiety Review:  Patient is seen for anxiety disorder, panic attacks, sleep disturbance. Current treatment includes Lexapro, Xanax and no other therapies. Ongoing symptoms include: palpitations, insomnia, racing thoughts, psychomotor agitation, feelings of losing control, difficulty concentrating. Patient denies: chest pain, shortness of breath, suicidal ideation, homocidal ideation. Reported side effects from the treatment: none. Prior to Admission medications    Medication Sig Start Date End Date Taking?  Authorizing Provider   ALPRAZolam Will Wolff) 0.5 mg tablet TAKE 1 TABLET BY MOUTH TWICE DAILY AS NEEDED FOR ANXIETY 8/18/20  Yes Shawna Jane MD   metoclopramide HCl (REGLAN) 5 mg tablet TAKE 1 TABLET BY MOUTH THREE TIMES DAILY BEFORE MEALS 8/16/20  Yes Shawna Jane MD   escitalopram oxalate (LEXAPRO) 20 mg tablet TAKE 1 TABLET BY MOUTH EVERY DAY AS NEEDED FOR ANXIETY 6/18/20  Yes Shawna Jane MD   rOPINIRole (REQUIP) 0.5 mg tablet TAKE 1 TABLET BY MOUTH THREE TIMES DAILY 6/18/20  Yes Shawna Jane MD   gabapentin (NEURONTIN) 300 mg capsule TAKE 1 CAPSULE BY MOUTH EVERY NIGHT AT BEDTIME 5/26/20  Yes Shawna Jane MD   gabapentin (NEURONTIN) 100 mg capsule TAKE 1 CAPSULE BY MOUTH EVERY MORNING 5/26/20  Yes Shawna Jane MD   Spiriva with HandiHaler 18 mcg inhalation capsule INHALE THE CONTENTS OF 1 CAPSULE VIA INHALATION DEVICE DAILY 5/19/20  Yes Shawna Jane MD   simvastatin (ZOCOR) 40 mg tablet TAKE 1 TABLET BY MOUTH EVERY EVERY NIGHT 4/24/20  Yes Shawna Jane MD   clopidogreL (PLAVIX) 75 mg tab TAKE 1 TABLET BY MOUTH DAILY 4/21/20  Yes Shawna Jane MD   lisinopril (PRINIVIL, ZESTRIL) 2.5 mg tablet Take 1 Tab by mouth daily. 2/28/20  Yes Shawna Jane MD   pantoprazole (PROTONIX) 40 mg tablet Take 1 Tab by mouth daily. 2/28/20  Yes Shawna Jane MD   famotidine (PEPCID) 40 mg tablet TAKE 1 TABLET BY MOUTH EVERY DAY 2/23/20  Yes Hemant Carballo MD   sucralfate (CARAFATE) 100 mg/mL suspension 1 g. 1/17/20  Yes Provider, Historical   nitroglycerin (NITROSTAT) 0.4 mg SL tablet 0.4 mg. Yes Provider, Historical   albuterol (PROVENTIL HFA, VENTOLIN HFA, PROAIR HFA) 90 mcg/actuation inhaler Take 2 Puffs by inhalation every six (6) hours as needed for Wheezing. 3/27/19  Yes Shawna Jane MD   potassium chloride (K-DUR, KLOR-CON) 20 mEq tablet TAKE 2 TABLETS BY MOUTH DAILY 2/25/20 8/20/20  Polo Carballo MD   nicotine (NICODERM CQ) 14 mg/24 hr patch 1 Patch by TransDERmal route every twenty-four (24) hours.  2/25/20 8/20/20  Shawna Jane MD carvediloL (COREG) 3.125 mg tablet TK 1 T BID AT 6AM AND 6PM 1/17/20 8/20/20  Provider, Historical   linezolid (ZYVOX) 600 mg tablet 600 mg. 1/17/20 8/20/20  Provider, Historical   bumetanide (BUMEX) 0.5 mg tablet TAKE 1 TABLET BY MOUTH EVERY DAY FOR VISIBLE WATER RETENTION 11/7/19 8/20/20  Jose Antonio Alonzo MD   dextromethorphan-guaiFENesin [facility] [place] DM) 60-1,200 mg Tb12 Take 1 tablet by mouth twice daily for 10 days 10/31/19 8/20/20  Jose Antonio Alonzo MD   ferrous sulfate 325 mg (65 mg iron) tablet Take 1 Tab by mouth two (2) times a day. 8/23/19 8/20/20  Desiree Carballo MD   omeprazole (PRILOSEC) 20 mg capsule Take 1 Cap by mouth daily. Patient taking differently: Take 20 mg by mouth daily. Indications: take 2 20mg daily 6/27/13 8/20/20  Jose Antonio Alonzo MD          Allergies   Allergen Reactions    Lemon Oil Nausea and Vomiting    Amoxil [Amoxicillin] Diarrhea    Anoro Ellipta [Umeclidinium-Vilanterol] Rash    Codeine Nausea and Vomiting    Fluticasone-Umeclidin-Vilanter Rash    Metoprolol Nausea and Vomiting           ROS  See HPI    Objective:   Physical Exam  Vitals signs and nursing note reviewed. Constitutional:       Appearance: She is well-developed. Neck:      Musculoskeletal: Normal range of motion and neck supple. Thyroid: No thyromegaly. Vascular: No carotid bruit or JVD. Cardiovascular:      Rate and Rhythm: Normal rate and regular rhythm. Heart sounds: No murmur. No friction rub. No gallop. Pulmonary:      Effort: Pulmonary effort is normal. No respiratory distress. Breath sounds: Normal breath sounds. Lymphadenopathy:      Cervical: No cervical adenopathy. Neurological:      Mental Status: She is alert and oriented to person, place, and time.    Psychiatric:         Behavior: Behavior normal.           Visit Vitals  /88 (BP 1 Location: Right arm, BP Patient Position: Sitting)   Pulse 86   Temp 97.7 °F (36.5 °C) (Oral)   Resp 16   Ht 5' 4\" (1.626 m)   Wt 169 lb 3.2 oz (76.7 kg)   SpO2 97%   BMI 29.04 kg/m²       Assessment & Plan:  Diagnoses and all orders for this visit:    1. Coronary artery disease involving native coronary artery of native heart without angina pectoris  Stable, no changes to current therapy. Managed by cardiology. 2. PAD (peripheral artery disease) (HCC)  Stable, no changes to current therapy. Managed by cardiology. 3. Non-small cell cancer of right lung (HCC)  Stable, no changes to current therapy. Managed by oncology. 4. Hypercholesterolemia  On statin therapy, no changes. 5. Visit for screening mammogram  -     [facility] MAMMO BI SCREENING INCL CAD; Future    6. Mixed anxiety and depressive disorder  Stable, no changes to current therapy    7. Centrilobular emphysema  Stable with smoking cessation. Continue Spiriva daily. 8. Medicare annual wellness visit, subsequent    8. ACP (advance care planning)        I have discussed the diagnosis with the patient and the intended plan as seen in the above orders. The patient has received an after-visit summary along with patient information handout. I have discussed medication side effects and warnings with the patient as well. Follow-up and Dispositions    · Return in about 4 months (around 12/20/2020) for disease management.            Luis Garduno NP

## 2020-08-20 NOTE — PROGRESS NOTES
Chief Complaint   Patient presents with    COPD    Cholesterol Problem    Anemia     1. Have you been to the ER, urgent care clinic since your last visit? Hospitalized since your last visit? No    2. Have you seen or consulted any other health care providers outside of the [address] [place] [address] since your last visit? Include any pap smears or colon screening.  Yes When: Dr Marci Patino ([facility])     Dr Trice Catalan (cardiologist)

## 2020-08-20 NOTE — PATIENT INSTRUCTIONS
Learning About Breast Cancer Screening  What is breast cancer screening? Breast cancer occurs when cells that are not normal grow in one or both of your breasts. Screening tests can help find breast cancer early. Cancer is easier to treat when it's found early. Having concerns about breast cancer is common. That's why it's important to talk with your doctor about when to start and how often to get screened for breast cancer. How is breast cancer screening done? Several screening tests can be used to check for breast cancer. Mammograms. These tests check for signs of cancer using X-rays. They can show tumors that are too small for you or your doctor to feel. During a mammogram, a machine squeezes your breasts to make them flatter and easier to X-ray. At least two pictures are taken of each breast. One is taken from the top and one from the side. 3-D mammograms. These tests are also called digital breast tomosynthesis. Your breast is positioned on a flat plate. A top plate is pressed against your breast to keep it in position. The X-ray arm then moves in an arc above the breast and takes many pictures. A computer uses these X-rays to create a three-dimensional image. Clinical breast exam.   In this exam, your doctor carefully feels your breasts and under your arms to check for lumps or other changes. When should you get screened? Talk with your doctor about when you should start being tested for breast cancer. How often you get tested and the kind of tests you get will depend on your age and your risk. The guidelines that follow are for women who have an average risk for breast cancer. If you have a higher risk, such as having a family history of breast cancer in multiple relatives or at a young age, your doctor may recommend different screening for you. · Ages 21 to 44: Some experts recommend that women have a clinical breast exam every 1 to 3 years, starting at age 22.  Ask your doctor how often you should have this test. If you have a high risk for breast cancer, talk with your doctor about the best schedule and tests for you. · Ages 36 and older: Talk with your doctor about how often you should have mammograms and clinical breast exams. What is your risk for breast cancer? If you don't already know your risk of breast cancer, you can ask your doctor about it. You can also look it up at www.cancer.gov/bcrisktool/. If your doctor says that you have a high or very high risk, ask about ways to reduce your risk. These could include getting extra screening, taking medicine, or having surgery. If you have a strong family history of breast cancer, ask your doctor about genetic testing. What steps can you take to stay healthy? Some things that increase your risk of breast cancer, such as your age and being female, cannot be controlled. But you can do some things to stay as healthy as you can. · Learn what your breasts normally look and feel like. If you notice any changes, tell your doctor. · If you drink alcohol, limit how much you drink. Any amount of alcohol may increase your risk for some types of cancer. · If you smoke, quit. When you quit smoking, you lower your chances of getting many types of cancer. You can also do your best to eat well, be active, and stay at a healthy weight. Eating healthy foods and being active every day, as well as staying at a healthy weight, may help prevent cancer. Where can you learn more? Go to http://yuliya-bryan.info/  Enter H706 in the search box to learn more about \"Learning About Breast Cancer Screening. \"  Current as of: August 22, 2019               Content Version: 12.5  © 3708-7329 Healthwise, Incorporated. Care instructions adapted under license by [employer] (which disclaims liability or warranty for this information).  If you have questions about a medical condition or this instruction, always ask your healthcare professional. [address] any warranty or liability for your use of this information.

## 2020-08-20 NOTE — PROGRESS NOTES
This is the Subsequent Medicare Annual Wellness Exam, performed 12 months or more after the Initial AWV or the last Subsequent AWV    I have reviewed the patient's medical history in detail and updated the computerized patient record.      History     Patient Active Problem List   Diagnosis Code    GERD (gastroesophageal reflux disease) K21.9    Mixed anxiety and depressive disorder F41.8    CAD (coronary artery disease) I25.10    Hypercholesterolemia E78.00    Post traumatic stress disorder (PTSD) F43.10    S/P hip replacement Z96.649    Polycystic ovaries E28.2    Malignant neoplasm of upper lobe of right lung ([address] C34.11    HTN (hypertension), benign I10    Over weight E66.3    PAD (peripheral artery disease) (HCC) I73.9    Non-small cell cancer of right lung (HCC) C34.91    Centrilobular emphysema ([address] J43.2     Past Medical History:   Diagnosis Date    Acute respiratory failure with hypoxia ([address] 01/2020    Adenoma of right lung 2018    CAD (coronary artery disease)     stents 2005 and 2009    Depression     Esophageal ulcer 01/2020    GERD (gastroesophageal reflux disease)     Hypercholesterolemia     elevated particle number    Hypertension     MI (myocardial infarction) ([address] 2005, 2009    Non-small cell cancer of right lung ([address] 08/2015    Osteoarthritis     PAD (peripheral artery disease) ([address] 2017    TIA (transient ischemic attack)     Tobacco abuse 2/28/2014      Past Surgical History:   Procedure Laterality Date    HAND/FINGER SURGERY UNLISTED      left hand     HX CHOLECYSTECTOMY  2002    HX COLONOSCOPY  06/2015    HX ENDOSCOPY  2014, 2020    HX HEART CATHETERIZATION  2005, 2009    HX HIP REPLACEMENT Bilateral     HX PATRICIA AND BSO  2007        HX VASCULAR ANGIOPLASTY Bilateral 2017    right SFA, left politeal artery     Current Outpatient Medications   Medication Sig Dispense Refill    ALPRAZolam (XANAX) 0.5 mg tablet TAKE 1 TABLET BY MOUTH TWICE DAILY AS NEEDED FOR ANXIETY 60 Tab 2    metoclopramide HCl (REGLAN) 5 mg tablet TAKE 1 TABLET BY MOUTH THREE TIMES DAILY BEFORE MEALS 90 Tab 1    escitalopram oxalate (LEXAPRO) 20 mg tablet TAKE 1 TABLET BY MOUTH EVERY DAY AS NEEDED FOR ANXIETY 90 Tab 1    rOPINIRole (REQUIP) 0.5 mg tablet TAKE 1 TABLET BY MOUTH THREE TIMES DAILY 270 Tab 1    gabapentin (NEURONTIN) 300 mg capsule TAKE 1 CAPSULE BY MOUTH EVERY NIGHT AT BEDTIME 90 Cap 0    gabapentin (NEURONTIN) 100 mg capsule TAKE 1 CAPSULE BY MOUTH EVERY MORNING 90 Cap 0    Spiriva with HandiHaler 18 mcg inhalation capsule INHALE THE CONTENTS OF 1 CAPSULE VIA INHALATION DEVICE DAILY 30 Cap 5    simvastatin (ZOCOR) 40 mg tablet TAKE 1 TABLET BY MOUTH EVERY EVERY NIGHT 90 Tab 1    clopidogreL (PLAVIX) 75 mg tab TAKE 1 TABLET BY MOUTH DAILY 90 Tab 1    lisinopril (PRINIVIL, ZESTRIL) 2.5 mg tablet Take 1 Tab by mouth daily. 90 Tab 1    pantoprazole (PROTONIX) 40 mg tablet Take 1 Tab by mouth daily. 90 Tab 1    famotidine (PEPCID) 40 mg tablet TAKE 1 TABLET BY MOUTH EVERY DAY 90 Tab 1    sucralfate (CARAFATE) 100 mg/mL suspension 1 g.      nitroglycerin (NITROSTAT) 0.4 mg SL tablet 0.4 mg.      albuterol (PROVENTIL HFA, VENTOLIN HFA, PROAIR HFA) 90 mcg/actuation inhaler Take 2 Puffs by inhalation every six (6) hours as needed for Wheezing.  1 Inhaler 5     Allergies   Allergen Reactions    Lemon Oil Nausea and Vomiting    Amoxil [Amoxicillin] Diarrhea    Anoro Ellipta [Umeclidinium-Vilanterol] Rash    Codeine Nausea and Vomiting    Fluticasone-Umeclidin-Vilanter Rash    Metoprolol Nausea and Vomiting       Family History   Problem Relation Age of Onset    Cancer Mother         breast,ovarian colon     Hypertension Father     Cancer Maternal Grandmother         colon    Cancer Maternal Grandfather      Social History     Tobacco Use    Smoking status: Former Smoker     Packs/day: 1.00     Years: 40.00     Pack years: 40.00     Types: Cigarettes     Last attempt to quit: 2020     Years since quittin.6    Smokeless tobacco: Never Used   Substance Use Topics    Alcohol use: No       Depression Risk Factor Screening:     3 most recent PHQ Screens 2020   Little interest or pleasure in doing things Not at all   Feeling down, depressed, irritable, or hopeless Not at all   Total Score PHQ 2 0   Trouble falling or staying asleep, or sleeping too much -   Feeling tired or having little energy -   Poor appetite, weight loss, or overeating -   Feeling bad about yourself - or that you are a failure or have let yourself or your family down -   Trouble concentrating on things such as school, work, reading, or watching TV -   Moving or speaking so slowly that other people could have noticed; or the opposite being so fidgety that others notice -   Thoughts of being better off dead, or hurting yourself in some way -   PHQ 9 Score -   How difficult have these problems made it for you to do your work, take care of your home and get along with others -       Alcohol Risk Factor Screening:   Do you average 1 drink per night or more than 7 drinks a week:  No    On any one occasion in the past three months have you have had more than 3 drinks containing alcohol:  No      Functional Ability and Level of Safety:   Hearing: Hearing is good. Activities of Daily Living: The home contains: no safety equipment. Patient does total self care     Ambulation: with no difficulty     Fall Risk:  Fall Risk Assessment, last 12 mths 2020   Able to walk? Yes   Fall in past 12 months?  No     Abuse Screen:  Patient is not abused       Cognitive Screening   Has your family/caregiver stated any concerns about your memory: no     Patient Care Team   Patient Care Team:  Tiara Esqueda MD as PCP - General (Family Medicine)  Tiara Esqueda MD as PCP - REHABILITATION [facility] Empaneled Provider    Assessment/Plan   Education and counseling provided:  Are appropriate based on today's review and evaluation  Screening Mammography    Diagnoses and all orders for this visit:    1. Coronary artery disease involving native coronary artery of native heart without angina pectoris    2. PAD (peripheral artery disease) ([facility]    3. Non-small cell cancer of right lung ([facility]    4. Hypercholesterolemia    5. Visit for screening mammogram  -     [facility] MAMMO BI SCREENING INCL CAD; Future    6. Mixed anxiety and depressive disorder    7. Centrilobular emphysema ([facility]    8. Medicare annual wellness visit, subsequent    9.  ACP (advance care planning)        Health Maintenance Due   Topic Date Due    Colonoscopy  07/07/1986    Breast Cancer Screen Mammogram  07/07/2018    PAP AKA CERVICAL CYTOLOGY  02/08/2019    Medicare Yearly Exam  03/27/2020

## 2020-08-22 RX ORDER — PANTOPRAZOLE SODIUM 40 MG/1
TABLET, DELAYED RELEASE ORAL
Qty: 90 TAB | Refills: 1 | Status: SHIPPED | OUTPATIENT
Start: 2020-08-22 | End: 2020-09-02

## 2020-08-22 RX ORDER — LISINOPRIL 2.5 MG/1
TABLET ORAL
Qty: 90 TAB | Refills: 1 | Status: SHIPPED | OUTPATIENT
Start: 2020-08-22 | End: 2021-02-10

## 2020-08-22 RX ORDER — FAMOTIDINE 40 MG/1
TABLET, FILM COATED ORAL
Qty: 90 TAB | Refills: 1 | Status: SHIPPED | OUTPATIENT
Start: 2020-08-22 | End: 2021-02-10

## 2020-08-26 NOTE — PROGRESS NOTES
[facility]  [address]  [place], [address]  Phone: [phone]  Fax: [phone]    Discharge Summary  2-15    Patient name: Amna Menchaca  : 1970  Provider#:[id number]  Referral source: Fior Lake MD      Medical/Treatment Diagnosis: Neck pain [M54.2]  Muscle weakness (generalized) [M62.81]     Prior Hospitalization: see medical history     Comorbidities: see chart  Prior Level of Function:see chart  Medications: Verified on Patient Summary List        ASSESSMENT/SUMMARY OF CARE: Pt only attended initial eval and did not show for follow up appointments. Pt will be d/c'd from our care at this time.     RECOMMENDATIONS:  [x]Discontinue therapy: []Patient has reached or is progressing toward set goals      [x]Patient is non-compliant or has abdicated      []Due to lack of appreciable progress towards set goals    Yolie Toure, PT, DPT, OCS 2020

## 2020-09-02 RX ORDER — PANTOPRAZOLE SODIUM 40 MG/1
TABLET, DELAYED RELEASE ORAL
Qty: 90 TAB | Refills: 1 | Status: SHIPPED | OUTPATIENT
Start: 2020-09-02 | End: 2021-02-10

## 2020-10-17 RX ORDER — CLOPIDOGREL BISULFATE 75 MG/1
TABLET ORAL
Qty: 90 TAB | Refills: 1 | Status: SHIPPED | OUTPATIENT
Start: 2020-10-17 | End: 2021-02-22 | Stop reason: SDUPTHER

## 2020-11-16 DIAGNOSIS — F41.9 ANXIETY: ICD-10-CM

## 2020-11-16 DIAGNOSIS — E78.00 HYPERCHOLESTEROLEMIA: ICD-10-CM

## 2020-11-16 NOTE — TELEPHONE ENCOUNTER
MD Carballo,    Patient call requesting refills of below meds. She is wondering why rx not approved sent by pharmacy? Also requesting alprazolam.  Check . Thanks, Miki Díaz    Last Visit: 8/20/20  MONIQUE Andrade  Next Appointment: Not scheduled- due 12/20  Previous Refill Encounter(s):   Alprazolam 8/18/20  60 + 2  Simvastatin 4/24/20  90 + 1    Requested Prescriptions     Pending Prescriptions Disp Refills    ALPRAZolam (XANAX) 0.5 mg tablet 60 Tab 2     Sig: Take 1 Tab by mouth two (2) times daily as needed for Anxiety. Max Daily Amount: 1 mg.  simvastatin (ZOCOR) 40 mg tablet 90 Tab 1     Sig: Take 1 Tab by mouth nightly.

## 2020-11-17 DIAGNOSIS — K21.9 GASTROESOPHAGEAL REFLUX DISEASE WITHOUT ESOPHAGITIS: ICD-10-CM

## 2020-11-17 RX ORDER — SIMVASTATIN 40 MG/1
40 TABLET, FILM COATED ORAL
Qty: 90 TAB | Refills: 1 | Status: SHIPPED | OUTPATIENT
Start: 2020-11-17 | End: 2021-02-22 | Stop reason: SDUPTHER

## 2020-11-17 RX ORDER — ALPRAZOLAM 0.5 MG/1
0.5 TABLET ORAL
Qty: 60 TAB | Refills: 0 | Status: SHIPPED | OUTPATIENT
Start: 2020-11-17 | End: 2020-12-21 | Stop reason: SDUPTHER

## 2020-11-17 NOTE — TELEPHONE ENCOUNTER
Last Visit: 8/20/20  NP Lesia Erazo  Next Appointment: Tomorrow- MONIQUE Andrade  Previous Refill Encounter(s): 8/16/20  90 + 1 (2 months supply)      Requested Prescriptions     Pending Prescriptions Disp Refills    metoclopramide HCl (REGLAN) 5 mg tablet 270 Tab 0     Sig: TAKE 1 TABLET BY MOUTH THREE TIMES DAILY BEFORE MEALS

## 2020-11-18 ENCOUNTER — OFFICE VISIT (OUTPATIENT)
Dept: FAMILY MEDICINE CLINIC | Age: 50
End: 2020-11-18
Payer: MEDICARE

## 2020-11-18 VITALS
SYSTOLIC BLOOD PRESSURE: 111 MMHG | RESPIRATION RATE: 16 BRPM | DIASTOLIC BLOOD PRESSURE: 77 MMHG | WEIGHT: 182 LBS | HEART RATE: 95 BPM | TEMPERATURE: 97.7 F | HEIGHT: 64 IN | BODY MASS INDEX: 31.07 KG/M2 | OXYGEN SATURATION: 97 %

## 2020-11-18 DIAGNOSIS — G47.30 SLEEP APNEA IN ADULT: ICD-10-CM

## 2020-11-18 DIAGNOSIS — F41.8 MIXED ANXIETY AND DEPRESSIVE DISORDER: ICD-10-CM

## 2020-11-18 DIAGNOSIS — I25.10 CORONARY ARTERY DISEASE INVOLVING NATIVE CORONARY ARTERY OF NATIVE HEART WITHOUT ANGINA PECTORIS: ICD-10-CM

## 2020-11-18 DIAGNOSIS — R53.83 MALAISE AND FATIGUE: ICD-10-CM

## 2020-11-18 DIAGNOSIS — R53.81 MALAISE AND FATIGUE: ICD-10-CM

## 2020-11-18 DIAGNOSIS — J43.2 CENTRILOBULAR EMPHYSEMA (HCC): Primary | ICD-10-CM

## 2020-11-18 DIAGNOSIS — C34.91 NON-SMALL CELL CANCER OF RIGHT LUNG (HCC): ICD-10-CM

## 2020-11-18 PROCEDURE — 99214 OFFICE O/P EST MOD 30 MIN: CPT | Performed by: NURSE PRACTITIONER

## 2020-11-18 PROCEDURE — 3017F COLORECTAL CA SCREEN DOC REV: CPT | Performed by: NURSE PRACTITIONER

## 2020-11-18 PROCEDURE — G9717 DOC PT DX DEP/BP F/U NT REQ: HCPCS | Performed by: NURSE PRACTITIONER

## 2020-11-18 PROCEDURE — G9899 SCRN MAM PERF RSLTS DOC: HCPCS | Performed by: NURSE PRACTITIONER

## 2020-11-18 PROCEDURE — G8417 CALC BMI ABV UP PARAM F/U: HCPCS | Performed by: NURSE PRACTITIONER

## 2020-11-18 PROCEDURE — G8754 DIAS BP LESS 90: HCPCS | Performed by: NURSE PRACTITIONER

## 2020-11-18 PROCEDURE — G8752 SYS BP LESS 140: HCPCS | Performed by: NURSE PRACTITIONER

## 2020-11-18 PROCEDURE — G8427 DOCREV CUR MEDS BY ELIG CLIN: HCPCS | Performed by: NURSE PRACTITIONER

## 2020-11-18 RX ORDER — BUPROPION HYDROCHLORIDE 150 MG/1
150 TABLET ORAL
Qty: 90 TAB | Refills: 1 | Status: CANCELLED | OUTPATIENT
Start: 2020-11-18

## 2020-11-18 NOTE — PROGRESS NOTES
[facility] Note    Evangelina Aparicio is a 44 y.o. female who was seen in clinic today (11/18/2020). Subjective:  Cardiovascular Review:  The patient has hypertension, hyperlipidemia, coronary artery disease, history of prior MI (2005, 2009), peripheral vascular disease and status post coronary artery stenting (2005, 2009, 2020). Patient had atherectomy and angioplasty to right SFA and left popliteal artery (11/2017). LVEF: 43%. Seen by cardiology, Dr. Gato Nolasco. Diet and Lifestyle: generally follows a low fat low cholesterol diet, generally follows a low sodium diet, sedentary, nonsmoker  Home BP Monitoring: is not measured at home. Pertinent ROS: taking medications as instructed, no medication side effects noted, no TIA's, no chest pain on exertion, no dyspnea on exertion, no swelling of ankles. Lung Cancer  Patient has NSCLC (2015). She underwent chemotherapy. Also had an enlargement of subcarinal node. Underwent biopsy 1/2018; pathology showed adenoma. Treated with radiation therapy (4/2019). Repeat imaging shows stability of mass. Patient quit smoking in 1/2020, has 40 pack year history. Patient seen by oncology, Dr. Edenilson Raymond. Patient had a EGD in 1/2020 with Dr. Serina Johnson and was found to have esophageal stricture and ulcer. Currently taking PPI. Last colonoscopy 6/2015. Anxiety Review:  Patient is seen for anxiety disorder, panic attacks, sleep disturbance. Current treatment includes Lexapro, Xanax and no other therapies. Ongoing symptoms include: palpitations, insomnia, racing thoughts, psychomotor agitation, feelings of losing control, difficulty concentrating. Patient denies: chest pain, shortness of breath, suicidal ideation, homocidal ideation. Reported side effects from the treatment: none. Prior to Admission medications    Medication Sig Start Date End Date Taking?  Authorizing Provider   simvastatin (ZOCOR) 40 mg tablet TAKE 1 TABLET BY MOUTH EVERY NIGHT 11/17/20  Yes Seth Ta MD   ALPRAZolam (XANAX) 0.5 mg tablet Take 1 Tab by mouth two (2) times daily as needed for Anxiety. Max Daily Amount: 1 mg. 11/17/20  Yes Seth Ta MD   clopidogreL (PLAVIX) 75 mg tab TAKE 1 TABLET BY MOUTH DAILY 10/17/20  Yes Seth Ta MD   pantoprazole (PROTONIX) 40 mg tablet TAKE 1 TABLET BY MOUTH DAILY 9/2/20  Yes Seth Ta MD   gabapentin (NEURONTIN) 100 mg capsule TAKE ONE CAPSULE BY MOUTH IN THE MORNING 8/24/20  Yes Seth Ta MD   famotidine (PEPCID) 40 mg tablet TAKE 1 TABLET BY MOUTH EVERY DAY 8/22/20  Yes Seth Ta MD   lisinopriL (PRINIVIL, ZESTRIL) 2.5 mg tablet TAKE 1 TABLET BY MOUTH EVERY DAY 8/22/20  Yes Seth Ta MD   escitalopram oxalate (LEXAPRO) 20 mg tablet TAKE 1 TABLET BY MOUTH EVERY DAY AS NEEDED FOR ANXIETY 6/18/20  Yes Seth Ta MD   rOPINIRole (REQUIP) 0.5 mg tablet TAKE 1 TABLET BY MOUTH THREE TIMES DAILY 6/18/20  Yes Seth Ta MD   gabapentin (NEURONTIN) 300 mg capsule TAKE 1 CAPSULE BY MOUTH EVERY NIGHT AT BEDTIME 5/26/20  Yes Seth Ta MD   Spiriva with HandiHaler 18 mcg inhalation capsule INHALE THE CONTENTS OF 1 CAPSULE VIA INHALATION DEVICE DAILY 5/19/20  Yes Seth Ta MD   sucralfate (CARAFATE) 100 mg/mL suspension 1 g. 1/17/20  Yes Provider, Historical   nitroglycerin (NITROSTAT) 0.4 mg SL tablet 0.4 mg. Yes Provider, Historical   albuterol (PROVENTIL HFA, VENTOLIN HFA, PROAIR HFA) 90 mcg/actuation inhaler Take 2 Puffs by inhalation every six (6) hours as needed for Wheezing. 3/27/19  Yes Seth Ta MD   metoclopramide HCl (REGLAN) 5 mg tablet TAKE 1 TABLET BY MOUTH THREE TIMES DAILY BEFORE MEALS 11/20/20   Sophia Carballo MD   simvastatin (ZOCOR) 40 mg tablet Take 1 Tab by mouth nightly.  11/17/20   Sohpia Carballo MD          Allergies   Allergen Reactions    Lemon Oil Nausea and Vomiting    Amoxil [Amoxicillin] Diarrhea    Anoro Ellipta [Umeclidinium-Vilanterol] Rash    Codeine Nausea and Vomiting    Fluticasone-Umeclidin-Vilanter Rash    Metoprolol Nausea and Vomiting           ROS  See HPI    Objective:   Physical Exam  Vitals signs and nursing note reviewed. Constitutional:       Appearance: She is well-developed. Neck:      Musculoskeletal: Normal range of motion and neck supple. Thyroid: No thyromegaly. Vascular: No carotid bruit or JVD. Cardiovascular:      Rate and Rhythm: Normal rate and regular rhythm. Heart sounds: No murmur. No friction rub. No gallop. Pulmonary:      Effort: Pulmonary effort is normal. No respiratory distress. Breath sounds: Normal breath sounds. Lymphadenopathy:      Cervical: No cervical adenopathy. Neurological:      Mental Status: She is alert and oriented to person, place, and time. Psychiatric:         Behavior: Behavior normal.           Visit Vitals  /77 (BP 1 Location: Left arm, BP Patient Position: Sitting)   Pulse 95   Temp 97.7 °F (36.5 °C) (Temporal)   Resp 16   Ht 5' 4\" (1.626 m)   Wt 182 lb (82.6 kg)   SpO2 97%   BMI 31.24 kg/m²       Assessment & Plan:  Diagnoses and all orders for this visit:    1. Centrilobular emphysema [facility])  Request pulmonology evaluation and treatment.   -     REFERRAL TO PULMONARY DISEASE    2. Sleep apnea in adult  Request pulmonology evaluation and treatment.   -     REFERRAL TO PULMONARY DISEASE    3. Coronary artery disease involving native coronary artery of native heart without angina pectoris  Managed by cardiology, no changes.   -     METABOLIC PANEL, COMPREHENSIVE; Future  -     CBC WITH AUTOMATED DIFF; Future  -     LIPID PANEL; Future  -     TSH 3RD GENERATION; Future    4. Non-small cell cancer of right lung [facility])  Managed by oncology, no changes. 5. Mixed anxiety and depressive disorder  Stable, no changes to current therapy    6. Malaise and fatigue  Rule out endocrine, hematologic or metabolic etiology.    -     VITAMIN B12; Future  -     VITAMIN D, 25 HYDROXY; Future      I have discussed the diagnosis with the patient and the intended plan as seen in the above orders. The patient has received an after-visit summary along with patient information handout. I have discussed medication side effects and warnings with the patient as well. Follow-up and Dispositions    · Return in about 6 months (around 5/18/2021) for disease management.            Windle Landau, NP

## 2020-11-18 NOTE — PROGRESS NOTES
Chief Complaint   Patient presents with    Hypertension    Cholesterol Problem     1. Have you been to the ER, urgent care clinic since your last visit? Hospitalized since your last visit? No    2. Have you seen or consulted any other health care providers outside of the [address] [place] [address] since your last visit? Include any pap smears or colon screening.  Yes When: Dr Fermin Hsieh had stents placed

## 2020-11-20 RX ORDER — METOCLOPRAMIDE 5 MG/1
TABLET ORAL
Qty: 270 TAB | Refills: 0 | Status: SHIPPED | OUTPATIENT
Start: 2020-11-20 | End: 2021-02-10

## 2020-12-14 DIAGNOSIS — G25.81 RLS (RESTLESS LEGS SYNDROME): ICD-10-CM

## 2020-12-14 NOTE — TELEPHONE ENCOUNTER
Last Visit: 11/18/20  NP Zhang Olivares  Next Appointment: Not scheduled- due 5/2021  Previous Refill Encounter(s): 6/18/20  270 + 1    Requested Prescriptions     Pending Prescriptions Disp Refills    rOPINIRole (REQUIP) 0.5 mg tablet 270 Tab 1     Sig: TAKE 1 TABLET BY MOUTH THREE TIMES DAILY

## 2020-12-16 RX ORDER — ESCITALOPRAM OXALATE 20 MG/1
TABLET ORAL
Qty: 90 TAB | Refills: 1 | Status: SHIPPED | OUTPATIENT
Start: 2020-12-16 | End: 2021-10-02

## 2020-12-21 DIAGNOSIS — F41.9 ANXIETY: ICD-10-CM

## 2020-12-21 NOTE — LETTER
12/22/2020 5:16 PM 
 
Ms. Lyndia Sever [address] [phone] Please schedule a appointment in March to see DR. Carballo in clinic. I would schedule now since he books up. Xanax refills require in person visit every 3 months. Sincerely, Misael Franklin MD

## 2020-12-21 NOTE — TELEPHONE ENCOUNTER
Pt is upset that her ALPRAZolam Daryel Ham) 0.5 mg tablet has been rejected again and she recently saw Subhash Carnes. She really needs her meds and wants a nurse to call her back asap as to why this has happened.     BCB: [phone]

## 2020-12-21 NOTE — TELEPHONE ENCOUNTER
MD Carballo,    Patient request for refill. Check . Thanks, Vivek Hall    Last Visit: 11/18/20  NP Sebastian Alcantara  Next Appointment: Not scheduled- due 5/2021  Previous Refill Encounter(s): 11/17/20  60    Requested Prescriptions     Pending Prescriptions Disp Refills    ALPRAZolam (XANAX) 0.5 mg tablet 60 Tab 0     Sig: Take 1 Tab by mouth two (2) times daily as needed for Anxiety. Max Daily Amount: 1 mg.

## 2020-12-22 RX ORDER — ALPRAZOLAM 0.5 MG/1
0.5 TABLET ORAL
Qty: 60 TAB | Refills: 0 | Status: SHIPPED | OUTPATIENT
Start: 2020-12-22 | End: 2021-01-20

## 2020-12-22 NOTE — TELEPHONE ENCOUNTER
----- Message from David Mishra sent at 12/22/2020  9:22 AM EST -----  Regarding: Dr. Matthew Hale stated she has called twice to request refill for Xanax. She checked with [facility] and was told that they were to refuse refill request. Patient was seen in the practice by MONIQUE Godoy on last month. All medications were sent except the Xanax. Patient stated she is experiencing anxiety and depression and is requesting for medication to be called in today. MYRON:[phone].

## 2020-12-23 RX ORDER — ROPINIROLE 0.5 MG/1
TABLET, FILM COATED ORAL
Qty: 270 TAB | Refills: 1 | OUTPATIENT
Start: 2020-12-23

## 2021-01-19 DIAGNOSIS — F41.9 ANXIETY: ICD-10-CM

## 2021-01-19 DIAGNOSIS — G25.81 RLS (RESTLESS LEGS SYNDROME): ICD-10-CM

## 2021-01-20 RX ORDER — ALPRAZOLAM 0.5 MG/1
TABLET ORAL
Qty: 60 TAB | Refills: 0 | Status: SHIPPED | OUTPATIENT
Start: 2021-01-20 | End: 2021-02-22 | Stop reason: SDUPTHER

## 2021-01-20 RX ORDER — GABAPENTIN 300 MG/1
CAPSULE ORAL
Qty: 90 CAP | Refills: 0 | Status: SHIPPED | OUTPATIENT
Start: 2021-01-20 | End: 2021-04-23

## 2021-02-07 DIAGNOSIS — K21.9 GASTROESOPHAGEAL REFLUX DISEASE WITHOUT ESOPHAGITIS: ICD-10-CM

## 2021-02-10 RX ORDER — PANTOPRAZOLE SODIUM 40 MG/1
TABLET, DELAYED RELEASE ORAL
Qty: 90 TAB | Refills: 0 | Status: SHIPPED | OUTPATIENT
Start: 2021-02-10 | End: 2021-05-20 | Stop reason: SDUPTHER

## 2021-02-10 RX ORDER — FAMOTIDINE 40 MG/1
TABLET, FILM COATED ORAL
Qty: 90 TAB | Refills: 0 | Status: SHIPPED | OUTPATIENT
Start: 2021-02-10 | End: 2021-07-15

## 2021-02-10 RX ORDER — METOCLOPRAMIDE 5 MG/1
TABLET ORAL
Qty: 270 TAB | Refills: 0 | Status: SHIPPED | OUTPATIENT
Start: 2021-02-10 | End: 2021-07-15

## 2021-02-10 RX ORDER — LISINOPRIL 2.5 MG/1
TABLET ORAL
Qty: 90 TAB | Refills: 0 | Status: SHIPPED | OUTPATIENT
Start: 2021-02-10 | End: 2021-07-15

## 2021-02-22 ENCOUNTER — OFFICE VISIT (OUTPATIENT)
Dept: FAMILY MEDICINE CLINIC | Age: 51
End: 2021-02-22
Payer: MEDICARE

## 2021-02-22 VITALS
WEIGHT: 186 LBS | TEMPERATURE: 97.8 F | OXYGEN SATURATION: 98 % | DIASTOLIC BLOOD PRESSURE: 81 MMHG | RESPIRATION RATE: 18 BRPM | SYSTOLIC BLOOD PRESSURE: 115 MMHG | BODY MASS INDEX: 31.76 KG/M2 | HEART RATE: 76 BPM | HEIGHT: 64 IN

## 2021-02-22 DIAGNOSIS — C34.91 NON-SMALL CELL CANCER OF RIGHT LUNG (HCC): Primary | ICD-10-CM

## 2021-02-22 DIAGNOSIS — I73.9 PAD (PERIPHERAL ARTERY DISEASE) (HCC): ICD-10-CM

## 2021-02-22 DIAGNOSIS — J43.2 CENTRILOBULAR EMPHYSEMA (HCC): ICD-10-CM

## 2021-02-22 DIAGNOSIS — E78.00 HYPERCHOLESTEROLEMIA: ICD-10-CM

## 2021-02-22 DIAGNOSIS — F33.9 DEPRESSION, RECURRENT (HCC): ICD-10-CM

## 2021-02-22 DIAGNOSIS — F41.9 ANXIETY: ICD-10-CM

## 2021-02-22 DIAGNOSIS — Z13.31 POSITIVE DEPRESSION SCREENING: ICD-10-CM

## 2021-02-22 PROCEDURE — G8752 SYS BP LESS 140: HCPCS | Performed by: FAMILY MEDICINE

## 2021-02-22 PROCEDURE — G9899 SCRN MAM PERF RSLTS DOC: HCPCS | Performed by: FAMILY MEDICINE

## 2021-02-22 PROCEDURE — G9717 DOC PT DX DEP/BP F/U NT REQ: HCPCS | Performed by: FAMILY MEDICINE

## 2021-02-22 PROCEDURE — 99214 OFFICE O/P EST MOD 30 MIN: CPT | Performed by: FAMILY MEDICINE

## 2021-02-22 PROCEDURE — G8427 DOCREV CUR MEDS BY ELIG CLIN: HCPCS | Performed by: FAMILY MEDICINE

## 2021-02-22 PROCEDURE — G8754 DIAS BP LESS 90: HCPCS | Performed by: FAMILY MEDICINE

## 2021-02-22 PROCEDURE — G8417 CALC BMI ABV UP PARAM F/U: HCPCS | Performed by: FAMILY MEDICINE

## 2021-02-22 PROCEDURE — 3017F COLORECTAL CA SCREEN DOC REV: CPT | Performed by: FAMILY MEDICINE

## 2021-02-22 RX ORDER — ASPIRIN 81 MG/1
81 TABLET ORAL DAILY
COMMUNITY
Start: 2020-10-02

## 2021-02-22 RX ORDER — CLOPIDOGREL BISULFATE 75 MG/1
TABLET ORAL
Qty: 90 TAB | Refills: 1 | Status: SHIPPED | OUTPATIENT
Start: 2021-02-22 | End: 2021-08-06

## 2021-02-22 RX ORDER — ALPRAZOLAM 0.5 MG/1
TABLET ORAL
Qty: 60 TAB | Refills: 2 | Status: SHIPPED | OUTPATIENT
Start: 2021-02-22 | End: 2021-05-25 | Stop reason: SDUPTHER

## 2021-02-22 RX ORDER — SIMVASTATIN 40 MG/1
40 TABLET, FILM COATED ORAL
Qty: 90 TAB | Refills: 1 | Status: SHIPPED | OUTPATIENT
Start: 2021-02-22 | End: 2021-08-06

## 2021-02-22 NOTE — PROGRESS NOTES
Chief Complaint   Patient presents with    Hypertension     3 month follow up.  Cholesterol Problem    Depression     1. Have you been to the ER, urgent care clinic since your last visit? Hospitalized since your last visit? No    2. Have you seen or consulted any other health care providers outside of the [address] [place] [address] since your last visit? Include any pap smears or colon screening.  No

## 2021-02-22 NOTE — PROGRESS NOTES
HPI  Charmayne Milton Sewell 44 y.o. female  presents to the office today for chronic conditions. Blood pressure 115/81, pulse 76, temperature 97.8 °F (36.6 °C), temperature source Temporal, resp. rate 18, height 5' 4\" (1.626 m), weight 186 lb (84.4 kg), SpO2 98 %. Body mass index is 31.93 kg/m². Chief Complaint   Patient presents with    Hypertension     3 month follow up.  Cholesterol Problem    Depression        GERD: Stale. Pt continues with Protonix 40 mg/day, Reglan 5 mg/day, and Pepcid 40 mg/day. Anxiety/depresion: Pt reports she has not been doing well due to stress at home. She continues with Xanax 0.5 mg/BID and Lexapro 20 mg/day. Pt states she is interested in going to counseling as her home life is stressful. Hypercholesteremia: Lipid panel on 2/26/2020 notable for total cholesterol 165, HDL 60, LDL 79, and triglycerides 132. Pt continues with Zocor 40 mg/day. RLS: Pt continues with Requip 0.5 mg/day. Pt notes that in November, while on vacation, pt smoked a pack of cigarettes, which caused her to have extreme difficulty with breathing. Current Outpatient Medications   Medication Sig Dispense Refill    aspirin delayed-release 81 mg tablet 81 mg.      ALPRAZolam (XANAX) 0.5 mg tablet TAKE 1 TABLET BY MOUTH TWICE DAILY AS NEEDED FOR ANXIETY. MAX DAILY AMOUNT: 1 MG 60 Tab 2    clopidogreL (PLAVIX) 75 mg tab TAKE 1 TABLET BY MOUTH DAILY 90 Tab 1    simvastatin (ZOCOR) 40 mg tablet Take 1 Tab by mouth nightly.  90 Tab 1    lisinopriL (PRINIVIL, ZESTRIL) 2.5 mg tablet TAKE 1 TABLET BY MOUTH EVERY DAY 90 Tab 0    pantoprazole (PROTONIX) 40 mg tablet TAKE 1 TABLET BY MOUTH DAILY 90 Tab 0    famotidine (PEPCID) 40 mg tablet TAKE 1 TABLET BY MOUTH EVERY DAY 90 Tab 0    metoclopramide HCl (REGLAN) 5 mg tablet TAKE 1 TABLET BY MOUTH THREE TIMES DAILY BEFORE MEALS 270 Tab 0    gabapentin (NEURONTIN) 300 mg capsule TAKE 1 CAPSULE BY MOUTH EVERY NIGHT AT BEDTIME 90 Cap 0    escitalopram oxalate (LEXAPRO) 20 mg tablet TAKE 1 TABLET BY MOUTH EVERY DAY AS NEEDED FOR ANXIETY 90 Tab 1    gabapentin (NEURONTIN) 100 mg capsule TAKE ONE CAPSULE BY MOUTH IN THE MORNING 90 Cap 0    rOPINIRole (REQUIP) 0.5 mg tablet TAKE 1 TABLET BY MOUTH THREE TIMES DAILY 270 Tab 1    Spiriva with HandiHaler 18 mcg inhalation capsule INHALE THE CONTENTS OF 1 CAPSULE VIA INHALATION DEVICE DAILY 30 Cap 5    sucralfate (CARAFATE) 100 mg/mL suspension 1 g.      nitroglycerin (NITROSTAT) 0.4 mg SL tablet 0.4 mg.      albuterol (PROVENTIL HFA, VENTOLIN HFA, PROAIR HFA) 90 mcg/actuation inhaler Take 2 Puffs by inhalation every six (6) hours as needed for Wheezing.  1 Inhaler 5     Allergies   Allergen Reactions    Lemon Oil Nausea and Vomiting    Amoxil [Amoxicillin] Diarrhea    Anoro Ellipta [Umeclidinium-Vilanterol] Rash    Codeine Nausea and Vomiting    Fluticasone-Umeclidin-Vilanter Rash    Metoprolol Nausea and Vomiting     Past Medical History:   Diagnosis Date    Acute respiratory failure with hypoxia ([facility] 01/2020    Adenoma of right lung 2018    CAD (coronary artery disease)     stents 2005 and 2009    Depression     Esophageal ulcer 01/2020    GERD (gastroesophageal reflux disease)     Hypercholesterolemia     elevated particle number    Hypertension     MI (myocardial infarction) ([address] 2005, 2009    Non-small cell cancer of right lung ([facility] [facility] 08/2015    Osteoarthritis     PAD (peripheral artery disease) ([facility] [facility] 2017    TIA (transient ischemic attack)     Tobacco abuse 2/28/2014     Past Surgical History:   Procedure Laterality Date    HAND/FINGER SURGERY UNLISTED      left hand     HX CHOLECYSTECTOMY  2002    HX COLONOSCOPY  06/2015    HX ENDOSCOPY  2014, 2020    HX HEART CATHETERIZATION  2005, 2009    HX HIP REPLACEMENT Bilateral     HX PATRICIA AND BSO  2007        HX VASCULAR ANGIOPLASTY Bilateral 2017    right SFA, left politeal artery     Family History   Problem Relation Age of Onset    Cancer Mother         breast,ovarian colon     Hypertension Father     Cancer Maternal Grandmother         colon    Cancer Maternal Grandfather      Social History     Tobacco Use    Smoking status: Former Smoker     Packs/day: 1.00     Years: 40.00     Pack years: 40.00     Types: Cigarettes     Quit date: 2020     Years since quittin.1    Smokeless tobacco: Never Used   Substance Use Topics    Alcohol use: No        Review of Systems   Constitutional: Negative for chills and fever. HENT: Negative for hearing loss and tinnitus. Eyes: Negative for blurred vision and double vision. Respiratory: Negative for cough and shortness of breath. Cardiovascular: Negative for chest pain and palpitations. Gastrointestinal: Negative for nausea and vomiting. Genitourinary: Negative for dysuria and frequency. Musculoskeletal: Negative for back pain and falls. Skin: Negative for itching and rash. Neurological: Negative for dizziness, loss of consciousness and headaches. Endo/Heme/Allergies: Negative. Psychiatric/Behavioral: Negative for depression. The patient is not nervous/anxious. Physical Exam  Constitutional:       Appearance: Normal appearance. HENT:      Head: Normocephalic and atraumatic. Right Ear: Tympanic membrane, ear canal and external ear normal.      Left Ear: Tympanic membrane, ear canal and external ear normal.      Nose: Nose normal.      Mouth/Throat:      Mouth: Mucous membranes are moist.      Pharynx: Oropharynx is clear. Eyes:      Extraocular Movements: Extraocular movements intact. Conjunctiva/sclera: Conjunctivae normal.      Pupils: Pupils are equal, round, and reactive to light. Cardiovascular:      Rate and Rhythm: Normal rate and regular rhythm. Pulses: Normal pulses. Heart sounds: Normal heart sounds. Pulmonary:      Effort: Pulmonary effort is normal.      Breath sounds: Normal breath sounds.    Abdominal: General: Abdomen is flat. Bowel sounds are normal.      Palpations: Abdomen is soft. Genitourinary:     General: Normal vulva. Rectum: Normal.   Musculoskeletal: Normal range of motion. Skin:     General: Skin is warm and dry. Neurological:      General: No focal deficit present. Mental Status: She is alert and oriented to person, place, and time. Mental status is at baseline. Psychiatric:         Mood and Affect: Mood normal.         Behavior: Behavior normal.         Judgment: Judgment normal.           ASSESSMENT and PLAN  Diagnoses and all orders for this visit:    1. Non-small cell cancer of right lung [facility])  Assessment & Plan: This condition is managed by Specialist.      2. Centrilobular emphysema ([facility]  Assessment & Plan:  Stable, based on history, physical exam and review of pertinent labs, studies and medications; meds reconciled; continue current treatment plan, lifestyle modifications recommended. 3. Depression, recurrent ([facility]  Refer to #5. Assessment & Plan:  Stable, based on history, physical exam and review of pertinent labs, studies and medications; meds reconciled; continue current treatment plan. 4. PAD (peripheral artery disease) ([facility]  Assessment & Plan: This condition is managed by Specialist.    Orders:  -     clopidogreL (PLAVIX) 75 mg tab; TAKE 1 TABLET BY MOUTH DAILY    5. Anxiety  Pt reports she has not been doing well due to stress at home. She continues with Xanax 0.5 mg/BID and Lexapro 20 mg/day. Pt states she is interested in going to counseling as her home life is stressful.   -     ALPRAZolam (XANAX) 0.5 mg tablet; TAKE 1 TABLET BY MOUTH TWICE DAILY AS NEEDED FOR ANXIETY. MAX DAILY AMOUNT: 1 MG    6. Hypercholesterolemia  Lipid panel on 2/26/2020 notable for total cholesterol 165, HDL 60, LDL 79, and triglycerides 132. Pt continues with Zocor 40 mg/day. Pt requests a refill of their medication, which I have granted.   -     simvastatin (ZOCOR) 40 mg tablet; Take 1 Tab by mouth nightly. Follow-up and Dispositions    · Return in about 3 months (around 5/22/2021) for chronic follow up. Medication risks/benefits/costs/interactions/alternatives discussed with patient. Advised patient to call back or return to office if symptoms worsen/change/persist.  If patient cannot reach us or should anything more severe/urgent arise he/she should proceed directly to the nearest emergency department. Discussed expected course/resolution/complications of diagnosis in detail with patient. Patient given a written after visit summary which includes diagnoses, current medications and vitals. Patient expressed understanding with the diagnosis and plan. Written by Milburn Schlatter, scribe, as dictated by Marilynn Devries M.D.    2:20 PM - 2:41 PM    Total time spent with the patient 21 minutes, greater than 50% of time spent counseling patient.

## 2021-04-07 DIAGNOSIS — J44.9 CHRONIC OBSTRUCTIVE PULMONARY DISEASE, UNSPECIFIED COPD TYPE (HCC): ICD-10-CM

## 2021-04-07 RX ORDER — ALBUTEROL SULFATE 90 UG/1
2 AEROSOL, METERED RESPIRATORY (INHALATION)
Qty: 1 INHALER | Refills: 1 | Status: SHIPPED | OUTPATIENT
Start: 2021-04-07 | End: 2021-05-13 | Stop reason: SDUPTHER

## 2021-04-07 NOTE — TELEPHONE ENCOUNTER
----- Message from April DIANNE Dugan sent at 4/7/2021 11:04 AM EDT -----  Regarding: Patrick Severe, MD/telephone  General Message/Vendor Calls    Caller's first and last name:      Reason for call: Requested a call back       Callback required yes/no and why: Yes       Best contact number(s): [phone]      Details to clarify the request: Patient stated she takes albuterol inhaler and it's out of date. She needs her Albuterol to be refill but since it's out of date the pharmacy stated she needs to request a new prescription for it. So she would like for the doctor or his nurse to give her a call to get her albuterol fill.        April [address]

## 2021-05-05 RX ORDER — TIOTROPIUM BROMIDE 18 UG/1
CAPSULE ORAL; RESPIRATORY (INHALATION)
Qty: 30 CAP | Refills: 5 | Status: SHIPPED | OUTPATIENT
Start: 2021-05-05

## 2021-05-13 DIAGNOSIS — J44.9 CHRONIC OBSTRUCTIVE PULMONARY DISEASE, UNSPECIFIED COPD TYPE (HCC): ICD-10-CM

## 2021-05-13 NOTE — TELEPHONE ENCOUNTER
Patient is requesting 90 d/s rx. Thanks, Polo Arana    Last Visit: 2/22/21 MD Carballo  Next Appointment: Not re-scheduled- patient cancelled 5/25/21 appt. Previous Refill Encounter(s): 4/7/21 1 + 1    Requested Prescriptions     Pending Prescriptions Disp Refills    albuterol (PROVENTIL HFA, VENTOLIN HFA, PROAIR HFA) 90 mcg/actuation inhaler 3 Inhaler 0     Sig: Take 2 Puffs by inhalation every six (6) hours as needed for Wheezing.

## 2021-05-15 RX ORDER — ALBUTEROL SULFATE 90 UG/1
2 AEROSOL, METERED RESPIRATORY (INHALATION)
Qty: 3 INHALER | Refills: 0 | Status: SHIPPED | OUTPATIENT
Start: 2021-05-15 | End: 2021-08-02

## 2021-05-20 RX ORDER — PANTOPRAZOLE SODIUM 40 MG/1
TABLET, DELAYED RELEASE ORAL
Qty: 90 TABLET | Refills: 0 | Status: SHIPPED | OUTPATIENT
Start: 2021-05-20 | End: 2021-08-06

## 2021-05-20 NOTE — TELEPHONE ENCOUNTER
Last Visit: 2/22/21 MD Carballo  Next Appointment: Patient cancelled 5/25/21- Not rescheduled  Previous Refill Encounter(s): 2/10/21 90    Requested Prescriptions     Pending Prescriptions Disp Refills    pantoprazole (PROTONIX) 40 mg tablet 90 Tablet 0     Sig: Take 1 tablet by mouth daily

## 2021-05-25 ENCOUNTER — OFFICE VISIT (OUTPATIENT)
Dept: FAMILY MEDICINE CLINIC | Age: 51
End: 2021-05-25
Payer: MEDICARE

## 2021-05-25 VITALS
HEIGHT: 64 IN | OXYGEN SATURATION: 92 % | RESPIRATION RATE: 16 BRPM | HEART RATE: 86 BPM | BODY MASS INDEX: 31.76 KG/M2 | SYSTOLIC BLOOD PRESSURE: 120 MMHG | TEMPERATURE: 98.1 F | WEIGHT: 186 LBS | DIASTOLIC BLOOD PRESSURE: 75 MMHG

## 2021-05-25 DIAGNOSIS — Z12.11 COLON CANCER SCREENING: ICD-10-CM

## 2021-05-25 DIAGNOSIS — Z12.31 VISIT FOR SCREENING MAMMOGRAM: ICD-10-CM

## 2021-05-25 DIAGNOSIS — G25.81 RLS (RESTLESS LEGS SYNDROME): ICD-10-CM

## 2021-05-25 DIAGNOSIS — Z11.59 SCREENING FOR VIRAL DISEASE: ICD-10-CM

## 2021-05-25 DIAGNOSIS — C34.91 NON-SMALL CELL CANCER OF RIGHT LUNG (HCC): ICD-10-CM

## 2021-05-25 DIAGNOSIS — F41.9 ANXIETY: Primary | ICD-10-CM

## 2021-05-25 DIAGNOSIS — R53.81 MALAISE AND FATIGUE: ICD-10-CM

## 2021-05-25 DIAGNOSIS — Z13.220 LIPID SCREENING: ICD-10-CM

## 2021-05-25 DIAGNOSIS — R53.83 MALAISE AND FATIGUE: ICD-10-CM

## 2021-05-25 PROCEDURE — G8427 DOCREV CUR MEDS BY ELIG CLIN: HCPCS | Performed by: FAMILY MEDICINE

## 2021-05-25 PROCEDURE — G9899 SCRN MAM PERF RSLTS DOC: HCPCS | Performed by: FAMILY MEDICINE

## 2021-05-25 PROCEDURE — G8417 CALC BMI ABV UP PARAM F/U: HCPCS | Performed by: FAMILY MEDICINE

## 2021-05-25 PROCEDURE — 3017F COLORECTAL CA SCREEN DOC REV: CPT | Performed by: FAMILY MEDICINE

## 2021-05-25 PROCEDURE — G8752 SYS BP LESS 140: HCPCS | Performed by: FAMILY MEDICINE

## 2021-05-25 PROCEDURE — G9717 DOC PT DX DEP/BP F/U NT REQ: HCPCS | Performed by: FAMILY MEDICINE

## 2021-05-25 PROCEDURE — G8754 DIAS BP LESS 90: HCPCS | Performed by: FAMILY MEDICINE

## 2021-05-25 PROCEDURE — 99214 OFFICE O/P EST MOD 30 MIN: CPT | Performed by: FAMILY MEDICINE

## 2021-05-25 RX ORDER — ROPINIROLE 0.5 MG/1
TABLET, FILM COATED ORAL
Qty: 270 TABLET | Refills: 1 | Status: SHIPPED | OUTPATIENT
Start: 2021-05-25 | End: 2021-10-24

## 2021-05-25 RX ORDER — ALPRAZOLAM 0.5 MG/1
TABLET ORAL
Qty: 60 TABLET | Refills: 2 | Status: SHIPPED | OUTPATIENT
Start: 2021-05-25 | End: 2021-08-24 | Stop reason: SDUPTHER

## 2021-05-25 NOTE — PROGRESS NOTES
Chief Complaint   Patient presents with    Hypertension     1. Have you been to the ER, urgent care clinic since your last visit? Hospitalized since your last visit?no    2. Have you seen or consulted any other health care providers outside of the [address] [place] [address] since your last visit? Include any pap smears or colon screening.    Counselor       Pap no  Colonoscopy no

## 2021-05-25 NOTE — PROGRESS NOTES
HPI  Derrick Sewell 44 y.o. female  presents to the office today for hypertension. Blood pressure 120/75, pulse 86, temperature 98.1 °F (36.7 °C), temperature source Temporal, resp. rate 16, height 5' 4\" (1.626 m), weight 186 lb (84.4 kg), SpO2 92 %. Body mass index is 31.93 kg/m². Chief Complaint   Patient presents with    Hypertension        GERD: Pt continues with Protonix 40 mg/day and Reglan 5 mg/TID.     RLS: Pt continues with Neurontin 300 mg/day at night and Requip 0.5 mg/TID. Anxiety: Pt continues with Xanax 0.5 mg/BID PRN for anxiety and Lexapro 20 mg/day. Pt has been seeing an LCSW for this condition. Hyperlipidemia: Lipid panel on 2/26/2020 notable for total cholesterol 165, HDL 60, LDL 79, and triglycerides 132. Pt continues with Zocor 40 mg/day. PAD: Pt is under the care of cardiology for this condition. She continues with Plavix 75 mg/day. Hypertension: BP at office today 120/75. Pt continues with lisinopril 2.5 mg/day. Health maintenance: Pt has not received her COVID-19 vaccinations. Pt will have updated lab work performed during today's OV. Pt will undergo a one time hepatitis C screening during today's appointment. Pt is due for an updated colonoscopy; she will instead receive a FIT test to her home. Pt is due for an updated mammogram. Orders have been placed for an updated one to be performed. Pt informs me that her nephew recently adopted a dog. However, it has bitten her and scratched her multiple times. The bites are not deep, but due to pt's prescription for blood thinners, the wounds bleed heavily. Current Outpatient Medications   Medication Sig Dispense Refill    ALPRAZolam (XANAX) 0.5 mg tablet TAKE 1 TABLET BY MOUTH TWICE DAILY AS NEEDED FOR ANXIETY.  MAX DAILY AMOUNT: 1 MG 60 Tablet 2    rOPINIRole (REQUIP) 0.5 mg tablet TAKE 1 TABLET BY MOUTH THREE TIMES DAILY 270 Tablet 1    pantoprazole (PROTONIX) 40 mg tablet Take 1 tablet by mouth daily 90 Tablet 0    albuterol (PROVENTIL HFA, VENTOLIN HFA, PROAIR HFA) 90 mcg/actuation inhaler Take 2 Puffs by inhalation every six (6) hours as needed for Wheezing. 3 Inhaler 0    gabapentin (NEURONTIN) 300 mg capsule TAKE 1 CAPSULE BY MOUTH EVERY NIGHT AT BEDTIME 90 Cap 0    Spiriva with HandiHaler 18 mcg inhalation capsule INHALE THE CONTENTS OF 1 CAPSULE VIA INHALATION DEVICE DAILY 30 Cap 5    aspirin delayed-release 81 mg tablet 81 mg.      clopidogreL (PLAVIX) 75 mg tab TAKE 1 TABLET BY MOUTH DAILY 90 Tab 1    simvastatin (ZOCOR) 40 mg tablet Take 1 Tab by mouth nightly. 90 Tab 1    lisinopriL (PRINIVIL, ZESTRIL) 2.5 mg tablet TAKE 1 TABLET BY MOUTH EVERY DAY 90 Tab 0    famotidine (PEPCID) 40 mg tablet TAKE 1 TABLET BY MOUTH EVERY DAY 90 Tab 0    metoclopramide HCl (REGLAN) 5 mg tablet TAKE 1 TABLET BY MOUTH THREE TIMES DAILY BEFORE MEALS 270 Tab 0    escitalopram oxalate (LEXAPRO) 20 mg tablet TAKE 1 TABLET BY MOUTH EVERY DAY AS NEEDED FOR ANXIETY 90 Tab 1    gabapentin (NEURONTIN) 100 mg capsule TAKE ONE CAPSULE BY MOUTH IN THE MORNING 90 Cap 0    nitroglycerin (NITROSTAT) 0.4 mg SL tablet 0.4 mg.        Allergies   Allergen Reactions    Lemon Oil Nausea and Vomiting    Amoxil [Amoxicillin] Diarrhea    Anoro Ellipta [Umeclidinium-Vilanterol] Rash    Codeine Nausea and Vomiting    Fluticasone-Umeclidin-Vilanter Rash    Metoprolol Nausea and Vomiting     Past Medical History:   Diagnosis Date    [facility]    Adenoma of right lung 2018    CAD (coronary artery disease)     stents 2005 and 2009    Depression     Esophageal ulcer 01/2020    GERD (gastroesophageal reflux disease)     Hypercholesterolemia     elevated particle number    Hypertension     MI (myocardial infarction) ([address] 2005, 2009    Non-small cell cancer of right lung ([facility] 08/2015    Osteoarthritis     PAD (peripheral artery disease) ([facility] 2017    TIA (transient ischemic attack)  Tobacco abuse 2014     Past Surgical History:   Procedure Laterality Date    HAND/FINGER SURGERY UNLISTED      left hand     HX CHOLECYSTECTOMY  2002    HX COLONOSCOPY  2015    HX ENDOSCOPY  2020    HX HEART CATHETERIZATION  ,     HX HIP REPLACEMENT Bilateral     HX PATRICIA AND BSO          HX VASCULAR ANGIOPLASTY Bilateral 2017    right SFA, left politeal artery     Family History   Problem Relation Age of Onset    Cancer Mother         breast,ovarian colon     Hypertension Father     Cancer Maternal Grandmother         colon    Cancer Maternal Grandfather      Social History     Tobacco Use    Smoking status: Former Smoker     Packs/day: 1.00     Years: 40.00     Pack years: 40.00     Types: Cigarettes     Quit date: 2020     Years since quittin.3    Smokeless tobacco: Never Used   Substance Use Topics    Alcohol use: No        Review of Systems   Constitutional: Negative for chills and fever. HENT: Negative for hearing loss and tinnitus. Eyes: Negative for blurred vision and double vision. Respiratory: Negative for cough and shortness of breath. Cardiovascular: Negative for chest pain and palpitations. Gastrointestinal: Negative for nausea and vomiting. Genitourinary: Negative for dysuria and frequency. Musculoskeletal: Negative for back pain and falls. Skin: Negative for itching and rash. Neurological: Negative for dizziness, loss of consciousness and headaches. Endo/Heme/Allergies: Negative. Psychiatric/Behavioral: Negative for depression. The patient is not nervous/anxious. Physical Exam  Constitutional:       Appearance: Normal appearance. HENT:      Head: Normocephalic and atraumatic.       Right Ear: Tympanic membrane, ear canal and external ear normal.      Left Ear: Tympanic membrane, ear canal and external ear normal.      Nose: Nose normal.      Mouth/Throat:      Mouth: Mucous membranes are moist.      Pharynx: Oropharynx is clear. Eyes:      Extraocular Movements: Extraocular movements intact. Conjunctiva/sclera: Conjunctivae normal.      Pupils: Pupils are equal, round, and reactive to light. Cardiovascular:      Rate and Rhythm: Normal rate and regular rhythm. Pulses: Normal pulses. Heart sounds: Normal heart sounds. Pulmonary:      Effort: Pulmonary effort is normal.      Breath sounds: Normal breath sounds. Abdominal:      General: Abdomen is flat. Bowel sounds are normal.      Palpations: Abdomen is soft. Genitourinary:     General: Normal vulva. Rectum: Normal.   Musculoskeletal:         General: Normal range of motion. Skin:     General: Skin is warm and dry. Neurological:      General: No focal deficit present. Mental Status: She is alert and oriented to person, place, and time. Mental status is at baseline. Psychiatric:         Mood and Affect: Mood normal.         Behavior: Behavior normal.         Judgment: Judgment normal.           ASSESSMENT and PLAN  Diagnoses and all orders for this visit:    1. Visit for screening mammogram  Orders have been placed for pt to undergo an updated mammogram.   -     [facility] MAMMO BI SCREENING INCL CAD; Future    2. Anxiety  Pt continues with Xanax 0.5 mg/BID PRN for anxiety and Lexapro 20 mg/day. Pt has been seeing an LCSW for this condition.   -     ALPRAZolam (XANAX) 0.5 mg tablet; TAKE 1 TABLET BY MOUTH TWICE DAILY AS NEEDED FOR ANXIETY. MAX DAILY AMOUNT: 1 MG    3. RLS (restless legs syndrome)  Pt continues with Neurontin 300 mg/day at night and Requip 0.5 mg/TID.   -     rOPINIRole (REQUIP) 0.5 mg tablet; TAKE 1 TABLET BY MOUTH THREE TIMES DAILY    4. Malaise and fatigue  Pt will have updated lab work performed during today's OV.   -     T4, FREE; Future  -     TSH 3RD GENERATION; Future  -     CBC WITH AUTOMATED DIFF; Future    5. Non-small cell cancer of right lung (HCC)  Stable.  Pt is under the care of oncology for this condition at this time. 6. Lipid screening  Pt will have updated lab work performed during today's OV.   -     METABOLIC PANEL, COMPREHENSIVE; Future  -     LIPID PANEL; Future    7. Colon cancer screening  Pt is due for their colorectal cancer screening; I have placed orders for pt to receive a FIT test at their home. -     OCCULT BLOOD IMMUNOASSAY,DIAGNOSTIC; Future    8. Screening for viral disease  Pt will undergo a one time hepatitis C screening during today's appointment. -     HCV AB W/RFLX TO MARCUS; Future          Follow-up and Dispositions    · Return in about 3 months (around 8/25/2021) for follow up. Medication risks/benefits/costs/interactions/alternatives discussed with patient. Advised patient to call back or return to office if symptoms worsen/change/persist.  If patient cannot reach us or should anything more severe/urgent arise he/she should proceed directly to the nearest emergency department. Discussed expected course/resolution/complications of diagnosis in detail with patient. Patient given a written after visit summary which includes diagnoses, current medications and vitals. Patient expressed understanding with the diagnosis and plan. Written by tracey Philippe, as dictated by Amari Cullen M.D.    2:04 PM - 2:23 PM    Total time spent with the patient 19 minutes, greater than 50% of time spent counseling patient.

## 2021-06-22 DIAGNOSIS — G25.81 RLS (RESTLESS LEGS SYNDROME): ICD-10-CM

## 2021-06-25 RX ORDER — GABAPENTIN 100 MG/1
CAPSULE ORAL
Qty: 90 CAPSULE | Refills: 1 | Status: SHIPPED | OUTPATIENT
Start: 2021-06-25 | End: 2021-08-24 | Stop reason: DRUGHIGH

## 2021-07-15 DIAGNOSIS — K21.9 GASTROESOPHAGEAL REFLUX DISEASE WITHOUT ESOPHAGITIS: ICD-10-CM

## 2021-07-15 RX ORDER — FAMOTIDINE 40 MG/1
TABLET, FILM COATED ORAL
Qty: 90 TABLET | Refills: 0 | Status: SHIPPED | OUTPATIENT
Start: 2021-07-15 | End: 2021-10-13

## 2021-07-15 RX ORDER — LISINOPRIL 2.5 MG/1
TABLET ORAL
Qty: 90 TABLET | Refills: 0 | Status: SHIPPED | OUTPATIENT
Start: 2021-07-15 | End: 2021-10-13

## 2021-07-15 RX ORDER — METOCLOPRAMIDE 5 MG/1
TABLET ORAL
Qty: 270 TABLET | Refills: 0 | Status: SHIPPED | OUTPATIENT
Start: 2021-07-15 | End: 2021-10-13

## 2021-08-02 DIAGNOSIS — J44.9 CHRONIC OBSTRUCTIVE PULMONARY DISEASE, UNSPECIFIED COPD TYPE (HCC): ICD-10-CM

## 2021-08-02 RX ORDER — ALBUTEROL SULFATE 90 UG/1
AEROSOL, METERED RESPIRATORY (INHALATION)
Qty: 8.5 G | Refills: 5 | Status: SHIPPED | OUTPATIENT
Start: 2021-08-02 | End: 2021-11-15 | Stop reason: SDUPTHER

## 2021-08-06 DIAGNOSIS — E78.00 HYPERCHOLESTEROLEMIA: ICD-10-CM

## 2021-08-06 DIAGNOSIS — I73.9 PAD (PERIPHERAL ARTERY DISEASE) (HCC): ICD-10-CM

## 2021-08-06 RX ORDER — SIMVASTATIN 40 MG/1
TABLET, FILM COATED ORAL
Qty: 90 TABLET | Refills: 1 | Status: SHIPPED | OUTPATIENT
Start: 2021-08-06 | End: 2021-09-28

## 2021-08-06 RX ORDER — PANTOPRAZOLE SODIUM 40 MG/1
TABLET, DELAYED RELEASE ORAL
Qty: 90 TABLET | Refills: 0 | Status: SHIPPED | OUTPATIENT
Start: 2021-08-06 | End: 2021-09-09

## 2021-08-06 RX ORDER — CLOPIDOGREL BISULFATE 75 MG/1
TABLET ORAL
Qty: 90 TABLET | Refills: 1 | Status: SHIPPED | OUTPATIENT
Start: 2021-08-06 | End: 2022-02-11

## 2021-08-24 ENCOUNTER — OFFICE VISIT (OUTPATIENT)
Dept: FAMILY MEDICINE CLINIC | Age: 51
End: 2021-08-24
Payer: MEDICARE

## 2021-08-24 VITALS
DIASTOLIC BLOOD PRESSURE: 60 MMHG | HEART RATE: 78 BPM | RESPIRATION RATE: 16 BRPM | TEMPERATURE: 98.4 F | HEIGHT: 64 IN | SYSTOLIC BLOOD PRESSURE: 100 MMHG | BODY MASS INDEX: 29.02 KG/M2 | OXYGEN SATURATION: 95 % | WEIGHT: 170 LBS

## 2021-08-24 DIAGNOSIS — Z00.00 ENCOUNTER FOR MEDICARE ANNUAL WELLNESS EXAM: ICD-10-CM

## 2021-08-24 DIAGNOSIS — J18.9 PNEUMONIA DUE TO INFECTIOUS ORGANISM, UNSPECIFIED LATERALITY, UNSPECIFIED PART OF LUNG: Primary | ICD-10-CM

## 2021-08-24 DIAGNOSIS — F41.9 ANXIETY: ICD-10-CM

## 2021-08-24 DIAGNOSIS — G25.81 RLS (RESTLESS LEGS SYNDROME): ICD-10-CM

## 2021-08-24 PROCEDURE — G8754 DIAS BP LESS 90: HCPCS | Performed by: FAMILY MEDICINE

## 2021-08-24 PROCEDURE — G8752 SYS BP LESS 140: HCPCS | Performed by: FAMILY MEDICINE

## 2021-08-24 PROCEDURE — G9717 DOC PT DX DEP/BP F/U NT REQ: HCPCS | Performed by: FAMILY MEDICINE

## 2021-08-24 PROCEDURE — 99214 OFFICE O/P EST MOD 30 MIN: CPT | Performed by: FAMILY MEDICINE

## 2021-08-24 PROCEDURE — G8427 DOCREV CUR MEDS BY ELIG CLIN: HCPCS | Performed by: FAMILY MEDICINE

## 2021-08-24 PROCEDURE — G9899 SCRN MAM PERF RSLTS DOC: HCPCS | Performed by: FAMILY MEDICINE

## 2021-08-24 PROCEDURE — G0439 PPPS, SUBSEQ VISIT: HCPCS | Performed by: FAMILY MEDICINE

## 2021-08-24 PROCEDURE — 3017F COLORECTAL CA SCREEN DOC REV: CPT | Performed by: FAMILY MEDICINE

## 2021-08-24 PROCEDURE — G8417 CALC BMI ABV UP PARAM F/U: HCPCS | Performed by: FAMILY MEDICINE

## 2021-08-24 RX ORDER — BUDESONIDE 0.5 MG/2ML
500 INHALANT ORAL 2 TIMES DAILY
COMMUNITY
End: 2021-11-01 | Stop reason: SDUPTHER

## 2021-08-24 RX ORDER — IPRATROPIUM BROMIDE AND ALBUTEROL SULFATE 2.5; .5 MG/3ML; MG/3ML
3 SOLUTION RESPIRATORY (INHALATION)
COMMUNITY
End: 2021-10-21 | Stop reason: SDUPTHER

## 2021-08-24 RX ORDER — ALPRAZOLAM 0.5 MG/1
TABLET ORAL
Qty: 60 TABLET | Refills: 2 | Status: SHIPPED | OUTPATIENT
Start: 2021-08-24 | End: 2021-11-15 | Stop reason: SDUPTHER

## 2021-08-24 RX ORDER — GABAPENTIN 300 MG/1
CAPSULE ORAL
Qty: 90 CAPSULE | Refills: 0 | Status: SHIPPED | OUTPATIENT
Start: 2021-08-24 | End: 2022-01-31

## 2021-08-24 RX ORDER — DOXYCYCLINE 100 MG/1
100 CAPSULE ORAL
COMMUNITY
End: 2021-12-09 | Stop reason: ALTCHOICE

## 2021-08-24 NOTE — PROGRESS NOTES
Transitional Care Management Progress Note    Patient: Jonathan Marroquin  : 1970  PCP: Thuan Mas MD    Date of admission:   Date of discharge:     Patient was contacted by Transitional Care Management services within two days after her discharge: No. This encounter and supporting documentation was reviewed if available. Medication reconciliation was performed today (2021). Assessment/Plan:        Subjective:   Jonathan Marroquin is a 46 y.o. female presenting today for follow-up after being [facility].  The discharge summary was reviewed or requested. The main problem requiring admission was PNA. Complications during admission: none    Interval history/Current status: improving, better has come for [facility]    Admitting symptoms have: improved      Medications marked \"taking\" at this time:  Home Medications    Medication Sig Start Date End Date Taking? Authorizing Provider   albuterol-ipratropium (DUO-NEB) 2.5 mg-0.5 mg/3 ml nebu 3 mL by Nebulization route every six (6) hours as needed for Wheezing. Yes Provider, Historical   budesonide (PULMICORT) 0.5 mg/2 mL nbsp 500 mcg by Nebulization route two (2) times a day. Yes Provider, Historical   doxycycline (MONODOX) 100 mg capsule Take 100 mg by mouth.    Yes Provider, Historical   Oxygen At home 2 liters as needed and QHS   Yes Provider, Historical   clopidogreL (PLAVIX) 75 mg tab TAKE 1 TABLET BY MOUTH DAILY 21  Yes Thuan Mas MD   simvastatin (ZOCOR) 40 mg tablet TAKE 1 TABLET BY MOUTH EVERY NIGHT 21  Yes Thuan Mas MD   pantoprazole (PROTONIX) 40 mg tablet TAKE 1 TABLET BY MOUTH DAILY 21  Yes Thuan Mas MD   albuterol (PROVENTIL HFA, VENTOLIN HFA, PROAIR HFA) 90 mcg/actuation inhaler INHALE 2 PUFFS BY MOUTH EVERY 6 HOURS AS NEEDED FOR WHEEZING 21  Yes Thuan Mas MD   metoclopramide HCl (REGLAN) 5 mg tablet TAKE 1 TABLET BY MOUTH THREE TIMES DAILY BEFORE MEALS 7/15/21  Yes Lenward Bernheim, MD   famotidine (PEPCID) 40 mg tablet TAKE 1 TABLET BY MOUTH EVERY DAY 7/15/21  Yes Lenward Bernheim, MD   lisinopriL (PRINIVIL, ZESTRIL) 2.5 mg tablet TAKE 1 TABLET BY MOUTH EVERY DAY 7/15/21  Yes Lenward Bernheim, MD   gabapentin (NEURONTIN) 100 mg capsule TAKE 1 CAPSULE BY MOUTH EVERY MORNING 6/25/21  Yes Lenward Bernheim, MD   ALPRAZolam (XANAX) 0.5 mg tablet TAKE 1 TABLET BY MOUTH TWICE DAILY AS NEEDED FOR ANXIETY. MAX DAILY AMOUNT: 1 MG 5/25/21  Yes Hemant Carballo MD   rOPINIRole (REQUIP) 0.5 mg tablet TAKE 1 TABLET BY MOUTH THREE TIMES DAILY 5/25/21  Yes Lenward Bernheim, MD   gabapentin (NEURONTIN) 300 mg capsule TAKE 1 CAPSULE BY MOUTH EVERY NIGHT AT BEDTIME 5/12/21  Yes Lenward Bernheim, MD   Spiriva with HandiHaler 18 mcg inhalation capsule INHALE THE CONTENTS OF 1 CAPSULE VIA INHALATION DEVICE DAILY 5/5/21  Yes Lenward Bernheim, MD   aspirin delayed-release 81 mg tablet 81 mg. 10/2/20  Yes Provider, Historical   escitalopram oxalate (LEXAPRO) 20 mg tablet TAKE 1 TABLET BY MOUTH EVERY DAY AS NEEDED FOR ANXIETY 12/16/20  Yes Lenward Bernheim, MD   nitroglycerin (NITROSTAT) 0.4 mg SL tablet 0.4 mg. Yes Provider, Historical        Review of Systems:  Negative except some SOB with walking in the heat       Current Outpatient Medications   Medication Sig Dispense Refill    albuterol-ipratropium (DUO-NEB) 2.5 mg-0.5 mg/3 ml nebu 3 mL by Nebulization route every six (6) hours as needed for Wheezing.  budesonide (PULMICORT) 0.5 mg/2 mL nbsp 500 mcg by Nebulization route two (2) times a day.  doxycycline (MONODOX) 100 mg capsule Take 100 mg by mouth.       Oxygen At home 2 liters as needed and QHS      clopidogreL (PLAVIX) 75 mg tab TAKE 1 TABLET BY MOUTH DAILY 90 Tablet 1    simvastatin (ZOCOR) 40 mg tablet TAKE 1 TABLET BY MOUTH EVERY NIGHT 90 Tablet 1    pantoprazole (PROTONIX) 40 mg tablet TAKE 1 TABLET BY MOUTH DAILY 90 Tablet 0    albuterol (PROVENTIL HFA, VENTOLIN HFA, PROAIR HFA) 90 mcg/actuation inhaler INHALE 2 PUFFS BY MOUTH EVERY 6 HOURS AS NEEDED FOR WHEEZING 8.5 g 5    metoclopramide HCl (REGLAN) 5 mg tablet TAKE 1 TABLET BY MOUTH THREE TIMES DAILY BEFORE MEALS 270 Tablet 0    famotidine (PEPCID) 40 mg tablet TAKE 1 TABLET BY MOUTH EVERY DAY 90 Tablet 0    lisinopriL (PRINIVIL, ZESTRIL) 2.5 mg tablet TAKE 1 TABLET BY MOUTH EVERY DAY 90 Tablet 0    gabapentin (NEURONTIN) 100 mg capsule TAKE 1 CAPSULE BY MOUTH EVERY MORNING 90 Capsule 1    ALPRAZolam (XANAX) 0.5 mg tablet TAKE 1 TABLET BY MOUTH TWICE DAILY AS NEEDED FOR ANXIETY. MAX DAILY AMOUNT: 1 MG 60 Tablet 2    rOPINIRole (REQUIP) 0.5 mg tablet TAKE 1 TABLET BY MOUTH THREE TIMES DAILY 270 Tablet 1    gabapentin (NEURONTIN) 300 mg capsule TAKE 1 CAPSULE BY MOUTH EVERY NIGHT AT BEDTIME 90 Cap 0    Spiriva with HandiHaler 18 mcg inhalation capsule INHALE THE CONTENTS OF 1 CAPSULE VIA INHALATION DEVICE DAILY 30 Cap 5    aspirin delayed-release 81 mg tablet 81 mg.      escitalopram oxalate (LEXAPRO) 20 mg tablet TAKE 1 TABLET BY MOUTH EVERY DAY AS NEEDED FOR ANXIETY 90 Tab 1    nitroglycerin (NITROSTAT) 0.4 mg SL tablet 0.4 mg.        Allergies   Allergen Reactions    Lemon Oil Nausea and Vomiting    Amoxil [Amoxicillin] Diarrhea    Anoro Ellipta [Umeclidinium-Vilanterol] Rash    Codeine Nausea and Vomiting    Fluticasone-Umeclidin-Vilanter Rash    Metoprolol Nausea and Vomiting     Past Medical History:   Diagnosis Date    [facility] 01/2020    Adenoma of right lung 2018    CAD (coronary artery disease)     stents 2005 and 2009    Depression     Esophageal ulcer 01/2020    GERD (gastroesophageal reflux disease)     Hypercholesterolemia     elevated particle number    Hypertension     MI (myocardial infarction) ([facility] 2005, 2009    Non-small cell cancer of right lung ([facility] 08/2015    Osteoarthritis     PAD (peripheral artery disease) ([facility] 2017  TIA (transient ischemic attack)     Tobacco abuse 2014     Past Surgical History:   Procedure Laterality Date    HAND/FINGER SURGERY UNLISTED      left hand     HX CHOLECYSTECTOMY      HX COLONOSCOPY  2015    HX ENDOSCOPY  2020    HX HEART CATHETERIZATION  ,     HX HIP REPLACEMENT Bilateral     HX PATRICIA AND BSO          HX VASCULAR ANGIOPLASTY Bilateral 2017    right SFA, left politeal artery     Family History   Problem Relation Age of Onset    Cancer Mother         breast,ovarian colon     Hypertension Father     Cancer Maternal Grandmother         colon    Cancer Maternal Grandfather      Social History     Tobacco Use    Smoking status: Current Every Day Smoker     Packs/day: 1.00     Years: 40.00     Pack years: 40.00     Types: Cigarettes     Last attempt to quit: 2020     Years since quittin.6    Smokeless tobacco: Never Used   Substance Use Topics    Alcohol use: No          Objective:   /60 (BP 1 Location: Right upper arm, BP Patient Position: Sitting, BP Cuff Size: Large adult)   Pulse 78   Temp 98.4 °F (36.9 °C) (Temporal)   Resp 16   Ht 5' 4\" (1.626 m)   Wt 170 lb (77.1 kg)   SpO2 95%   BMI 29.18 kg/m²      Physical Examination: General appearance - alert, well appearing, and in no distress, oriented to person, place, and time and overweight  Eyes - pupils equal and reactive, extraocular eye movements intact  Ears - bilateral TM's and external ear canals normal  Nose - normal and patent, no erythema, discharge or polyps  Mouth - mucous membranes moist, pharynx normal without lesions  Neck - supple, no significant adenopathy  Chest - clear to auscultation, no wheezes, rales or rhonchi, symmetric air entry  Heart - normal rate, regular rhythm, normal S1, S2, no murmurs, rubs, clicks or gallops  Extremities - peripheral pulses normal, no pedal edema, no clubbing or cyanosis      We discussed the expected course, resolution and complications of the diagnosis(es) in detail. Medication risks, benefits, costs, interactions, and alternatives were discussed as indicated. I advised her to contact the office if her condition worsens, changes or fails to improve as anticipated. She expressed understanding with the diagnosis(es) and plan.      Alyson Arriaza MD

## 2021-08-24 NOTE — PROGRESS NOTES
Chief Complaint   Patient presents with    Annual Wellness Visit    Follow Up Chronic Condition    Pneumonia     using oxygen at home    [facility] Follow Up         1. Have you been to the ER, urgent care clinic since your last visit? Hospitalized since your last visit? Yes [address] for Pneumonia  [facility]     2. Have you seen or consulted any other health care providers outside of the [address] [place] [address] since your last visit? Include any pap smears or colon screening.    Pulmonary scheduled for September 7th   Pap- no   Colonoscopy - no  Mammogram - no

## 2021-08-24 NOTE — PROGRESS NOTES
This is the Subsequent Medicare Annual Wellness Exam, performed 12 months or more after the Initial AWV or the last Subsequent AWV    I have reviewed the patient's medical history in detail and updated the computerized patient record. Assessment/Plan   Education and counseling provided:  Are appropriate based on today's review and evaluation    1. Anxiety  -     ALPRAZolam (XANAX) 0.5 mg tablet; TAKE 1 TABLET BY MOUTH TWICE DAILY AS NEEDED FOR ANXIETY. MAX DAILY AMOUNT: 1 MG, Normal, Disp-60 Tablet, R-2Needs office visit for further refills  2. RLS (restless legs syndrome)  -     gabapentin (NEURONTIN) 300 mg capsule; Normal, Disp-90 Capsule, R-0       Depression Risk Factor Screening     3 most recent PHQ Screens 8/24/2021   Little interest or pleasure in doing things Not at all   Feeling down, depressed, irritable, or hopeless Not at all   Total Score PHQ 2 0   Trouble falling or staying asleep, or sleeping too much -   Feeling tired or having little energy -   Poor appetite, weight loss, or overeating -   Feeling bad about yourself - or that you are a failure or have let yourself or your family down -   Trouble concentrating on things such as school, work, reading, or watching TV -   Moving or speaking so slowly that other people could have noticed; or the opposite being so fidgety that others notice -   Thoughts of being better off dead, or hurting yourself in some way -   PHQ 9 Score -   How difficult have these problems made it for you to do your work, take care of your home and get along with others -       Alcohol Risk Screen    Do you average more than 1 drink per night or more than 7 drinks a week:  No    On any one occasion in the past three months have you have had more than 3 drinks containing alcohol:  No        Functional Ability and Level of Safety    Hearing: Hearing is good. Activities of Daily Living: The home contains: no safety equipment.   Patient does total self care Ambulation: with no difficulty     Fall Risk:  Fall Risk Assessment, last 12 mths 8/24/2021   Able to walk? Yes   Fall in past 12 months? 0   Do you feel unsteady?  0   Are you worried about falling 0      Abuse Screen:  Patient is not abused       Cognitive Screening    Has your family/caregiver stated any concerns about your memory: no         Health Maintenance Due     Health Maintenance Due   Topic Date Due    Hepatitis C Screening  Never done    COVID-19 Vaccine (1) Never done    Colorectal Cancer Screening Combo  Never done    Shingrix Vaccine Age 50> (1 of 2) Never done    Low dose CT lung screening  Never done    Breast Cancer Screen Mammogram  Never done    PAP AKA CERVICAL CYTOLOGY  02/08/2019    Lipid Screen  02/26/2021    Medicare Yearly Exam  08/21/2021       Patient Care Team   Patient Care Team:  Thuan Mas MD as PCP - General (Family Medicine)  Thuan Mas MD as PCP - [facility] Empaneled Provider    History     Patient Active Problem List   Diagnosis Code    GERD (gastroesophageal reflux disease) K21.9    Mixed anxiety and depressive disorder F41.8    CAD (coronary artery disease) I25.10    Hypercholesterolemia E78.00    Post traumatic stress disorder (PTSD) F43.10    S/P hip replacement Z96.649    Polycystic ovaries E28.2    Malignant neoplasm of upper lobe of right lung ([address] C34.11    HTN (hypertension), benign I10    Over weight E66.3    PAD (peripheral artery disease) ([address] I73.9    Non-small cell cancer of right lung ([address] C34.91    Centrilobular emphysema ([address] J43.2    Depression, recurrent ([address] F33.9     Past Medical History:   Diagnosis Date    Acute respiratory failure with hypoxia ([address] 01/2020    Adenoma of right lung 2018    CAD (coronary artery disease)     stents 2005 and 2009    Depression     Esophageal ulcer 01/2020    GERD (gastroesophageal reflux disease)     Hypercholesterolemia     elevated particle number    Hypertension     MI [facility]myocardial infarction) ([facility] 2005, 2009    [facility]-small [facility] 08/2015    Osteoarthritis     PAD (peripheral artery disease) ([facility] 2017    TIA (transient ischemic attack)     Tobacco abuse 2/28/2014      Past Surgical History:   Procedure Laterality Date    HAND/FINGER SURGERY UNLISTED      left hand     HX CHOLECYSTECTOMY  2002    HX COLONOSCOPY  06/2015    HX ENDOSCOPY  2014, 2020    HX HEART CATHETERIZATION  2005, 2009    HX HIP REPLACEMENT Bilateral     HX PATRICIA AND BSO  2007        HX VASCULAR ANGIOPLASTY Bilateral 2017    right SFA, left politeal artery     Current Outpatient Medications   Medication Sig Dispense Refill    albuterol-ipratropium (DUO-NEB) 2.5 mg-0.5 mg/3 ml nebu 3 mL by Nebulization route every six (6) hours as needed for Wheezing.  budesonide (PULMICORT) 0.5 mg/2 mL nbsp 500 mcg by Nebulization route two (2) times a day.  doxycycline (MONODOX) 100 mg capsule Take 100 mg by mouth.  Oxygen At home 2 liters as needed and QHS      clopidogreL (PLAVIX) 75 mg tab TAKE 1 TABLET BY MOUTH DAILY 90 Tablet 1    simvastatin (ZOCOR) 40 mg tablet TAKE 1 TABLET BY MOUTH EVERY NIGHT 90 Tablet 1    pantoprazole (PROTONIX) 40 mg tablet TAKE 1 TABLET BY MOUTH DAILY 90 Tablet 0    albuterol (PROVENTIL HFA, VENTOLIN HFA, PROAIR HFA) 90 mcg/actuation inhaler INHALE 2 PUFFS BY MOUTH EVERY 6 HOURS AS NEEDED FOR WHEEZING 8.5 g 5    metoclopramide HCl (REGLAN) 5 mg tablet TAKE 1 TABLET BY MOUTH THREE TIMES DAILY BEFORE MEALS 270 Tablet 0    famotidine (PEPCID) 40 mg tablet TAKE 1 TABLET BY MOUTH EVERY DAY 90 Tablet 0    lisinopriL (PRINIVIL, ZESTRIL) 2.5 mg tablet TAKE 1 TABLET BY MOUTH EVERY DAY 90 Tablet 0    gabapentin (NEURONTIN) 100 mg capsule TAKE 1 CAPSULE BY MOUTH EVERY MORNING 90 Capsule 1    ALPRAZolam (XANAX) 0.5 mg tablet TAKE 1 TABLET BY MOUTH TWICE DAILY AS NEEDED FOR ANXIETY.  MAX DAILY AMOUNT: 1 MG 60 Tablet 2    rOPINIRole (REQUIP) 0.5 mg tablet TAKE 1 TABLET BY MOUTH THREE TIMES DAILY 270 Tablet 1    gabapentin (NEURONTIN) 300 mg capsule TAKE 1 CAPSULE BY MOUTH EVERY NIGHT AT BEDTIME 90 Cap 0    Spiriva with HandiHaler 18 mcg inhalation capsule INHALE THE CONTENTS OF 1 CAPSULE VIA INHALATION DEVICE DAILY 30 Cap 5    aspirin delayed-release 81 mg tablet 81 mg.      escitalopram oxalate (LEXAPRO) 20 mg tablet TAKE 1 TABLET BY MOUTH EVERY DAY AS NEEDED FOR ANXIETY 90 Tab 1    nitroglycerin (NITROSTAT) 0.4 mg SL tablet 0.4 mg.        Allergies   Allergen Reactions    Lemon Oil Nausea and Vomiting    Amoxil [Amoxicillin] Diarrhea    Anoro Ellipta [Umeclidinium-Vilanterol] Rash    Codeine Nausea and Vomiting    Fluticasone-Umeclidin-Vilanter Rash    Metoprolol Nausea and Vomiting       Family History   Problem Relation Age of Onset    Cancer Mother         breast,ovarian colon     Hypertension Father     Cancer Maternal Grandmother         colon    Cancer Maternal Grandfather      Social History     Tobacco Use    Smoking status: Current Every Day Smoker     Packs/day: 1.00     Years: 40.00     Pack years: 40.00     Types: Cigarettes     Last attempt to quit: 2020     Years since quittin.6    Smokeless tobacco: Never Used   Substance Use Topics    Alcohol use: No         Hanford Cranker, MD Appropriate/Calm

## 2021-08-24 NOTE — PROGRESS NOTES
HPI  Velvet Sewell 46 y.o. female  presents to the office today for a hospital f/u and an Annual Wellness Visit. Blood pressure 100/60, pulse 78, temperature 98.4 °F (36.9 °C), temperature source Temporal, resp. rate 16, height 5' 4\" (1.626 m), weight 170 lb (77.1 kg), SpO2 95 %. Body mass index is 29.18 kg/m². Chief Complaint   Patient presents with    Annual Wellness Visit    Follow Up Chronic Condition    Pneumonia     using oxygen at home , Patient is smoking again   [facility] Follow Up      Anxiety: Pt has a h/o anxiety. She continues on Alprazolam 0.5mg BID PRN. Pt requests a refill of their medication, which I have granted. The Prescription Monitoring Program has been reviewed for recent activity regarding controlled substances for this patient. RLS: Pt has a h/o RLS. She continues on Gabapentin 100mg daily with relief of her sxs. Pt requests a refill of their medication, which I have granted. The Prescription Monitoring Program has been reviewed for recent activity regarding controlled substances for this patient. PT was admitted to [facility] on 08/13/21 and was dx'd with pneumonia in bilateral lungs by Dr. Jesus Mcfarland, Pulmonology. Pt was discharged on 08/17/21 and was rx'd Doxycycline, supplemental oxygen, and an Albuterol inhaler. She has a f/u with Dr. Gomez Vázquez, Pulmonology, on 09/07/21. She states that she feels somewhat better now, but is still not back to her baseline. She reports intermittent SOB. She states that she takes the supplemental oxygen at night and she is compliant with her medication regimen that she was rx'd by the hospital. Pt states that she was down to 1L oxygen and that her SpO2 dropped into the 80s. After this, a respiratory therapist put her backon 2L on oxygen. The pt reports that she is getting a chest X-Ray done tomorrow 08/25/21 and I have asked the pt to have her chiropractor fax the results of this X-Ray to my office.  The pt has requested a note for her work pending the results of the chest X-Ray. I have agreed to provide her with this letter. Health Maintenance: Medicare questionnaire discussed and responses reviewed today in office. Current Outpatient Medications   Medication Sig Dispense Refill    ALPRAZolam (XANAX) 0.5 mg tablet TAKE 1 TABLET BY MOUTH TWICE DAILY AS NEEDED FOR ANXIETY. MAX DAILY AMOUNT: 1 MG 60 Tablet 2    gabapentin (NEURONTIN) 300 mg capsule TAKE 1 CAPSULE BY MOUTH EVERY NIGHT AT BEDTIME 90 Capsule 0    albuterol-ipratropium (DUO-NEB) 2.5 mg-0.5 mg/3 ml nebu 3 mL by Nebulization route every six (6) hours as needed for Wheezing.  budesonide (PULMICORT) 0.5 mg/2 mL nbsp 500 mcg by Nebulization route two (2) times a day.  doxycycline (MONODOX) 100 mg capsule Take 100 mg by mouth.       Oxygen At home 2 liters as needed and QHS      clopidogreL (PLAVIX) 75 mg tab TAKE 1 TABLET BY MOUTH DAILY 90 Tablet 1    simvastatin (ZOCOR) 40 mg tablet TAKE 1 TABLET BY MOUTH EVERY NIGHT 90 Tablet 1    pantoprazole (PROTONIX) 40 mg tablet TAKE 1 TABLET BY MOUTH DAILY 90 Tablet 0    albuterol (PROVENTIL HFA, VENTOLIN HFA, PROAIR HFA) 90 mcg/actuation inhaler INHALE 2 PUFFS BY MOUTH EVERY 6 HOURS AS NEEDED FOR WHEEZING 8.5 g 5    metoclopramide HCl (REGLAN) 5 mg tablet TAKE 1 TABLET BY MOUTH THREE TIMES DAILY BEFORE MEALS 270 Tablet 0    famotidine (PEPCID) 40 mg tablet TAKE 1 TABLET BY MOUTH EVERY DAY 90 Tablet 0    lisinopriL (PRINIVIL, ZESTRIL) 2.5 mg tablet TAKE 1 TABLET BY MOUTH EVERY DAY 90 Tablet 0    rOPINIRole (REQUIP) 0.5 mg tablet TAKE 1 TABLET BY MOUTH THREE TIMES DAILY 270 Tablet 1    Spiriva with HandiHaler 18 mcg inhalation capsule INHALE THE CONTENTS OF 1 CAPSULE VIA INHALATION DEVICE DAILY 30 Cap 5    aspirin delayed-release 81 mg tablet 81 mg.      escitalopram oxalate (LEXAPRO) 20 mg tablet TAKE 1 TABLET BY MOUTH EVERY DAY AS NEEDED FOR ANXIETY 90 Tab 1    nitroglycerin (NITROSTAT) 0.4 mg SL tablet 0.4 mg.       Allergies   Allergen Reactions    Lemon Oil Nausea and Vomiting    Amoxil [Amoxicillin] Diarrhea    Anoro Ellipta [Umeclidinium-Vilanterol] Rash    Codeine Nausea and Vomiting    Fluticasone-Umeclidin-Vilanter Rash    Metoprolol Nausea and Vomiting     Past Medical History:   Diagnosis Date    Acute respiratory failure with hypoxia ([facility] [address] 2020    Adenoma of right lung 2018    CAD (coronary artery disease)     stents  and     Depression     Esophageal ulcer 2020    GERD (gastroesophageal reflux disease)     Hypercholesterolemia     elevated particle number    Hypertension     MI (myocardial infarction) ([facility] [address] ,     Non-small cell cancer of right lung ([facility] [address] 2015    Osteoarthritis     [facility]     TIA (transient ischemic attack)     Tobacco abuse 2014     Past Surgical History:   Procedure Laterality Date    HAND/FINGER SURGERY UNLISTED      left hand     HX CHOLECYSTECTOMY      HX COLONOSCOPY  2015    HX ENDOSCOPY  2020    HX HEART CATHETERIZATION  ,     HX HIP REPLACEMENT Bilateral     HX PATRICIA AND BSO          HX VASCULAR ANGIOPLASTY Bilateral 2017    right SFA, left politeal artery     Family History   Problem Relation Age of Onset    Cancer Mother         breast,ovarian colon     Hypertension Father     Cancer Maternal Grandmother         colon    Cancer Maternal Grandfather      Social History     Tobacco Use    Smoking status: Current Every Day Smoker     Packs/day: 1.00     Years: 40.00     Pack years: 40.00     Types: Cigarettes     Last attempt to quit: 2020     Years since quittin.6    Smokeless tobacco: Never Used   Substance Use Topics    Alcohol use: No        Review of Systems   Constitutional: Negative for chills and fever. HENT: Negative for hearing loss and tinnitus. Eyes: Negative for blurred vision and double vision.    Respiratory: Negative for cough and shortness of breath. Cardiovascular: Negative for chest pain and palpitations. Gastrointestinal: Negative for nausea and vomiting. Genitourinary: Negative for dysuria and frequency. Musculoskeletal: Negative for back pain and falls. Skin: Negative for itching and rash. Neurological: Negative for dizziness, loss of consciousness and headaches. Endo/Heme/Allergies: Negative. Psychiatric/Behavioral: Negative for depression. The patient is not nervous/anxious. Physical Exam  Constitutional:       Appearance: Normal appearance. HENT:      Head: Normocephalic and atraumatic. Right Ear: Tympanic membrane, ear canal and external ear normal.      Left Ear: Tympanic membrane, ear canal and external ear normal.      Nose: Nose normal.      Mouth/Throat:      Mouth: Mucous membranes are moist.      Pharynx: Oropharynx is clear. Eyes:      Extraocular Movements: Extraocular movements intact. Conjunctiva/sclera: Conjunctivae normal.      Pupils: Pupils are equal, round, and reactive to light. Cardiovascular:      Rate and Rhythm: Normal rate and regular rhythm. Pulses: Normal pulses. Heart sounds: Normal heart sounds. Pulmonary:      Effort: Pulmonary effort is normal.      Breath sounds: Normal breath sounds. Abdominal:      General: Abdomen is flat. Bowel sounds are normal.      Palpations: Abdomen is soft. Genitourinary:     General: Normal vulva. Rectum: Normal.   Musculoskeletal:         General: Normal range of motion. Skin:     General: Skin is warm and dry. Neurological:      General: No focal deficit present. Mental Status: She is alert and oriented to person, place, and time. Mental status is at baseline. Psychiatric:         Mood and Affect: Mood normal.         Behavior: Behavior normal.         Judgment: Judgment normal.           ASSESSMENT and PLAN  Diagnoses and all orders for this visit:    1.  Pneumonia due to infectious organism, unspecified laterality, unspecified part of lung  PT was admitted to [facility] on 08/13/21 and was dx'd with pneumonia in bilateral lungs by Dr. David Mendoza, Pulmonology. Pt was discharged on 08/17/21 and was rx'd Doxycycline, supplemental oxygen, and an Albuterol inhaler. She has a f/u with Dr. Dustin Royal, Pulmonology, on 09/07/21. She states that she feels somewhat better now, but is still not back to her baseline. She reports intermittent SOB. She states that she takes the supplemental oxygen at night and she is compliant with her medication regimen that she was rx'd by the hospital. Pt states that she was down to 1L oxygen and that her SpO2 dropped into the 80s. After this, a respiratory therapist put her backon 2L on oxygen. The pt reports that she is getting a chest X-Ray done tomorrow 08/25/21 and I have asked the pt to have her chiropractor fax the results of this X-Ray to my office. The pt has requested a note for her work pending the results of the chest X-Ray. I have agreed to provide her with this letter. 2. Anxiety  Pt has a h/o anxiety. She continues on Alprazolam 0.5mg BID PRN. Pt requests a refill of their medication, which I have granted. The Prescription Monitoring Program has been reviewed for recent activity regarding controlled substances for this patient. -     ALPRAZolam (XANAX) 0.5 mg tablet; TAKE 1 TABLET BY MOUTH TWICE DAILY AS NEEDED FOR ANXIETY. MAX DAILY AMOUNT: 1 MG    3. RLS (restless legs syndrome)  Pt has a h/o RLS. She continues on Gabapentin 100mg daily with relief of her sxs. Pt requests a refill of their medication, which I have granted. The Prescription Monitoring Program has been reviewed for recent activity regarding controlled substances for this patient.  -     gabapentin (NEURONTIN) 300 mg capsule; TAKE 1 CAPSULE BY MOUTH EVERY NIGHT AT BEDTIME    4.  Encounter for Medicare annual wellness exam  Medicare questionnaire discussed and responses reviewed today in office. Follow-up and Dispositions    · Return in about 3 months (around 11/24/2021) for follow up. Medication risks/benefits/costs/interactions/alternatives discussed with patient. Advised patient to call back or return to office if symptoms worsen/change/persist.  If patient cannot reach us or should anything more severe/urgent arise he/she should proceed directly to the nearest emergency department. Discussed expected course/resolution/complications of diagnosis in detail with patient. Patient given a written after visit summary which includes diagnoses, current medications and vitals. Patient expressed understanding with the diagnosis and plan. Written by tracey Jarvis, as dictated by Ariana Velazquez M.D.    10:14 AM - 10:33 AM    Total time spent with the patient 19 minutes, greater than 50% of time spent counseling patient.

## 2021-09-09 RX ORDER — PANTOPRAZOLE SODIUM 40 MG/1
TABLET, DELAYED RELEASE ORAL
Qty: 90 TABLET | Refills: 0 | Status: SHIPPED | OUTPATIENT
Start: 2021-09-09 | End: 2022-02-28

## 2021-09-28 DIAGNOSIS — E78.00 HYPERCHOLESTEROLEMIA: ICD-10-CM

## 2021-09-28 RX ORDER — SIMVASTATIN 40 MG/1
TABLET, FILM COATED ORAL
Qty: 90 TABLET | Refills: 0 | Status: SHIPPED | OUTPATIENT
Start: 2021-09-28 | End: 2021-10-06

## 2021-10-02 RX ORDER — ESCITALOPRAM OXALATE 20 MG/1
TABLET ORAL
Qty: 90 TABLET | Refills: 1 | Status: SHIPPED | OUTPATIENT
Start: 2021-10-02 | End: 2021-10-06

## 2021-10-06 DIAGNOSIS — E78.00 HYPERCHOLESTEROLEMIA: ICD-10-CM

## 2021-10-06 RX ORDER — ESCITALOPRAM OXALATE 20 MG/1
TABLET ORAL
Qty: 90 TABLET | Refills: 1 | Status: SHIPPED | OUTPATIENT
Start: 2021-10-06 | End: 2022-03-30

## 2021-10-06 RX ORDER — SIMVASTATIN 40 MG/1
TABLET, FILM COATED ORAL
Qty: 90 TABLET | Refills: 0 | Status: SHIPPED | OUTPATIENT
Start: 2021-10-06 | End: 2022-06-25

## 2021-10-12 NOTE — PROGRESS NOTES
Called patient Michelle Ovalle left voice message in regards to cervical cancer screening. Subjective   Patient ID: Donnie is a 49 year old male.  Referring provider is Kin Sheridan MD.    Chief Complaint   Patient presents with   • Follow-up     HPI    Patient is a 48 yo male here today for annual follow up. Has previously treated left MCA aneurysm s/p stent assisted coiling. He had post op stroke with a thrombosed stent that was successfully recanalized and his aphasia his continued to improve over this last year. Had CTA head done today for our review.     Denies new focal neurological deficit, new weakness or visual changes. Denies headaches. Endorses right hip pain with right thigh pain at times. Describes as shooting, tingling type pain.     Donnie has a past medical history of Aneurysm (CMS/HCC), Dizziness, Stomach acid, and Tinnitus.  Donnie has Cerebral aneurysm, nonruptured; Impaired mobility and activities of daily living; Aphasia; Cerebrovascular accident (CVA) due to occlusion of left middle cerebral artery (CMS/HCC); Syncope; Right groin pain; DVT prophylaxis; and Aneurysm (CMS/HCC) on their problem list.  Donnie has a past surgical history that includes carpal tunnel release; sinusitis caused by or presumed to be caused by bacterial infection; hb lumbar decompression; and Intra-Operative Monitoring, Neurologic (9/5/2020).  His family history includes Aneurysm in his father and mother.  Donnie reports that he has never smoked. He has never used smokeless tobacco.  Donnie has a current medication list which includes the following prescription(s): venlafaxine, fludrocortisone, midodrine, clopidogrel, flunisolide, omeprazole, and aspirin.  Donnie   Current Outpatient Medications   Medication Sig Dispense Refill   • venlafaxine (EFFEXOR) 37.5 MG tablet      • fludrocortisone (FLORINEF) 0.1 MG tablet Take 1 tablet by mouth daily. 90 tablet 1   • midodrine (PROAMATINE) 10 MG tablet Take 0.5 tablets by mouth 2 times daily. 180 tablet 1   • clopidogrel (Plavix) 75 MG tablet Take 1  tablet by mouth daily. 90 tablet 1   • flunisolide 25 MCG/ACT (0.025%) nasal spray Spray 2 sprays in each nostril 2 times daily.      • omeprazole (PriLOSEC) 40 MG capsule Take 40 mg by mouth.      • aspirin 325 MG tablet Take 1 tablet by mouth daily. 90 tablet 1     No current facility-administered medications for this visit.     Donnie is allergic to mold   (environmental), seasonal, and trees.    Review of Systems   Constitutional: Negative for fatigue and fever.   Eyes: Negative for photophobia and visual disturbance.   Musculoskeletal:        +right donato pain, radiates into thigh   Neurological: Negative for weakness and headaches.       Objective   Physical Exam  Vitals reviewed.   Constitutional:       Appearance: Normal appearance.   Eyes:      Pupils: Pupils are equal, round, and reactive to light.   Pulmonary:      Effort: Pulmonary effort is normal.   Skin:     Comments: Scabs/abrasion of right knee   Neurological:      Mental Status: He is alert and oriented to person, place, and time.      Cranial Nerves: No cranial nerve deficit.      Sensory: No sensory deficit.      Motor: No weakness.      Coordination: Coordination normal.      Gait: Gait normal.      Comments: Speech significantly improved   Psychiatric:         Mood and Affect: Mood normal.       Neurological Exam  Mental Status  Alert.    Cranial Nerves  CN III, IV, VI: Pupils equal round and reactive to light bilaterally.      Imaging/Labs  I have personally reviewed imaging/labs.   CTA head : 10/11/2021  IMPRESSION:  1.  Stable unchanged appearance of left M1 endovascular stent and residual  4 mm aneurysm when compared to the prior study.       Assessment   Problem List Items Addressed This Visit        Neuro    Cerebral aneurysm, nonruptured - Primary    Relevant Orders    CTA HEAD W WO CONTRAST      Other Visit Diagnoses     Hip pain        Relevant Orders    MRI PELVIS WO CONTRAST          Plan to obtain MRI pelvis to evaluate hip  pain--order provided.   Continue daily aspirin. Is OK to decrease to aspirin 81mg     Plan to follow up with annual aneurysm surveillance imaging next year (Oct. 2022) with repeat brain imaging CTA head for aneurysm follow up    Kin Sheridan MD

## 2021-10-13 DIAGNOSIS — K21.9 GASTROESOPHAGEAL REFLUX DISEASE WITHOUT ESOPHAGITIS: ICD-10-CM

## 2021-10-13 RX ORDER — LISINOPRIL 2.5 MG/1
TABLET ORAL
Qty: 90 TABLET | Refills: 0 | Status: SHIPPED | OUTPATIENT
Start: 2021-10-13 | End: 2021-12-30

## 2021-10-13 RX ORDER — FAMOTIDINE 40 MG/1
TABLET, FILM COATED ORAL
Qty: 90 TABLET | Refills: 0 | Status: SHIPPED | OUTPATIENT
Start: 2021-10-13 | End: 2021-12-30

## 2021-10-13 RX ORDER — METOCLOPRAMIDE 5 MG/1
TABLET ORAL
Qty: 270 TABLET | Refills: 0 | Status: SHIPPED | OUTPATIENT
Start: 2021-10-13 | End: 2021-12-30

## 2021-10-20 RX ORDER — DOXYCYCLINE 100 MG/1
100 TABLET ORAL 2 TIMES DAILY
Qty: 20 TABLET | Refills: 0 | Status: CANCELLED | OUTPATIENT
Start: 2021-10-20 | End: 2021-10-30

## 2021-10-20 NOTE — TELEPHONE ENCOUNTER
MD Carballo,    Patient call and is requesting call back pertaining to having symptoms of pneumonia again and requesting refill of doxycycline. [phone]    Entered new rx if appropriate. Thanks, Clari Elizabeth    Last Visit: 8/24/21 MD Carballo  Next Appointment: 11/30/21 MD Carballo  Previous Refill Encounter(s): 9/5/19 20 (hx of Adoxa but the Monodox is on her current med list)    Requested Prescriptions     Pending Prescriptions Disp Refills    doxycycline (ADOXA) 100 mg tablet 20 Tablet 0     Sig: Take 1 Tablet by mouth two (2) times a day for 10 days.

## 2021-10-20 NOTE — TELEPHONE ENCOUNTER
TC to Patient. ID verified. Called to advise DR. Sarika Palafox wants her to be seen. [facility] advised a provider needs to listen to her lungs and possibly get a CXR .  Or sh may go to [facility].

## 2021-10-21 RX ORDER — IPRATROPIUM BROMIDE AND ALBUTEROL SULFATE 2.5; .5 MG/3ML; MG/3ML
3 SOLUTION RESPIRATORY (INHALATION)
Qty: 120 ML | Refills: 1 | Status: SHIPPED | OUTPATIENT
Start: 2021-10-21 | End: 2021-11-15 | Stop reason: SDUPTHER

## 2021-10-21 NOTE — TELEPHONE ENCOUNTER
MD Carballo,    Patient call stating she went to [facility] and she does have pneumonia. They gave her doxycycline and a steroid. She needs albuterol/ipratropium nebs. Taking 4 times daily. Has rx on med list.  She only has 1 left! Thanks, Larissa Cespdees    Last Visit: 8/24/21 MD Carballo  Next Appointment: 10/26/21 MD Carballo  Previous Refill Encounter(s): historical provider    Requested Prescriptions     Pending Prescriptions Disp Refills    albuterol-ipratropium (DUO-NEB) 2.5 mg-0.5 mg/3 ml nebu 120 mL 1     Sig: 3 mL by Nebulization route every six (6) hours as needed for Wheezing.

## 2021-10-22 DIAGNOSIS — G25.81 RLS (RESTLESS LEGS SYNDROME): ICD-10-CM

## 2021-10-24 RX ORDER — ROPINIROLE 0.5 MG/1
TABLET, FILM COATED ORAL
Qty: 270 TABLET | Refills: 1 | Status: SHIPPED | OUTPATIENT
Start: 2021-10-24 | End: 2021-12-29

## 2021-11-01 ENCOUNTER — TELEPHONE (OUTPATIENT)
Dept: FAMILY MEDICINE CLINIC | Age: 51
End: 2021-11-01

## 2021-11-01 NOTE — TELEPHONE ENCOUNTER
Scheduled for Nov 15 at 4:45 pm VV  Has appointment for follow up with Pulmonary to have lung test.     Advised to continue nebs every 4 to 6 hours and plain mucinex to keep secretions loose and moving

## 2021-11-01 NOTE — TELEPHONE ENCOUNTER
Ela Page (Self) [phone] (H)     Pt would like refill of medications or an appointment right away. Pt has had phenomena for 4 or 5 months and wants the soonest appointment available or RX.  Please call back and advise

## 2021-11-02 RX ORDER — BUDESONIDE 0.5 MG/2ML
500 INHALANT ORAL 2 TIMES DAILY
Qty: 75 EACH | Refills: 5 | Status: SHIPPED | OUTPATIENT
Start: 2021-11-02 | End: 2022-02-21

## 2021-11-15 ENCOUNTER — VIRTUAL VISIT (OUTPATIENT)
Dept: FAMILY MEDICINE CLINIC | Age: 51
End: 2021-11-15
Payer: MEDICARE

## 2021-11-15 DIAGNOSIS — Z72.0 TOBACCO ABUSE: ICD-10-CM

## 2021-11-15 DIAGNOSIS — R06.02 SOB (SHORTNESS OF BREATH): ICD-10-CM

## 2021-11-15 DIAGNOSIS — J18.9 PNEUMONIA DUE TO INFECTIOUS ORGANISM, UNSPECIFIED LATERALITY, UNSPECIFIED PART OF LUNG: Primary | ICD-10-CM

## 2021-11-15 DIAGNOSIS — J44.9 CHRONIC OBSTRUCTIVE PULMONARY DISEASE, UNSPECIFIED COPD TYPE (HCC): ICD-10-CM

## 2021-11-15 DIAGNOSIS — Z12.31 SCREENING MAMMOGRAM, ENCOUNTER FOR: ICD-10-CM

## 2021-11-15 DIAGNOSIS — F41.9 ANXIETY: ICD-10-CM

## 2021-11-15 PROCEDURE — 3017F COLORECTAL CA SCREEN DOC REV: CPT | Performed by: FAMILY MEDICINE

## 2021-11-15 PROCEDURE — G9899 SCRN MAM PERF RSLTS DOC: HCPCS | Performed by: FAMILY MEDICINE

## 2021-11-15 PROCEDURE — G9717 DOC PT DX DEP/BP F/U NT REQ: HCPCS | Performed by: FAMILY MEDICINE

## 2021-11-15 PROCEDURE — 99213 OFFICE O/P EST LOW 20 MIN: CPT | Performed by: FAMILY MEDICINE

## 2021-11-15 PROCEDURE — G8756 NO BP MEASURE DOC: HCPCS | Performed by: FAMILY MEDICINE

## 2021-11-15 PROCEDURE — G8427 DOCREV CUR MEDS BY ELIG CLIN: HCPCS | Performed by: FAMILY MEDICINE

## 2021-11-15 RX ORDER — LEVOFLOXACIN 500 MG/1
500 TABLET, FILM COATED ORAL DAILY
Qty: 10 TABLET | Refills: 0 | Status: SHIPPED | OUTPATIENT
Start: 2021-11-15 | End: 2021-11-22 | Stop reason: DRUGHIGH

## 2021-11-15 RX ORDER — ALBUTEROL SULFATE 90 UG/1
AEROSOL, METERED RESPIRATORY (INHALATION)
Qty: 8.5 G | Refills: 5 | Status: SHIPPED | OUTPATIENT
Start: 2021-11-15 | End: 2022-01-02

## 2021-11-15 RX ORDER — IPRATROPIUM BROMIDE AND ALBUTEROL SULFATE 2.5; .5 MG/3ML; MG/3ML
3 SOLUTION RESPIRATORY (INHALATION)
Qty: 120 ML | Refills: 5 | Status: SHIPPED | OUTPATIENT
Start: 2021-11-15 | End: 2022-02-07

## 2021-11-15 RX ORDER — IPRATROPIUM BROMIDE AND ALBUTEROL SULFATE 2.5; .5 MG/3ML; MG/3ML
SOLUTION RESPIRATORY (INHALATION)
Qty: 90 ML | Refills: 5 | Status: SHIPPED | OUTPATIENT
Start: 2021-11-15 | End: 2022-05-05

## 2021-11-15 RX ORDER — ALPRAZOLAM 0.5 MG/1
TABLET ORAL
Qty: 60 TABLET | Refills: 2 | Status: SHIPPED | OUTPATIENT
Start: 2021-11-19 | End: 2022-02-11 | Stop reason: SDUPTHER

## 2021-11-15 NOTE — PROGRESS NOTES
Mk Rojas is a 46 y.o. female who was seen by synchronous (real-time) audio-video technology on 11/15/2021. Consent:  Services were provided through a video synchronous discussion virtually to substitute for in-person appointment. She and/or her healthcare decision maker is aware that this patient-initiated Telehealth encounter is a billable service, with coverage as determined by her insurance carrier. She is aware that she may receive a bill and has provided verbal consent to proceed: Yes    I was in the office while conducting this encounter. Subjective:   Liz Sewell was seen for Follow-up    Pneumonia: Pt has a MHx of Pneumonia and is currently on 2 liters of oxygen at home. I have rx'd the pt Levaquin 500mg take 1 tablet orally daily for 10 days. Anxiety: Pt is currently on Xanax 0.5mg take 1 tablet orally BID as needed for anxiety. Pt requests a refill of their medication, which I have granted. Max daily amount: 1MG. The Prescription Monitoring Program has been reviewed for recent activity regarding controlled substances for this patient. SOB: She is on DUO-NEB 2.5mg-0.5mg/3ml nebu take 3 mL by nebulization route every 6 hours as needed for Wheezing. Pt requests a refill of their medication, which I have granted. COPD: Pt has a MHx of COPD and is currently on Albuterol 90mcg/actuation inhale 2 puffs orally every 6 hours as needed for wheezing. Tobacco Cessation: The patient was counseled on the dangers of tobacco use, and was advised to quit. Reviewed strategies to maximize success, including removing cigarettes and smoking materials from environment, stress management and substitution of other forms of reinforcement. Health Maintenance:  Pt is due for an updated mammogram; orders have been placed for this imaging to be performed. Prior to Admission medications    Medication Sig Start Date End Date Taking?  Authorizing Provider   levoFLOXacin (LEVAQUIN) 500 mg tablet Take 1 Tablet by mouth daily for 10 days. 11/15/21 11/25/21 Yes Miquel Martinez MD   albuterol-ipratropium (DUO-NEB) 2.5 mg-0.5 mg/3 ml nebu 3 mL by Nebulization route every six (6) hours as needed for Wheezing. 11/15/21  Yes Miquel Martinez MD   albuterol (PROVENTIL HFA, VENTOLIN HFA, PROAIR HFA) 90 mcg/actuation inhaler INHALE 2 PUFFS BY MOUTH EVERY 6 HOURS AS NEEDED FOR WHEEZING 11/15/21  Yes Hemant Carballo MD   ALPRAZolam (XANAX) 0.5 mg tablet TAKE 1 TABLET BY MOUTH TWICE DAILY AS NEEDED FOR ANXIETY. MAX DAILY AMOUNT: 1 MG 11/19/21  Yes Miquel Martinez MD   budesonide (PULMICORT) 0.5 mg/2 mL nbsp 2 mL by Nebulization route two (2) times a day. 11/2/21  Yes Miquel Martinez MD   rOPINIRole (REQUIP) 0.5 mg tablet TAKE 1 TABLET BY MOUTH THREE TIMES DAILY 10/24/21  Yes Miquel Martinez MD   metoclopramide HCl (REGLAN) 5 mg tablet TAKE 1 TABLET BY MOUTH THREE TIMES DAILY BEFORE MEALS 10/13/21  Yes Miquel Martinez MD   famotidine (PEPCID) 40 mg tablet TAKE 1 TABLET BY MOUTH EVERY DAY 10/13/21  Yes Miquel Martinez MD   lisinopriL (PRINIVIL, ZESTRIL) 2.5 mg tablet TAKE 1 TABLET BY MOUTH EVERY DAY 10/13/21  Yes Miquel Martinez MD   simvastatin (ZOCOR) 40 mg tablet TAKE 1 TABLET BY MOUTH EVERY NIGHT 10/6/21  Yes Miquel Martinez MD   escitalopram oxalate (LEXAPRO) 20 mg tablet TAKE 1 TABLET BY MOUTH EVERY DAY AS NEEDED FOR ANXIETY 10/6/21  Yes Miquel Martinez MD   pantoprazole (PROTONIX) 40 mg tablet TAKE 1 TABLET BY MOUTH DAILY 9/9/21  Yes Miquel Martinez MD   gabapentin (NEURONTIN) 300 mg capsule TAKE 1 CAPSULE BY MOUTH EVERY NIGHT AT BEDTIME 8/24/21  Yes Miquel Martinez MD   doxycycline (MONODOX) 100 mg capsule Take 100 mg by mouth.    Yes Provider, Historical   Oxygen At home 2 liters as needed and QHS   Yes Provider, Historical   clopidogreL (PLAVIX) 75 mg tab TAKE 1 TABLET BY MOUTH DAILY 8/6/21  Yes Miquel Martinez MD   Spiriva with HandiHaler 18 mcg inhalation capsule INHALE THE CONTENTS OF 1 CAPSULE VIA INHALATION DEVICE DAILY 5/5/21  Yes Heron Lo MD   aspirin delayed-release 81 mg tablet 81 mg. 10/2/20  Yes Provider, Historical   nitroglycerin (NITROSTAT) 0.4 mg SL tablet 0.4 mg. Yes Provider, Historical   albuterol-ipratropium (DUO-NEB) 2.5 mg-0.5 mg/3 ml nebu 3 mL by Nebulization route every six (6) hours as needed for Wheezing. 10/21/21 11/15/21  Mary Carballo MD   ALPRAZolam (XANAX) 0.5 mg tablet TAKE 1 TABLET BY MOUTH TWICE DAILY AS NEEDED FOR ANXIETY.  MAX DAILY AMOUNT: 1 MG 8/24/21 11/15/21  Mary Carballo MD   albuterol (PROVENTIL HFA, VENTOLIN HFA, PROAIR HFA) 90 mcg/actuation inhaler INHALE 2 PUFFS BY MOUTH EVERY 6 HOURS AS NEEDED FOR WHEEZING 8/2/21 11/15/21  Mary Carballo MD     Allergies   Allergen Reactions    Lemon Oil Nausea and Vomiting    Amoxil [Amoxicillin] Diarrhea    Anoro Ellipta [Umeclidinium-Vilanterol] Rash    Codeine Nausea and Vomiting    Fluticasone-Umeclidin-Vilanter Rash    Metoprolol Nausea and Vomiting     Past Medical History:   Diagnosis Date    Acute respiratory failure with hypoxia ([address] 01/2020    Adenoma of right lung 2018    CAD (coronary artery disease)     stents 2005 and 2009    Depression     Esophageal ulcer 01/2020    GERD (gastroesophageal reflux disease)     Hypercholesterolemia     elevated particle number    Hypertension     MI (myocardial infarction) ([address] 2005, 2009    Non-small cell cancer of right lung ([address] 08/2015    Osteoarthritis     PAD (peripheral artery disease) ([address] 2017    TIA (transient ischemic attack)     Tobacco abuse 2/28/2014     Past Surgical History:   Procedure Laterality Date    HAND/FINGER SURGERY UNLISTED      left hand     HX CHOLECYSTECTOMY  2002    HX COLONOSCOPY  06/2015    HX ENDOSCOPY  2014, 2020    HX HEART CATHETERIZATION  2005, 2009    HX HIP REPLACEMENT Bilateral     HX PATRICIA AND BSO  2007        HX VASCULAR ANGIOPLASTY Bilateral 2017    right SFA, left politeal artery     Family History   Problem Relation Age of Onset    Cancer Mother         breast,ovarian colon     Hypertension Father     Cancer Maternal Grandmother         colon    Cancer Maternal Grandfather      Social History     Tobacco Use    Smoking status: Current Every Day Smoker     Packs/day: 1.00     Years: 40.00     Pack years: 40.00     Types: Cigarettes     Last attempt to quit: 2020     Years since quittin.8    Smokeless tobacco: Never Used   Substance Use Topics    Alcohol use: No        Review of Systems   Constitutional: Negative for chills and fever. HENT: Negative for hearing loss and tinnitus. Eyes: Negative for blurred vision and double vision. Respiratory: Negative for cough and shortness of breath. Cardiovascular: Negative for chest pain and palpitations. Gastrointestinal: Negative for nausea and vomiting. Genitourinary: Negative for dysuria and frequency. Musculoskeletal: Negative for back pain and falls. Skin: Negative for itching and rash. Neurological: Negative for dizziness, loss of consciousness and headaches. Endo/Heme/Allergies: Negative. Psychiatric/Behavioral: Negative for depression. The patient is not nervous/anxious. PHYSICAL EXAMINATION:  Vital Signs: (As obtained by patient/caregiver at home)  There were no vitals taken for this visit.      Constitutional: [x] Appears well-developed and well-nourished [x] No apparent distress      [] Abnormal -     Mental status: [x] Alert and awake  [x] Oriented to person/place/time [x] Able to follow commands    [] Abnormal -     Eyes:   EOM    [x]  Normal    [] Abnormal -   Sclera  [x]  Normal    [] Abnormal -          Discharge [x]  None visible   [] Abnormal -     HENT: [x] Normocephalic, atraumatic  [] Abnormal -   [x] Mouth/Throat: Mucous membranes are moist    External Ears [x] Normal  [] Abnormal -    Neck: [x] No visualized mass [] Abnormal -     Pulmonary/Chest: [x] Respiratory effort normal [x] No visualized signs of difficulty breathing or respiratory distress        [] Abnormal -      Musculoskeletal:   [x] Normal gait with no signs of ataxia         [x] Normal range of motion of neck        [] Abnormal -     Neurological:        [x] No Facial Asymmetry (Cranial nerve 7 motor function) (limited exam due to video visit)          [x] No gaze palsy        [] Abnormal -          Skin:        [x] No significant exanthematous lesions or discoloration noted on facial skin         [] Abnormal -            Psychiatric:       [x] Normal Affect [] Abnormal -        [x] No Hallucinations    Other pertinent observable physical exam findings:-    Assessment & Plan:   Diagnoses and all orders for this visit:    1. Pneumonia due to infectious organism, unspecified laterality, unspecified part of lung  Pt has a MHx of Pneumonia and is currently on 2 liters of oxygen at home. I have rx'd the pt Levaquin 500mg take 1 tablet orally daily for 10 days. -     levoFLOXacin (LEVAQUIN) 500 mg tablet; Take 1 Tablet by mouth daily for 10 days. 2. Anxiety  Pt is currently on Xanax 0.5mg take 1 tablet orally BID as needed for anxiety. Pt requests a refill of their medication, which I have granted. Max daily amount: 1MG. The Prescription Monitoring Program has been reviewed for recent activity regarding controlled substances for this patient. -     ALPRAZolam (XANAX) 0.5 mg tablet; TAKE 1 TABLET BY MOUTH TWICE DAILY AS NEEDED FOR ANXIETY. MAX DAILY AMOUNT: 1 MG    3. Screening mammogram, encounter for  Pt is due for an updated mammogram; orders have been placed for this imaging to be performed. -     [facility] MAMMO BI SCREENING INCL CAD; Future    4. Tobacco abuse  The patient was counseled on the dangers of tobacco use, and was advised to quit. Reviewed strategies to maximize success, including removing cigarettes and smoking materials from environment, stress management and substitution of other forms of reinforcement.     5. SOB (shortness of breath)  She is on DUO-NEB 2.5mg-0.5mg/3ml nebu take 3 mL by nebulization route every 6 hours as needed for Wheezing. Pt requests a refill of their medication, which I have granted. -     albuterol-ipratropium (DUO-NEB) 2.5 mg-0.5 mg/3 ml nebu; 3 mL by Nebulization route every six (6) hours as needed for Wheezing. 6. Chronic obstructive pulmonary disease, unspecified COPD type ([facility] [facility]  Pt has a MHx of COPD and is currently on Albuterol 90mcg/actuation inhale 2 puffs orally every 6 hours as needed for wheezing. Pt requests a refill of their medication, which I have granted. -     albuterol-ipratropium (DUO-NEB) 2.5 mg-0.5 mg/3 ml nebu; 3 mL by Nebulization route every six (6) hours as needed for Wheezing.  -     albuterol (PROVENTIL HFA, VENTOLIN HFA, PROAIR HFA) 90 mcg/actuation inhaler; INHALE 2 PUFFS BY MOUTH EVERY 6 HOURS AS NEEDED FOR WHEEZING      Follow-up and Dispositions    · Return in about 3 months (around 2/15/2022). We discussed the expected course, resolution and complications of the diagnosis(es) in detail. Medication risks, benefits, costs, interactions, and alternatives were discussed as indicated. I advised her to contact the office if her condition worsens, changes or fails to improve as anticipated. She expressed understanding with the diagnosis(es) and plan. Pursuant to the emergency declaration under the [employer] and [facility], 1135 waiver authority and the [employer] and [employer] General Act, this Virtual Visit was conducted, with patient's consent, to reduce the patient's risk of exposure to COVID-19 and provide continuity of care for an established patient. Services were provided through a video synchronous discussion virtually to substitute for in-person clinic visit.      Written by tracey Smith, as dictated by Irene Dominguez M.D.

## 2021-11-19 ENCOUNTER — TELEPHONE (OUTPATIENT)
Dept: FAMILY MEDICINE CLINIC | Age: 51
End: 2021-11-19

## 2021-11-19 DIAGNOSIS — J18.9 PNEUMONIA DUE TO INFECTIOUS ORGANISM, UNSPECIFIED LATERALITY, UNSPECIFIED PART OF LUNG: ICD-10-CM

## 2021-11-19 RX ORDER — LEVOFLOXACIN 500 MG/1
500 TABLET, FILM COATED ORAL DAILY
Qty: 10 TABLET | Refills: 0 | Status: CANCELLED | OUTPATIENT
Start: 2021-11-19 | End: 2021-11-29

## 2021-11-19 NOTE — TELEPHONE ENCOUNTER
Pt left a voice message requesting a call back from Dr. Adelaida Jackson nurse. Pt stated was prescribed the Levaquin 500 mg on 11/15/2021 and she does not believe the medication is working and would like to be prescribed something else. Please return pt call. BCN: [phone]      Requested Prescriptions     Pending Prescriptions Disp Refills    levoFLOXacin (LEVAQUIN) 500 mg tablet 10 Tablet 0     Sig: Take 1 Tablet by mouth daily for 10 days.

## 2021-11-22 DIAGNOSIS — J06.9 UPPER RESPIRATORY TRACT INFECTION, UNSPECIFIED TYPE: Primary | ICD-10-CM

## 2021-11-22 RX ORDER — CEPHALEXIN 500 MG/1
500 CAPSULE ORAL 4 TIMES DAILY
Qty: 40 CAPSULE | Refills: 0 | Status: SHIPPED | OUTPATIENT
Start: 2021-11-22 | End: 2021-12-02

## 2021-11-22 NOTE — TELEPHONE ENCOUNTER
TC to Patient ID verified. Called to stop Levaquin and start Keflex. RX has been sent to Pharmacy per DR. Carballo. May  today.

## 2021-11-30 ENCOUNTER — TELEPHONE (OUTPATIENT)
Dept: FAMILY MEDICINE CLINIC | Age: 51
End: 2021-11-30

## 2021-11-30 NOTE — TELEPHONE ENCOUNTER
MD Carballo,    Patient call stating the cephalexin 500mg is not working/no change for her pneumonia. Requesting another antibiotic.     Noted that she had appt today at 9:30am.      Thanks, Catina Agustin

## 2021-12-06 NOTE — TELEPHONE ENCOUNTER
Called, spoke to pt. Two pt identifiers confirmed. Patient stated that she is not feeling any better. Nurse informed MD and MD stated that patient has to come in building. Appointment schedule for Wednesday at 8 am with Maury Cruz.

## 2021-12-08 ENCOUNTER — OFFICE VISIT (OUTPATIENT)
Dept: FAMILY MEDICINE CLINIC | Age: 51
End: 2021-12-08

## 2021-12-08 VITALS
OXYGEN SATURATION: 93 % | SYSTOLIC BLOOD PRESSURE: 103 MMHG | RESPIRATION RATE: 16 BRPM | HEIGHT: 64 IN | HEART RATE: 108 BPM | BODY MASS INDEX: 27.59 KG/M2 | DIASTOLIC BLOOD PRESSURE: 70 MMHG | WEIGHT: 161.6 LBS | TEMPERATURE: 97.8 F

## 2021-12-08 DIAGNOSIS — J18.9 PNEUMONIA OF RIGHT LOWER LOBE DUE TO INFECTIOUS ORGANISM: ICD-10-CM

## 2021-12-08 DIAGNOSIS — I73.9 PAD (PERIPHERAL ARTERY DISEASE) (HCC): ICD-10-CM

## 2021-12-08 DIAGNOSIS — J44.0 CHRONIC OBSTRUCTIVE PULMONARY DISEASE WITH ACUTE LOWER RESPIRATORY INFECTION (HCC): Primary | ICD-10-CM

## 2021-12-08 PROCEDURE — 99215 OFFICE O/P EST HI 40 MIN: CPT | Performed by: NURSE PRACTITIONER

## 2021-12-08 PROCEDURE — G8752 SYS BP LESS 140: HCPCS | Performed by: NURSE PRACTITIONER

## 2021-12-08 PROCEDURE — G8427 DOCREV CUR MEDS BY ELIG CLIN: HCPCS | Performed by: NURSE PRACTITIONER

## 2021-12-08 PROCEDURE — G9717 DOC PT DX DEP/BP F/U NT REQ: HCPCS | Performed by: NURSE PRACTITIONER

## 2021-12-08 PROCEDURE — 3017F COLORECTAL CA SCREEN DOC REV: CPT | Performed by: NURSE PRACTITIONER

## 2021-12-08 PROCEDURE — G8417 CALC BMI ABV UP PARAM F/U: HCPCS | Performed by: NURSE PRACTITIONER

## 2021-12-08 PROCEDURE — G9899 SCRN MAM PERF RSLTS DOC: HCPCS | Performed by: NURSE PRACTITIONER

## 2021-12-08 PROCEDURE — G8754 DIAS BP LESS 90: HCPCS | Performed by: NURSE PRACTITIONER

## 2021-12-08 RX ORDER — FORMOTEROL FUMARATE 20 UG/2ML
20 SOLUTION RESPIRATORY (INHALATION) 2 TIMES DAILY
Qty: 30 EACH | Refills: 4 | Status: SHIPPED | OUTPATIENT
Start: 2021-12-08 | End: 2022-05-24

## 2021-12-08 NOTE — PROGRESS NOTES
[facility] [facility] Note     Shiloh Bhatia (: 1970) is a 46 y.o. female, established patient, here for evaluation of the following chief complaint(s):  Follow-up (pneumonia)       ASSESSMENT/PLAN:  1. Chronic obstructive pulmonary disease with acute lower respiratory infection (HCC)  -     XR CHEST PA LAT; Future  -Previously tried Trelegy and Anoro inhalers  - We discussed frequent use of duo nebs which are effective for her. I recommended a long-acting beta-2 agonist such as Brovana. Unfortunately Adonna Lota is not formulary and her insurance is recommending Perforomist.  This medication has been sent to the pharmacy with instructions for use. -Continue budesonide twice daily, reminder to rinse mouth after use given to patient  -Counseling advised on regular use of Spiriva and not intermittent to maximize its effectiveness   -Follow-up with pulmonary Dr. Laura Coffman  - ?? Pulmonary rehab candidate? ?  -This patient is at high risk for decompensation and rehospitalization. She will need close follow-up. Addendum: I was able to obtain her most recent CTA of the chest dated 2021. Results- new groundglass opacity within the right upper lobe which could really represent a developing bronchopneumonia versus a new focus of in situ malignancy follow-up CT should be formed in 6 months. Confluent right hilar/subcarinal soft tissue with marked bronchial wall thickening, unchanged from 2020. Pulmonary venous hypertension versus early interstitial pulmonary edema. 2. Pneumonia of right lower lobe due to infectious organism  -Continue cephalexin as prescribed.  -We discussed recurrent use of antibiotics and increased risk of C. difficile in which she is doing  -Repeat chest x-ray    3. PAD (peripheral artery disease) ([facility]  -I suspect her underlying leg weakness/fatigue is related to her peripheral arterial disease.   This was discussed with the patient during her encounter.    -We briefly discussed walking program for claudication symptoms however she does need to conserve her energy given her recurrent pneumonia and underlying COPD. NOTE: Jennifer Sims is requesting an increase in her gabapentin dose at night. She often will increase her dose to 600 mg at night. I have advised her to discuss this with her primary provider. Return in about 2 weeks (around 12/22/2021). SUBJECTIVE/OBJECTIVE:    Jennifer Sims is a 46 y.o. female seen today for recurrent pneumonia. Jennifer Sims has a significant history for COPD on nocturnal oxygen, tobacco use and PAD who has been diagnosed with recurrent pneumonia over the last 6 months. Her last hospitalization was in August of this year. She was evaluated on November 15 by her primary provider at this practice and was prescribed Levaquin. She completed her course of Levaquin but remains symptomatic. [facility] and most recently was prescribed cephalexin. Today, Ms. Sewell reports ongoing shortness of breath, hoarse productive cough, wheezing and weakness of the legs. She notes with minimal activity such as walking short distances she becomes more short of breath. She is utilizing her duo nebs approximately every 6 hours (715a,130-2p, 1/2-3/4 around 630-700pm). She is using Spiriva sporadically. She is wearing her supplemental oxygen at night as well as when she naps during the day. She is currently smoking about half a pack per day. Denies fever. Ms. Casper Batista notes that her appetite has decreased and believes she has lost about 30 pounds in the last 6 months. Jennifer Sims states her goal is to get stronger. She is followed by pulmonary Dr. Slick Chaney and does note that she does have pulmonary function testing scheduled in the near future. Review of Systems   Constitutional: Positive for activity change, appetite change and fatigue.  Negative for chills, diaphoresis and fever. HENT: Negative. Eyes: Negative. Respiratory: Positive for cough, shortness of breath and wheezing. Negative for stridor. Cardiovascular: Negative. Gastrointestinal: Negative. Genitourinary: Negative. Musculoskeletal: Negative for back pain and myalgias. Bilateral lower extremity leg weakness   Neurological: Negative. Wt Readings from Last 3 Encounters:   12/08/21 161 lb 9.6 oz (73.3 kg)   08/24/21 170 lb (77.1 kg)   05/25/21 186 lb (84.4 kg)     Temp Readings from Last 3 Encounters:   12/08/21 97.8 °F (36.6 °C) (Temporal)   08/24/21 98.4 °F (36.9 °C) (Temporal)   05/25/21 98.1 °F (36.7 °C) (Temporal)     BP Readings from Last 3 Encounters:   12/08/21 103/70   08/24/21 100/60   05/25/21 120/75     Pulse Readings from Last 3 Encounters:   12/08/21 (!) 108   08/24/21 78   05/25/21 86           PHYSICAL EXAMINATION:       General: Alert, cooperative, chronically ill-appearing  female  Neck: Supple, symmetrical, trachea midline, no adenopathy. No thyroid enlargement/tenderness/nodules  Respiratory:  Breath sounds noted to right upper lung fields posteriorly, diminished breath sounds in the right lower lobe, faint bilateral expiratory wheezing   Cardiovascular: Regular rate and rhythm, S1, S2 normal, no murmur, click, rub or gallop. MSK: Extremities normal appearing, atraumatic, no effusion. Skin: Skin color, texture, turgor normal. No rashes or lesions on exposed skin. Neurologic: A/O x3. Strength 5/5 grossly. Sensation normal throughout. Psychiatric: Normal affect. Mood euthymic. Thoughts logical. Speech volume and speed normal            Treatment risks/benefits/costs/interactions/alternatives discussed with patient.   Advised patient to call back or return to office if symptoms worsen/change/persist. If patient cannot reach us or should anything more severe/urgent arise he/she should proceed directly to the nearest emergency department. Discussed expected course/resolution/complications of diagnosis in detail with patient. Patient expressed understanding with the diagnosis and plan. An electronic signature was used to authenticate this note.   -- Mandeep Mcghee NP

## 2021-12-08 NOTE — PROGRESS NOTES
Chief Complaint   Patient presents with    Follow-up     pneumonia         1. \"Have you been to the ER, urgent care clinic since your last visit? Hospitalized since your last visit? \" No    2. \"Have you seen or consulted any other health care providers outside of the [address] [place] [address] since your last visit? \" No     3. For patients over 45: Has the patient had a colonoscopy? Yes, HM satisfied with blue hyperlink     If the patient is female:    4. For patients over 40: Has the patient had a mammogram? Yes, HM satisfied with blue hyperlink    5. For patients over 21: Has the patient had a pap smear?  Yes, HM satisfied with blue hyperlink     Pt declined flu shot      3 most recent PHQ Screens 11/15/2021   Little interest or pleasure in doing things Not at all   Feeling down, depressed, irritable, or hopeless Not at all   Total Score PHQ 2 0   Trouble falling or staying asleep, or sleeping too much -   Feeling tired or having little energy -   Poor appetite, weight loss, or overeating -   Feeling bad about yourself - or that you are a failure or have let yourself or your family down -   Trouble concentrating on things such as school, work, reading, or watching TV -   Moving or speaking so slowly that other people could have noticed; or the opposite being so fidgety that others notice -   Thoughts of being better off dead, or hurting yourself in some way -   PHQ 9 Score -   How difficult have these problems made it for you to do your work, take care of your home and get along with others -       Health Maintenance Due   Topic Date Due    Hepatitis C Screening  Never done    Pneumococcal 0-64 years (1 of 2 - PPSV23) Never done    Colorectal Cancer Screening Combo  Never done    Shingrix Vaccine Age 50> (1 of 2) Never done    Low dose CT lung screening  Never done    Breast Cancer Screen Mammogram  Never done    Lipid Screen  02/26/2021    Flu Vaccine (1) Never done

## 2021-12-08 NOTE — PATIENT INSTRUCTIONS
Formoterol (By breathing)   Formoterol (for-ROYA-ter-ol)  Prevents asthma attacks and treats other lung problems such as COPD. Also prevents exercise-induced bronchospasm (EIB). This medicine is a bronchodilator. Brand Name(s): Foradil Aerolizer, Perforomist   There may be other brand names for this medicine. When This Medicine Should Not Be Used:   Do not use this medicine if you have had an allergic reaction to formoterol. Do not use this medicine during an acute asthma attack or an acute COPD flare-up. Do not use this medicine by itself to treat asthma. You should only use formoterol for asthma if you also use another medicine to control your asthma. How to Use This Medicine:   Powder Under Pressure, Capsule, Liquid  · Your doctor will tell you how much medicine to use. Do not use more than directed. · You will use the capsule with a special inhaler device called an Aerolizer®. Do not swallow the capsule. Place the capsule into the chamber at the base of the Aerolizer® inhaler. This device opens the capsule and loads the powder medicine from the capsule into the air chamber. You then inhale the powder through the mouthpiece. Your caregiver will show you how to use your inhaler. · You will use the solution with an inhaler device called a nebulizer. The nebulizer turns the medicine into a fine mist that you breathe in through your mouth. Do not mix any other medicines with the solution. Your caregiver will show you how to use your nebulizer. · This medicine should come with a Medication Guide. Ask your pharmacist for a copy if you do not have one. If a dose is missed:   · If you miss a dose or forget to use your medicine, skip the missed dose and return to your regular dosing schedule. Do not take 2 doses at once. How to Store and Dispose of This Medicine:   · Keep the medicine in the foil pouch until you are ready to use it. Store at room temperature, away from heat and direct light. Do not freeze.  Keep the inhaler and the capsules dry. With dry hands, remove capsules from the pouch just before use. · Ask your pharmacist, doctor, or health caregiver about the best way to dispose of the used medicine container and any leftover medicine. You will also need to throw away old medicine after the expiration date has passed. · Keep all medicine out of the reach of children. Never share your medicine with anyone. Drugs and Foods to Avoid:   Ask your doctor or pharmacist before using any other medicine, including over-the-counter medicines, vitamins, and herbal products. · Make sure your doctor knows if you also use a steroid medicine (such as dexamethasone, prednisolone, prednisone, Medrol®), certain blood pressure medicines (such as atenolol, metoprolol, propranolol, Bystolic®, Tenormin®), or diuretics (water pills, such as furosemide, hydrochlorothiazide, Lasix®). Tell your doctor if you use medicine for depression (such as amitriptyline, fluoxetine, nortriptyline, Elavil®, Pamelor®, Prozac®, Sarafem®, Vivactil®), or an MAO inhibitor (such as Eldepryl®, Marplan®, Nardil®, Parnate®). · Make sure your doctor knows if you are also using arsenic trioxide (Trisenox®), dofetilide (Deatra Brown), theophylline ([place]®), certain antibiotics (such as azithromycin, clarithromycin, erythromycin, levofloxacin, moxifloxacin, sparfloxacin, Zithromax®), or medicine to treat mental illness (such as haloperidol, mesoridazine, pimozide, prochlorperazine, quetiapine, thioridazine, ziprasidone, Compazine®, Mellaril®). · This medicine should not be used together with similar inhaled medicines such as arformoterol, salmeterol/fluticasone, salmeterol, Advair®, or Serevent®.   Warnings While Using This Medicine:   · Make sure your doctor knows if you are pregnant or breastfeeding, or if you have heart or blood vessel disease (including aneurysm), heart rhythm problems, high blood pressure, angina (severe chest pain), adrenal gland problem (such as pheochromocytoma), diabetes, epilepsy or seizures, or thyroid problems. · This should not be the first and only medicine you use for asthma or COPD. This medicine will not stop an asthma attack that has already started. Your doctor may prescribe another medicine for an acute asthma attack or an acute COPD flare-up. · Use all of your asthma or COPD medicines as directed, including any corticosteroid medicine. Do not change your dose or stop using your medicines without asking your doctor. · This medicine may cause paradoxical bronchospasm, which means your breathing or wheezing will get worse. This may be life-threatening. Stop using this medicine and check with your doctor right away if you have coughing, trouble breathing, or wheezing. · The powder in the capsule contains trace amounts of milk proteins. Make sure your doctor knows if you have a severe milk protein allergy. · If any of your asthma medicines do not seem to be working as well as usual, call your doctor right away. Do not change your doses or stop using your medicines without asking your doctor. · Call your doctor if your symptoms do not improve or if they get worse. · Your doctor will check your progress and the effects of this medicine at regular visits. Keep all appointments.   Possible Side Effects While Using This Medicine:   Call your doctor right away if you notice any of these side effects:  · Allergic reaction: Itching or hives, swelling in your face or hands, swelling or tingling in your mouth or throat, chest tightness, trouble breathing  · Chest pain, trouble breathing  · Dry mouth, increased thirst, muscle cramps, nausea, vomiting  · Fast, pounding, or uneven heartbeat  · Lightheadedness, dizziness, fainting  · Seizures or tremors  · Unusual tiredness or weakness  · Worsening of breathing problems  If you notice these less serious side effects, talk with your doctor:   · Fever, headache, stuffy or runny nose, sore throat, sneezing  If you notice other side effects that you think are caused by this medicine, tell your doctor. Call your doctor for medical advice about side effects. You may report side effects to FDA at 5-147-XGP-0884  © 2017 [facility] Information is for End User's use only and may not be sold, redistributed or otherwise used for commercial purposes. The above information is an  only. It is not intended as medical advice for individual conditions or treatments. Talk to your doctor, nurse or pharmacist before following any medical regimen to see if it is safe and effective for you.

## 2021-12-09 ENCOUNTER — TELEPHONE (OUTPATIENT)
Dept: FAMILY MEDICINE CLINIC | Age: 51
End: 2021-12-09

## 2021-12-09 NOTE — TELEPHONE ENCOUNTER
Faxed and confirmed. ----- Message from Linnea Garcia NP sent at 12/9/2021 12:39 PM EST -----  Regarding: records request  Request records from  Pulmonary - Dr Renetta Flores, sure what is the correct swelling. I believe they are associated with pulmonary Associates.

## 2021-12-10 ENCOUNTER — TELEPHONE (OUTPATIENT)
Dept: FAMILY MEDICINE CLINIC | Age: 51
End: 2021-12-10

## 2021-12-10 NOTE — TELEPHONE ENCOUNTER
----- Message from Chary Fletcher NP sent at 12/9/2021  2:32 PM EST -----  Please inform the patient her x-ray still demonstrates findings of pneumonia in the right lung base. There is also mention by the radiologist of possible enlarged lymph nodes in the chest.  This correlates with her CT of the chest she had in August while in the hospital.    Continue with the plan as discussed with the patient yesterday.

## 2021-12-15 ENCOUNTER — TELEPHONE (OUTPATIENT)
Dept: FAMILY MEDICINE CLINIC | Age: 51
End: 2021-12-15

## 2021-12-15 NOTE — LETTER
12/22/2021 2:06 PM    Ms. Андрей Dickerson [id number]         To Whom it may concern:    Mrs. Sewell is under the care of . Currently due to chronic Health Issues she is only able to work 3 hours daily. If you have any additional forms that need completion related to this matter Please fax them Attention Dr. Kae Eastman [phone].                 Sincerely,      Genesis Craig MD

## 2021-12-15 NOTE — TELEPHONE ENCOUNTER
MONIQUE Johnson,    Patient call asking for a callback on her x-rays for pneumonia. She stated the refill line was the quickest way to leave a message. **She stated she is home today and can answer the phone.     [phone]    MONIQUE Johnson 12/8/21 OV

## 2021-12-22 NOTE — TELEPHONE ENCOUNTER
Called patient. Two patient identifiers verified. Discussed x-ray results per provider's note. Verbalized understanding. Pt said she has since gone to [facility] and had another x-ray done which showed no pneumonia but showed her COPD. Pt requested note for work be mailed to her that says she can only work 3 hours/day as she is on oxygen and does not take the oxygen to work so she gets too tired past 3 hours. Pt stated she is a .

## 2021-12-23 DIAGNOSIS — G25.81 RLS (RESTLESS LEGS SYNDROME): ICD-10-CM

## 2021-12-29 RX ORDER — ROPINIROLE 0.5 MG/1
TABLET, FILM COATED ORAL
Qty: 90 TABLET | Refills: 5 | Status: SHIPPED | OUTPATIENT
Start: 2021-12-29 | End: 2021-12-30

## 2021-12-30 DIAGNOSIS — K21.9 GASTROESOPHAGEAL REFLUX DISEASE WITHOUT ESOPHAGITIS: ICD-10-CM

## 2021-12-30 DIAGNOSIS — G25.81 RLS (RESTLESS LEGS SYNDROME): ICD-10-CM

## 2021-12-30 RX ORDER — METOCLOPRAMIDE 5 MG/1
TABLET ORAL
Qty: 270 TABLET | Refills: 0 | Status: SHIPPED | OUTPATIENT
Start: 2021-12-30 | End: 2022-03-30

## 2021-12-30 RX ORDER — FAMOTIDINE 40 MG/1
TABLET, FILM COATED ORAL
Qty: 90 TABLET | Refills: 0 | Status: SHIPPED | OUTPATIENT
Start: 2021-12-30 | End: 2022-03-30

## 2021-12-30 RX ORDER — LISINOPRIL 2.5 MG/1
TABLET ORAL
Qty: 90 TABLET | Refills: 0 | Status: SHIPPED | OUTPATIENT
Start: 2021-12-30 | End: 2022-03-30

## 2021-12-30 RX ORDER — ROPINIROLE 0.5 MG/1
TABLET, FILM COATED ORAL
Qty: 270 TABLET | Refills: 1 | Status: SHIPPED | OUTPATIENT
Start: 2021-12-30 | End: 2022-02-21

## 2022-01-02 DIAGNOSIS — J44.9 CHRONIC OBSTRUCTIVE PULMONARY DISEASE, UNSPECIFIED COPD TYPE (HCC): ICD-10-CM

## 2022-01-02 RX ORDER — ALBUTEROL SULFATE 90 UG/1
AEROSOL, METERED RESPIRATORY (INHALATION)
Qty: 8.5 G | Refills: 5 | Status: SHIPPED | OUTPATIENT
Start: 2022-01-02 | End: 2022-02-21 | Stop reason: SDUPTHER

## 2022-01-05 ENCOUNTER — TELEPHONE (OUTPATIENT)
Dept: FAMILY MEDICINE CLINIC | Age: 52
End: 2022-01-05

## 2022-01-30 DIAGNOSIS — G25.81 RLS (RESTLESS LEGS SYNDROME): ICD-10-CM

## 2022-01-31 RX ORDER — GABAPENTIN 300 MG/1
CAPSULE ORAL
Qty: 90 CAPSULE | Refills: 0 | Status: SHIPPED | OUTPATIENT
Start: 2022-01-31 | End: 2022-02-21 | Stop reason: SDUPTHER

## 2022-02-04 DIAGNOSIS — J44.9 CHRONIC OBSTRUCTIVE PULMONARY DISEASE, UNSPECIFIED COPD TYPE (HCC): ICD-10-CM

## 2022-02-04 DIAGNOSIS — R06.02 SOB (SHORTNESS OF BREATH): ICD-10-CM

## 2022-02-07 RX ORDER — IPRATROPIUM BROMIDE AND ALBUTEROL SULFATE 2.5; .5 MG/3ML; MG/3ML
SOLUTION RESPIRATORY (INHALATION)
Qty: 90 ML | Refills: 5 | Status: SHIPPED | OUTPATIENT
Start: 2022-02-07 | End: 2022-05-20

## 2022-02-11 DIAGNOSIS — I73.9 PAD (PERIPHERAL ARTERY DISEASE) (HCC): ICD-10-CM

## 2022-02-11 DIAGNOSIS — F41.9 ANXIETY: ICD-10-CM

## 2022-02-11 RX ORDER — CLOPIDOGREL BISULFATE 75 MG/1
TABLET ORAL
Qty: 90 TABLET | Refills: 1 | Status: SHIPPED | OUTPATIENT
Start: 2022-02-11 | End: 2022-05-24 | Stop reason: SDUPTHER

## 2022-02-11 NOTE — TELEPHONE ENCOUNTER
MD Carballo,    Patient call today stating her father, MARBIN Sewell,  has passed away this morning. She is asking for you/nurse to call her asap.   [phone]  Thanks, Jonathan Mak

## 2022-02-11 NOTE — TELEPHONE ENCOUNTER
TC to Patient condolences given for loss. Patient advised I will inform DR. Carballo about his passing. Patient advises they will be using [facility].     Patient is requesting RF on Xanax

## 2022-02-12 RX ORDER — ALPRAZOLAM 0.5 MG/1
TABLET ORAL
Qty: 60 TABLET | Refills: 2 | Status: SHIPPED | OUTPATIENT
Start: 2022-02-12 | End: 2022-05-03

## 2022-02-21 ENCOUNTER — VIRTUAL VISIT (OUTPATIENT)
Dept: FAMILY MEDICINE CLINIC | Age: 52
End: 2022-02-21
Payer: MEDICARE

## 2022-02-21 DIAGNOSIS — J44.0 CHRONIC OBSTRUCTIVE PULMONARY DISEASE WITH ACUTE LOWER RESPIRATORY INFECTION (HCC): ICD-10-CM

## 2022-02-21 DIAGNOSIS — C34.91 NON-SMALL CELL CANCER OF RIGHT LUNG (HCC): ICD-10-CM

## 2022-02-21 DIAGNOSIS — F33.9 DEPRESSION, RECURRENT (HCC): ICD-10-CM

## 2022-02-21 DIAGNOSIS — R06.02 SOB (SHORTNESS OF BREATH): ICD-10-CM

## 2022-02-21 DIAGNOSIS — F41.9 ANXIETY: Primary | ICD-10-CM

## 2022-02-21 DIAGNOSIS — J44.9 CHRONIC OBSTRUCTIVE PULMONARY DISEASE, UNSPECIFIED COPD TYPE (HCC): ICD-10-CM

## 2022-02-21 DIAGNOSIS — I73.9 PAD (PERIPHERAL ARTERY DISEASE) (HCC): ICD-10-CM

## 2022-02-21 DIAGNOSIS — G25.81 RLS (RESTLESS LEGS SYNDROME): ICD-10-CM

## 2022-02-21 PROCEDURE — G8427 DOCREV CUR MEDS BY ELIG CLIN: HCPCS | Performed by: FAMILY MEDICINE

## 2022-02-21 PROCEDURE — G8756 NO BP MEASURE DOC: HCPCS | Performed by: FAMILY MEDICINE

## 2022-02-21 PROCEDURE — 99214 OFFICE O/P EST MOD 30 MIN: CPT | Performed by: FAMILY MEDICINE

## 2022-02-21 PROCEDURE — G9899 SCRN MAM PERF RSLTS DOC: HCPCS | Performed by: FAMILY MEDICINE

## 2022-02-21 PROCEDURE — G9717 DOC PT DX DEP/BP F/U NT REQ: HCPCS | Performed by: FAMILY MEDICINE

## 2022-02-21 PROCEDURE — 3017F COLORECTAL CA SCREEN DOC REV: CPT | Performed by: FAMILY MEDICINE

## 2022-02-21 RX ORDER — GABAPENTIN 300 MG/1
CAPSULE ORAL
Qty: 270 CAPSULE | Refills: 2 | Status: SHIPPED | OUTPATIENT
Start: 2022-02-21 | End: 2022-05-24 | Stop reason: SDUPTHER

## 2022-02-21 RX ORDER — DOXYCYCLINE 100 MG/1
100 TABLET ORAL 2 TIMES DAILY
Qty: 20 TABLET | Refills: 0 | Status: SHIPPED | OUTPATIENT
Start: 2022-02-21 | End: 2022-03-03

## 2022-02-21 RX ORDER — ALBUTEROL SULFATE 90 UG/1
2 AEROSOL, METERED RESPIRATORY (INHALATION)
Qty: 8.5 G | Refills: 5 | Status: SHIPPED | OUTPATIENT
Start: 2022-02-21 | End: 2022-06-28 | Stop reason: SDUPTHER

## 2022-02-21 NOTE — PROGRESS NOTES
Jackelin Brown is a 46 y.o. female who was seen by synchronous (real-time) audio-video technology on 2/21/2022. Consent:  Services were provided through a video synchronous discussion virtually to substitute for in-person appointment. She and/or her healthcare decision maker is aware that this patient-initiated Telehealth encounter is a billable service, with coverage as determined by her insurance carrier. She is aware that she may receive a bill and has provided verbal consent to proceed: Yes    I was in the office while conducting this encounter. Subjective:   Yazan Sewell was seen for Follow-up    Anxiety: Pt has a MHx of Anxiety and depression. She is currently on Xanax 0.5mg take 1 tablet orally BID. Max Daily Amount: 1 MG. The Prescription Monitoring Program has been reviewed for recent activity regarding controlled substances for this patient. I have performed a PHQ screening on the pt today. Non-small cell cancer of R lung: Pt has a MHx of Non-small cell cancer of R lung. She is currently being monitored by a specialist for this. COPD:Pt has a MHx of COPD and is currently on DUO-NEB 2.5mg-0.5mg/3 ml nebu use 3 mL VIA nebulizer every 6 hours as needed for wheezing, Albuterol 90 mcg/actuation inhale 2 puffs orally every 6 hours as needed for wheezing, formoterol 20mcg/2mL nebu jose miguel take 2 mL by nebulization route BID, and Pulmicort 0.5mg/2mL nbsp take 2 mL by nebulization route BID and Spiriva with HandiHaler 18mcg inhalation capsule inhale the contents of 1 capsule via inhalation device daily. Pt requests a refill of their medication, which I have granted. The pt reports that the Pulmicort has not been helping so she has not been taking it anymore. I have ordered a chest X-RAY for the pt's lungs. I have also rx'd the pt Adoxa 100mg take 1 tablet orally BID for 10 days. Restless Leg Syndrome (RLS): Pt has a MHx of RLS and was on Requip 0.5mg take 1 tablet orally TID.  She reports that the Requip has not been helping and is not currently taking it. I have rx'd the pt Neurontin 300mg open in the AM and two at night. The Prescription Monitoring Program has been reviewed for recent activity regarding controlled substances for this patient. PAD: Pt has a MHx of Peripheral Artery Disease and is currently on Plavix 75mg take 1 tablet orally daily. She is currently being monitored by a specialist for this. Pt reports that she has developed a cough in which she has started spitting brown sputum from it. Pt reports that she is talking to someone about this. Prior to Admission medications    Medication Sig Start Date End Date Taking? Authorizing Provider   albuterol (PROVENTIL HFA, VENTOLIN HFA, PROAIR HFA) 90 mcg/actuation inhaler Take 2 Puffs by inhalation every four (4) hours as needed for Wheezing. 2/21/22  Yes Laura Carballo MD   doxycycline (ADOXA) 100 mg tablet Take 1 Tablet by mouth two (2) times a day for 10 days. 2/21/22 3/3/22 Yes Remberto Chávez MD   gabapentin (NEURONTIN) 300 mg capsule One in the AM and two at night  Indications: neuropathic pain 2/21/22  Yes Remberto Chávez MD   ALPRAZolam (XANAX) 0.5 mg tablet TAKE 1 TABLET BY MOUTH TWICE DAILY AS NEEDED FOR ANXIETY.  MAX DAILY AMOUNT: 1 MG 2/12/22  Yes Remberto Chávez MD   clopidogreL (PLAVIX) 75 mg tab TAKE 1 TABLET BY MOUTH DAILY 2/11/22  Yes Remberto Chávez MD   albuterol-ipratropium (DUO-NEB) 2.5 mg-0.5 mg/3 ml nebu USE 3 ML VIA NEBULIZER EVERY 6 HOURS AS NEEDED FOR WHEEZING 2/7/22  Yes Hemant Carballo MD   metoclopramide HCl (REGLAN) 5 mg tablet TAKE 1 TABLET BY MOUTH THREE TIMES DAILY BEFORE MEALS 12/30/21  Yes Remberto Chávez MD   famotidine (PEPCID) 40 mg tablet TAKE 1 TABLET BY MOUTH EVERY DAY 12/30/21  Yes Remberto Chávez MD   lisinopriL (PRINIVIL, ZESTRIL) 2.5 mg tablet TAKE 1 TABLET BY MOUTH EVERY DAY 12/30/21  Yes Remberto Chávez MD   formoterol (Perforomist) 20 mcg/2 mL nebu neb solution 2 mL by Nebulization route two (2) times a day. Dx:J44.9 12/8/21  Yes Guilherme Priest NP   albuterol-ipratropium (DUO-NEB) 2.5 mg-0.5 mg/3 ml nebu USE 3 ML VIA NEBULIZER EVERY 6 HOURS AS NEEDED FOR WHEEZING 11/15/21  Yes Hemant Carballo MD   simvastatin (ZOCOR) 40 mg tablet TAKE 1 TABLET BY MOUTH EVERY NIGHT 10/6/21  Yes Hemant Carballo MD   escitalopram oxalate (LEXAPRO) 20 mg tablet TAKE 1 TABLET BY MOUTH EVERY DAY AS NEEDED FOR ANXIETY 10/6/21  Yes Cheri Parrish MD   pantoprazole (PROTONIX) 40 mg tablet TAKE 1 TABLET BY MOUTH DAILY 9/9/21  Yes Cheri Parrish MD   Oxygen At home 2 liters as needed and QHS   Yes Provider, Historical   Spiriva with HandiHaler 18 mcg inhalation capsule INHALE THE CONTENTS OF 1 CAPSULE VIA INHALATION DEVICE DAILY 5/5/21  Yes Cheri Parrish MD   aspirin delayed-release 81 mg tablet 81 mg. 10/2/20  Yes Provider, Historical   nitroglycerin (NITROSTAT) 0.4 mg SL tablet 0.4 mg. Yes Provider, Historical   gabapentin (NEURONTIN) 300 mg capsule TAKE 1 CAPSULE BY MOUTH EVERY NIGHT AT BEDTIME 1/31/22 2/21/22  Cheri Parrish MD   albuterol (PROVENTIL HFA, VENTOLIN HFA, PROAIR HFA) 90 mcg/actuation inhaler INHALE 2 PUFFS BY MOUTH EVERY 6 HOURS AS NEEDED FOR WHEEZING 1/2/22 2/21/22  Reta Carballo MD   rOPINIRole (REQUIP) 0.5 mg tablet TAKE 1 TABLET BY MOUTH THREE TIMES DAILY 12/30/21 2/21/22  Reta Carballo MD   budesonide (PULMICORT) 0.5 mg/2 mL nbsp 2 mL by Nebulization route two (2) times a day.  11/2/21 2/21/22  Cheri Parrish MD     Allergies   Allergen Reactions    Lemon Oil Nausea and Vomiting    Amoxil [Amoxicillin] Diarrhea    Anoro Ellipta [Umeclidinium-Vilanterol] Rash    Codeine Nausea and Vomiting    Fluticasone-Umeclidin-Vilanter Rash    Metoprolol Nausea and Vomiting     Past Medical History:   Diagnosis Date    Acute respiratory failure with hypoxia ([facility] 01/2020    Adenoma of right lung 2018    CAD (coronary artery disease)     stents 2005 and 2009    Depression     Esophageal ulcer 2020    GERD (gastroesophageal reflux disease)     Hypercholesterolemia     elevated particle number    Hypertension     MI (myocardial infarction) ([facility] [address] ,     Non-small cell cancer of right lung ([facility] 2015    Osteoarthritis     PAD (peripheral artery disease) ([facility]     TIA (transient ischemic attack)     Tobacco abuse 2014     Past Surgical History:   Procedure Laterality Date    HAND/FINGER SURGERY UNLISTED      left hand     HX CHOLECYSTECTOMY      HX COLONOSCOPY  2015    HX ENDOSCOPY  2020    HX HEART CATHETERIZATION  2009    HX HIP REPLACEMENT Bilateral     HX PATRICIA AND BSO          HX VASCULAR ANGIOPLASTY Bilateral 2017    right SFA, left politeal artery     Family History   Problem Relation Age of Onset    Cancer Mother         breast,ovarian colon     Hypertension Father     Stroke Father     Cancer Maternal Grandmother         colon    Cancer Maternal Grandfather      Social History     Tobacco Use    Smoking status: Current Every Day Smoker     Packs/day: 1.00     Years: 40.00     Pack years: 40.00     Types: Cigarettes     Last attempt to quit: 2020     Years since quittin.1    Smokeless tobacco: Never Used   Substance Use Topics    Alcohol use: No        Review of Systems   Constitutional: Negative for chills and fever. HENT: Negative for hearing loss and tinnitus. Eyes: Negative for blurred vision and double vision. Respiratory: Negative for cough and shortness of breath. Cardiovascular: Negative for chest pain and palpitations. Gastrointestinal: Negative for nausea and vomiting. Genitourinary: Negative for dysuria and frequency. Musculoskeletal: Negative for back pain and falls. Skin: Negative for itching and rash. Neurological: Negative for dizziness, loss of consciousness and headaches. Endo/Heme/Allergies: Negative.     Psychiatric/Behavioral: Negative for depression. The patient is not nervous/anxious. PHYSICAL EXAMINATION:  Vital Signs: (As obtained by patient/caregiver at home)  There were no vitals taken for this visit. Constitutional: [x] Appears well-developed and well-nourished [x] No apparent distress      [] Abnormal -     Mental status: [x] Alert and awake  [x] Oriented to person/place/time [x] Able to follow commands    [] Abnormal -     Eyes:   EOM    [x]  Normal    [] Abnormal -   Sclera  [x]  Normal    [] Abnormal -          Discharge [x]  None visible   [] Abnormal -     HENT: [x] Normocephalic, atraumatic  [] Abnormal -   [x] Mouth/Throat: Mucous membranes are moist    External Ears [x] Normal  [] Abnormal -    Neck: [x] No visualized mass [] Abnormal -     Pulmonary/Chest: [x] Respiratory effort normal   [x] No visualized signs of difficulty breathing or respiratory distress        [] Abnormal -      Musculoskeletal:   [x] Normal gait with no signs of ataxia         [x] Normal range of motion of neck        [] Abnormal -     Neurological:        [x] No Facial Asymmetry (Cranial nerve 7 motor function) (limited exam due to video visit)          [x] No gaze palsy        [] Abnormal -          Skin:        [x] No significant exanthematous lesions or discoloration noted on facial skin         [] Abnormal -            Psychiatric:       [x] Normal Affect [] Abnormal -        [x] No Hallucinations    Other pertinent observable physical exam findings:-    Assessment & Plan:   Diagnoses and all orders for this visit:    1. Anxiety  Pt has a MHx of Anxiety and depression. She is currently on Xanax 0.5mg take 1 tablet orally BID. Max Daily Amount: 1 MG. The Prescription Monitoring Program has been reviewed for recent activity regarding controlled substances for this patient. I have performed a PHQ screening on the pt today. 2. Depression, recurrent ([facility] [address]  Refer to #1.   Assessment & Plan:   at goal, continue current medications, continue current treatment plan      3. Non-small cell cancer of right lung (HCC)  Pt has a MHx of Non-small cell cancer of R lung. She is currently being monitored by a specialist for this. Assessment & Plan:   monitored by specialist. No acute findings meriting change in the plan    4. SOB (shortness of breath)  Refer to #5.  -     XR CHEST PA LAT; Future    5. Chronic obstructive pulmonary disease with acute lower respiratory infection (HCC)  Pt has a MHx of COPD and is currently on DUO-NEB 2.5mg-0.5mg/3 ml nebu use 3 mL VIA nebulizer every 6 hours as needed for wheezing, Albuterol 90 mcg/actuation inhale 2 puffs orally every 6 hours as needed for wheezing, formoterol 20mcg/2mL nebu jose miguel take 2 mL by nebulization route BID, and Pulmicort 0.5mg/2mL nbsp take 2 mL by nebulization route BID and Spiriva with HandiHaler 18mcg inhalation capsule inhale the contents of 1 capsule via inhalation device daily. Pt requests a refill of their medication, which I have granted. The pt reports that the Pulmicort has not been helping so she has not been taking it anymore. I have ordered a chest X-RAY for the pt's lungs. I have also rx'd the pt Adoxa 100mg take 1 tablet orally BID for 10 days. -     XR CHEST PA LAT; Future  -     doxycycline (ADOXA) 100 mg tablet; Take 1 Tablet by mouth two (2) times a day for 10 days. 6. PAD (peripheral artery disease) ([facility])   Pt has a MHx of Peripheral Artery Disease and is currently on Plavix 75mg take 1 tablet orally daily. She is currently being monitored by a specialist for this. Assessment & Plan:   monitored by specialist. No acute findings meriting change in the plan    7. Chronic obstructive pulmonary disease, unspecified COPD type ([address]  Refer to #5.  -     albuterol (PROVENTIL HFA, VENTOLIN HFA, PROAIR HFA) 90 mcg/actuation inhaler; Take 2 Puffs by inhalation every four (4) hours as needed for Wheezing.     8. RLS (restless legs syndrome)   Pt has a MHx of RLS and was on Requip 0.5mg take 1 tablet orally TID. She reports that the Requip has not been helping and is not currently taking it. I have rx'd the pt Neurontin 300mg open in the AM and two at night. The Prescription Monitoring Program has been reviewed for recent activity regarding controlled substances for this patient.   -     gabapentin (NEURONTIN) 300 mg capsule; One in the AM and two at night  Indications: neuropathic pain        Follow-up and Dispositions    · Return in about 3 months (around 5/21/2022). We discussed the expected course, resolution and complications of the diagnosis(es) in detail. Medication risks, benefits, costs, interactions, and alternatives were discussed as indicated. I advised her to contact the office if her condition worsens, changes or fails to improve as anticipated. She expressed understanding with the diagnosis(es) and plan. Pursuant to the emergency declaration under the [address] and [place] [address] waiver authority and the [employer] and [employer] Act, this Virtual Visit was conducted, with patient's consent, to reduce the patient's risk of exposure to COVID-19 and provide continuity of care for an established patient. Services were provided through a video synchronous discussion virtually to substitute for in-person clinic visit. Written by tracey Foy, as dictated by Muriel Parker M.D.    Yasmine Love PM - 6:55 PM    Total time spent with the patient 13 minutes, greater than 50% of time spent counseling patient.

## 2022-02-25 ENCOUNTER — DOCUMENTATION ONLY (OUTPATIENT)
Dept: FAMILY MEDICINE CLINIC | Age: 52
End: 2022-02-25

## 2022-02-28 RX ORDER — PANTOPRAZOLE SODIUM 40 MG/1
TABLET, DELAYED RELEASE ORAL
Qty: 90 TABLET | Refills: 0 | Status: SHIPPED | OUTPATIENT
Start: 2022-02-28 | End: 2022-03-30

## 2022-03-03 ENCOUNTER — DOCUMENTATION ONLY (OUTPATIENT)
Dept: FAMILY MEDICINE CLINIC | Age: 52
End: 2022-03-03

## 2022-03-09 ENCOUNTER — TELEPHONE (OUTPATIENT)
Dept: FAMILY MEDICINE CLINIC | Age: 52
End: 2022-03-09

## 2022-03-09 NOTE — TELEPHONE ENCOUNTER
Chief Complaint   Patient presents with    Prior Auth     Albuterol Sulfate  (90 Base)MCG/ACT aerosol:  Request Reference Number: [id number]. ALBUTEROL AER HFA is approved through 12/31/2022. Your patient may now fill this prescription and it will be covered. [address] [place] [address] was faxed approval notification at [phone] with confirmation received.   Adrián Jonas LPN

## 2022-03-11 NOTE — TELEPHONE ENCOUNTER
MD Carballo,    Patient call stating she needs doxycycline. She is having bad swollen gland- having hard time eating, swallowing, and warm under her jaw. Asking if nurse can call her back today!   [phone]    Thanks, Naomie Shelton

## 2022-03-11 NOTE — TELEPHONE ENCOUNTER
TC to Patient. ID verified. Calling to advise she wants DR. Carballo to call in a antibiotic. Advised DR. Carballo is not in clinic until Monday. Patient does not want to go to Urgent Care. Offered Reza Grullon as well. Patient advised she will need appointment we cannot just call in Antibiotics for her. Patient scheduled for VV on Monday with DR. Carballo.

## 2022-03-14 ENCOUNTER — VIRTUAL VISIT (OUTPATIENT)
Dept: FAMILY MEDICINE CLINIC | Age: 52
End: 2022-03-14
Payer: MEDICARE

## 2022-03-14 DIAGNOSIS — K22.2 ESOPHAGEAL STRICTURE: ICD-10-CM

## 2022-03-14 DIAGNOSIS — R59.9 GLANDS SWOLLEN: Primary | ICD-10-CM

## 2022-03-14 DIAGNOSIS — R07.0 THROAT PAIN: ICD-10-CM

## 2022-03-14 PROCEDURE — 99213 OFFICE O/P EST LOW 20 MIN: CPT | Performed by: FAMILY MEDICINE

## 2022-03-14 PROCEDURE — G8756 NO BP MEASURE DOC: HCPCS | Performed by: FAMILY MEDICINE

## 2022-03-14 PROCEDURE — G8427 DOCREV CUR MEDS BY ELIG CLIN: HCPCS | Performed by: FAMILY MEDICINE

## 2022-03-14 PROCEDURE — G9717 DOC PT DX DEP/BP F/U NT REQ: HCPCS | Performed by: FAMILY MEDICINE

## 2022-03-14 PROCEDURE — 3017F COLORECTAL CA SCREEN DOC REV: CPT | Performed by: FAMILY MEDICINE

## 2022-03-14 PROCEDURE — G9899 SCRN MAM PERF RSLTS DOC: HCPCS | Performed by: FAMILY MEDICINE

## 2022-03-14 RX ORDER — DOXYCYCLINE 100 MG/1
100 TABLET ORAL 2 TIMES DAILY
Qty: 20 TABLET | Refills: 0 | Status: SHIPPED | OUTPATIENT
Start: 2022-03-14 | End: 2022-03-24

## 2022-03-14 NOTE — PROGRESS NOTES
Ines Pritchett is a 46 y.o. female who was seen by synchronous (real-time) audio-video technology on 3/14/2022. Swollen Gland: Pt c/o bilateral swollen gland (L>R). She notes this has been on and off. She notes a few days ago she woke up and the L side of her neck was warm and tender to touch. Eating on left side aggravated symptoms. She denies issues with teeth. She reports these symptoms resolved after a few days. Pt continues to have lingering cough and occasional chills. She is concerned about GI upset with taking antibiotics. Esophageal stricture: Pt scheduled to see Dr. Selena Brown (GI) in early May for evaluation of esophagus. Consent:  Services were provided through a video synchronous discussion virtually to substitute for in-person appointment. She and/or her healthcare decision maker is aware that this patient-initiated Telehealth encounter is a billable service, with coverage as determined by her insurance carrier. She is aware that she may receive a bill and has provided verbal consent to proceed: Yes    I was in the office while conducting this encounter. Subjective:   Faith Sewell was seen for URI      Prior to Admission medications    Medication Sig Start Date End Date Taking? Authorizing Provider   doxycycline (ADOXA) 100 mg tablet Take 1 Tablet by mouth two (2) times a day for 10 days. 3/14/22 3/24/22 Yes Hemant Carballo MD   pantoprazole (PROTONIX) 40 mg tablet TAKE 1 TABLET BY MOUTH DAILY 2/28/22  Yes Aiyana Duffy MD   albuterol (PROVENTIL HFA, VENTOLIN HFA, PROAIR HFA) 90 mcg/actuation inhaler Take 2 Puffs by inhalation every four (4) hours as needed for Wheezing. 2/21/22  Yes Aiyana Duffy MD   gabapentin (NEURONTIN) 300 mg capsule One in the AM and two at night  Indications: neuropathic pain 2/21/22  Yes Aiyana Duffy MD   ALPRAZolam (XANAX) 0.5 mg tablet TAKE 1 TABLET BY MOUTH TWICE DAILY AS NEEDED FOR ANXIETY.  MAX DAILY AMOUNT: 1 MG 2/12/22  Yes Haris Subhash Arteaga MD   clopidogreL (PLAVIX) 75 mg tab TAKE 1 TABLET BY MOUTH DAILY 2/11/22  Yes Nathanael Hendrix MD   albuterol-ipratropium (DUO-NEB) 2.5 mg-0.5 mg/3 ml nebu USE 3 ML VIA NEBULIZER EVERY 6 HOURS AS NEEDED FOR WHEEZING 2/7/22  Yes Hemant Carballo MD   metoclopramide HCl (REGLAN) 5 mg tablet TAKE 1 TABLET BY MOUTH THREE TIMES DAILY BEFORE MEALS 12/30/21  Yes Nathanael Hendrix MD   famotidine (PEPCID) 40 mg tablet TAKE 1 TABLET BY MOUTH EVERY DAY 12/30/21  Yes Nathanael Hendrix MD   lisinopriL (PRINIVIL, ZESTRIL) 2.5 mg tablet TAKE 1 TABLET BY MOUTH EVERY DAY 12/30/21  Yes Nathanael Hendrix MD   formoterol (Perforomist) 20 mcg/2 mL nebu neb solution 2 mL by Nebulization route two (2) times a day. Dx:J44.9 12/8/21  Yes Scott Priest NP   albuterol-ipratropium (DUO-NEB) 2.5 mg-0.5 mg/3 ml nebu USE 3 ML VIA NEBULIZER EVERY 6 HOURS AS NEEDED FOR WHEEZING 11/15/21  Yes Hemant Carballo MD   simvastatin (ZOCOR) 40 mg tablet TAKE 1 TABLET BY MOUTH EVERY NIGHT 10/6/21  Yes Nathanael Hendrix MD   escitalopram oxalate (LEXAPRO) 20 mg tablet TAKE 1 TABLET BY MOUTH EVERY DAY AS NEEDED FOR ANXIETY 10/6/21  Yes Nathanael Hendrix MD   Oxygen At home 2 liters as needed and QHS   Yes Provider, Historical   Spiriva with HandiHaler 18 mcg inhalation capsule INHALE THE CONTENTS OF 1 CAPSULE VIA INHALATION DEVICE DAILY 5/5/21  Yes Nathanael Hendrix MD   aspirin delayed-release 81 mg tablet 81 mg. 10/2/20  Yes Provider, Historical   nitroglycerin (NITROSTAT) 0.4 mg SL tablet 0.4 mg.    Yes Provider, Historical     Allergies   Allergen Reactions    Lemon Oil Nausea and Vomiting    Amoxil [Amoxicillin] Diarrhea    Anoro Ellipta [Umeclidinium-Vilanterol] Rash    Codeine Nausea and Vomiting    Fluticasone-Umeclidin-Vilanter Rash    Metoprolol Nausea and Vomiting     Past Medical History:   Diagnosis Date    Acute [facility] failure with [facility] 01/2020    Adenoma of right lung 2018    CAD (coronary artery disease)     stents  and     Depression     Esophageal ulcer 2020    GERD (gastroesophageal reflux disease)     Hypercholesterolemia     elevated particle number    Hypertension     MI (myocardial infarction) ([facility] ,     [facility] 2015    Osteoarthritis     [facility]     TIA (transient ischemic attack)     Tobacco abuse 2014     Past Surgical History:   Procedure Laterality Date    HAND/FINGER SURGERY UNLISTED      left hand     HX CHOLECYSTECTOMY      HX COLONOSCOPY  2015    HX ENDOSCOPY  2020    HX HEART CATHETERIZATION  2009    HX HIP REPLACEMENT Bilateral     HX PATRICIA AND BSO          HX VASCULAR ANGIOPLASTY Bilateral 2017    right SFA, left politeal artery     Family History   Problem Relation Age of Onset    Cancer Mother         breast,ovarian colon     Hypertension Father     Stroke Father     Cancer Maternal Grandmother         colon    Cancer Maternal Grandfather      Social History     Tobacco Use    Smoking status: Current Every Day Smoker     Packs/day: 1.00     Years: 40.00     Pack years: 40.00     Types: Cigarettes     Last attempt to quit: 2020     Years since quittin.1    Smokeless tobacco: Never Used   Substance Use Topics    Alcohol use: No        Review of Systems   Constitutional: Positive for chills. Negative for fever. HENT: Negative for hearing loss and tinnitus. Eyes: Negative for blurred vision and double vision. Respiratory: Positive for cough. Negative for shortness of breath. Cardiovascular: Negative for chest pain and palpitations. Gastrointestinal: Negative for nausea and vomiting. Genitourinary: Negative for dysuria and frequency. Musculoskeletal: Negative for back pain and falls. Skin: Negative for itching and rash. Neurological: Negative for dizziness, loss of consciousness and headaches. Endo/Heme/Allergies: Negative. Swollen neck glands bilaterally   Psychiatric/Behavioral: Negative for depression. The patient is not nervous/anxious. PHYSICAL EXAMINATION:  Vital Signs: (As obtained by patient/caregiver at home)  There were no vitals taken for this visit. Constitutional: [x] Appears well-developed and well-nourished [x] No apparent distress      [] Abnormal -     Mental status: [x] Alert and awake  [x] Oriented to person/place/time [x] Able to follow commands    [] Abnormal -     Eyes:   EOM    [x]  Normal    [] Abnormal -   Sclera  [x]  Normal    [] Abnormal -          Discharge [x]  None visible   [] Abnormal -     HENT: [x] Normocephalic, atraumatic  [] Abnormal -   [x] Mouth/Throat: Mucous membranes are moist    External Ears [x] Normal  [] Abnormal -    Neck: [x] No visualized mass [] Abnormal -     Pulmonary/Chest: [x] Respiratory effort normal   [x] No visualized signs of difficulty breathing or respiratory distress        [] Abnormal -      Musculoskeletal:   [x] Normal gait with no signs of ataxia         [x] Normal range of motion of neck        [] Abnormal -     Neurological:        [x] No Facial Asymmetry (Cranial nerve 7 motor function) (limited exam due to video visit)          [x] No gaze palsy        [] Abnormal -          Skin:        [x] No significant exanthematous lesions or discoloration noted on facial skin         [] Abnormal -            Psychiatric:       [x] Normal Affect [] Abnormal -        [x] No Hallucinations    Other pertinent observable physical exam findings:-    Assessment & Plan:   Diagnoses and all orders for this visit:    1. Glands swollen  -     REFERRAL TO ENT-OTOLARYNGOLOGY  -     doxycycline (ADOXA) 100 mg tablet; Take 1 Tablet by mouth two (2) times a day for 10 days. Pt will start doxycyline 100mg BID. If symptoms do not improve, pt was advised to follow up with Dr. Marco A William (ENT) for further evaluation and treatment.      2. Throat pain  -     REFERRAL TO ENT-OTOLARYNGOLOGY  -     doxycycline (ADOXA) 100 mg tablet; Take 1 Tablet by mouth two (2) times a day for 10 days. See #1.     3. Esophageal stricture  -     REFERRAL TO ENT-OTOLARYNGOLOGY  Pt referred to ENT for further evaluation. Follow-up and Dispositions    · Return if symptoms worsen or fail to improve. We discussed the expected course, resolution and complications of the diagnosis(es) in detail. Medication risks, benefits, costs, interactions, and alternatives were discussed as indicated. I advised her to contact the office if her condition worsens, changes or fails to improve as anticipated. She expressed understanding with the diagnosis(es) and plan. Pursuant to the emergency declaration under the [address] and [facility] waiver authority and the [employer] Act, this Virtual Visit was conducted, with patient's consent, to reduce the patient's risk of exposure to COVID-19 and provide continuity of care for an established patient. Services were provided through a video synchronous discussion virtually to substitute for in-person clinic visit. Written by tracey Amin, as dictated by Gorge Mar M.D.    12:58 PM - 1:06 PM      Total time spent with the patient 15 minutes, greater than 50% of time spent counseling patient.

## 2022-03-15 NOTE — PROGRESS NOTES
[phone] per patient already saw results in my chart and already saw Dr Manny Colon (cardiologist) and aware and understand her echo results Telephone call with [facility]. She indicated that patient's name is on waiting list but cannot say where she is on list or when services will start.

## 2022-03-18 PROBLEM — F33.9 DEPRESSION, RECURRENT (HCC): Status: ACTIVE | Noted: 2021-02-22

## 2022-03-18 PROBLEM — I73.9 PAD (PERIPHERAL ARTERY DISEASE) (HCC): Status: ACTIVE | Noted: 2020-08-20

## 2022-03-18 PROBLEM — J43.2 CENTRILOBULAR EMPHYSEMA (HCC): Status: ACTIVE | Noted: 2020-08-20

## 2022-03-19 PROBLEM — C34.91 NON-SMALL CELL CANCER OF RIGHT LUNG (HCC): Status: ACTIVE | Noted: 2020-08-20

## 2022-03-19 PROBLEM — E66.3 OVER WEIGHT: Status: ACTIVE | Noted: 2017-08-21

## 2022-03-30 DIAGNOSIS — K21.9 GASTROESOPHAGEAL REFLUX DISEASE WITHOUT ESOPHAGITIS: ICD-10-CM

## 2022-03-30 RX ORDER — PANTOPRAZOLE SODIUM 40 MG/1
TABLET, DELAYED RELEASE ORAL
Qty: 90 TABLET | Refills: 0 | Status: SHIPPED | OUTPATIENT
Start: 2022-03-30 | End: 2022-05-05

## 2022-03-30 RX ORDER — LISINOPRIL 2.5 MG/1
TABLET ORAL
Qty: 90 TABLET | Refills: 0 | Status: SHIPPED | OUTPATIENT
Start: 2022-03-30 | End: 2022-06-25

## 2022-03-30 RX ORDER — FAMOTIDINE 40 MG/1
TABLET, FILM COATED ORAL
Qty: 90 TABLET | Refills: 0 | Status: SHIPPED | OUTPATIENT
Start: 2022-03-30 | End: 2022-06-25

## 2022-03-30 RX ORDER — METOCLOPRAMIDE 5 MG/1
TABLET ORAL
Qty: 270 TABLET | Refills: 0 | Status: SHIPPED | OUTPATIENT
Start: 2022-03-30 | End: 2022-06-25

## 2022-03-30 RX ORDER — ESCITALOPRAM OXALATE 20 MG/1
TABLET ORAL
Qty: 90 TABLET | Refills: 1 | Status: SHIPPED | OUTPATIENT
Start: 2022-03-30 | End: 2022-09-09

## 2022-04-18 NOTE — TELEPHONE ENCOUNTER
MD Carballo,    Patient call and left message that she is having trouble breathing and asking for callback asap on what she should do?     She stated her pulse ox 84 or 85  Heartbeat is 102    [phone]    Thanks, Alexandro Lopez

## 2022-04-20 ENCOUNTER — TELEPHONE (OUTPATIENT)
Dept: FAMILY MEDICINE CLINIC | Age: 52
End: 2022-04-20

## 2022-04-21 RX ORDER — BUMETANIDE 0.5 MG/1
0.5 TABLET ORAL DAILY
Qty: 90 TABLET | Refills: 0 | Status: SHIPPED | OUTPATIENT
Start: 2022-04-21 | End: 2022-07-13

## 2022-04-21 NOTE — TELEPHONE ENCOUNTER
MD Carballo,    Patient call stating she has been discharged from the hospital and is feeling/breathing better. Contacted patient for further information to pass on:    Patient is asking for a call from Yodit Hernandez and also MD Brittany Grant. Would like hospital F/U appt with MD Carballo but asking just for a phone call from him. Cannot do VV due to computer. She needs fluid pill prescription- Bumetanide prescription sent to pharmacy- Hx of 0.5mg from 11/7/19. RE-Entered bumetanide 0.5mg qd if appropriate. Thanks, Robertson Aas     Last Visit: VV 3/14/22 MD carballo  Next Appointment: Please call patient to schedule hospital f/u. Previous Refill Encounter(s): 11/7/19 90 (discontinued 8/2020 as not a current med)    Requested Prescriptions     Pending Prescriptions Disp Refills    bumetanide (BUMEX) 0.5 mg tablet 90 Tablet 0     Sig: Take 1 Tablet by mouth daily.

## 2022-05-02 DIAGNOSIS — F41.9 ANXIETY: ICD-10-CM

## 2022-05-03 RX ORDER — ALPRAZOLAM 0.5 MG/1
TABLET ORAL
Qty: 60 TABLET | Refills: 0 | Status: SHIPPED | OUTPATIENT
Start: 2022-05-03 | End: 2022-05-24 | Stop reason: SDUPTHER

## 2022-05-04 ENCOUNTER — OFFICE VISIT (OUTPATIENT)
Dept: FAMILY MEDICINE CLINIC | Age: 52
End: 2022-05-04
Payer: MEDICARE

## 2022-05-04 VITALS
OXYGEN SATURATION: 94 % | BODY MASS INDEX: 22.84 KG/M2 | DIASTOLIC BLOOD PRESSURE: 60 MMHG | RESPIRATION RATE: 18 BRPM | TEMPERATURE: 97.5 F | HEIGHT: 64 IN | WEIGHT: 133.8 LBS | HEART RATE: 95 BPM | SYSTOLIC BLOOD PRESSURE: 100 MMHG

## 2022-05-04 DIAGNOSIS — I50.42 CHRONIC COMBINED SYSTOLIC AND DIASTOLIC CONGESTIVE HEART FAILURE (HCC): Primary | ICD-10-CM

## 2022-05-04 DIAGNOSIS — E78.00 HYPERCHOLESTEROLEMIA: ICD-10-CM

## 2022-05-04 DIAGNOSIS — J43.2 CENTRILOBULAR EMPHYSEMA (HCC): ICD-10-CM

## 2022-05-04 DIAGNOSIS — C34.91 NON-SMALL CELL CANCER OF RIGHT LUNG (HCC): ICD-10-CM

## 2022-05-04 DIAGNOSIS — F41.8 MIXED ANXIETY AND DEPRESSIVE DISORDER: ICD-10-CM

## 2022-05-04 DIAGNOSIS — R89.9 ABNORMAL LABORATORY TEST RESULT: ICD-10-CM

## 2022-05-04 PROCEDURE — 99214 OFFICE O/P EST MOD 30 MIN: CPT | Performed by: STUDENT IN AN ORGANIZED HEALTH CARE EDUCATION/TRAINING PROGRAM

## 2022-05-04 RX ORDER — PREDNISONE 10 MG/1
TABLET ORAL
COMMUNITY
Start: 2022-04-20 | End: 2022-05-05 | Stop reason: ALTCHOICE

## 2022-05-04 NOTE — PROGRESS NOTES
Catarino Morrow  46 y.o. female  1970  [address] [address]  [id number]     [facility]       Chief Complaint: hospital followup  Source: self and the medical record    Subjective  Catarino Morrow is an 46 y.o. female who presents for hospital followup for CHF exacerbation. Admitted to [facility] 4/18-4/20 for acute on chronic respiratory failure, acute CHF exacerbation and COPD exacerbation. Respiratory status improved on steroids and was discharged on steroid taper. She is on 2L O2 at baseline at night. She continues to smoke. She has a hx of NSCLC s/p chemo and salvage radiation - pulmonology recommended repeat CT once respiratory status improved. Discharging physician ecommended repeat CBC, CMP at PCP followup due to initial lab abnormalities. She currently has standing orders for both from Dr Fili Deshpande. Feels improvement from starting on bumex and now has an appetite which she hasn't had for months. Has baseline dyspnea with exertion which is getting better since discharge. Discharged on Duonebs scheduled 4X daily which she is adherent to. Uses O2 at night and also with naps during the day. She has a f/u with Dr Colton Goodpasture (pulm) and with cardiologist (Dr Davi Mcgee) in 3 months - has had two visits with cardiology since hospital admission. Planning for pulm rehab - getting this setup through pulmonology office. Allergies - reviewed: Allergies   Allergen Reactions    Lemon Oil Nausea and Vomiting    Amoxil [Amoxicillin] Diarrhea    Anoro Ellipta [Umeclidinium-Vilanterol] Rash    Codeine Nausea and Vomiting    Fluticasone-Umeclidin-Vilanter Rash    Metoprolol Nausea and Vomiting         Medications - reviewed:   Current Outpatient Medications   Medication Sig    ALPRAZolam (XANAX) 0.5 mg tablet TAKE 1 TABLET BY MOUTH TWICE DAILY AS NEEDED FOR ANXIETY. MAX DAILY AMOUNT: 1 MG    bumetanide (BUMEX) 0.5 mg tablet Take 1 Tablet by mouth daily.     metoclopramide HCl (REGLAN) 5 mg tablet TAKE 1 TABLET BY MOUTH THREE TIMES DAILY BEFORE MEALS    famotidine (PEPCID) 40 mg tablet TAKE 1 TABLET BY MOUTH EVERY DAY    lisinopriL (PRINIVIL, ZESTRIL) 2.5 mg tablet TAKE 1 TABLET BY MOUTH EVERY DAY    escitalopram oxalate (LEXAPRO) 20 mg tablet TAKE 1 TABLET BY MOUTH EVERY DAY AS NEEDED FOR ANXIETY    albuterol (PROVENTIL HFA, VENTOLIN HFA, PROAIR HFA) 90 mcg/actuation inhaler Take 2 Puffs by inhalation every four (4) hours as needed for Wheezing.  gabapentin (NEURONTIN) 300 mg capsule One in the AM and two at night  Indications: neuropathic pain    clopidogreL (PLAVIX) 75 mg tab TAKE 1 TABLET BY MOUTH DAILY    albuterol-ipratropium (DUO-NEB) 2.5 mg-0.5 mg/3 ml nebu USE 3 ML VIA NEBULIZER EVERY 6 HOURS AS NEEDED FOR WHEEZING    formoterol (Perforomist) 20 mcg/2 mL nebu neb solution 2 mL by Nebulization route two (2) times a day. Dx:J44.9    simvastatin (ZOCOR) 40 mg tablet TAKE 1 TABLET BY MOUTH EVERY NIGHT    Oxygen At home 2 liters as needed and QHS    Spiriva with HandiHaler 18 mcg inhalation capsule INHALE THE CONTENTS OF 1 CAPSULE VIA INHALATION DEVICE DAILY    aspirin delayed-release 81 mg tablet 81 mg.    nitroglycerin (NITROSTAT) 0.4 mg SL tablet 0.4 mg. (Patient not taking: Reported on 5/4/2022)     No current facility-administered medications for this visit.          Past Medical History - reviewed:  Past Medical History:   Diagnosis Date    Acute respiratory failure with hypoxia ([facility] 01/2020    Adenoma of right lung 2018    CAD (coronary artery disease)     stents 2005 and 2009    Depression     Esophageal ulcer 01/2020    GERD (gastroesophageal reflux disease)     Hypercholesterolemia     elevated particle number    Hypertension     MI (myocardial infarction) ([facility] 2005, 2009    Non-small cell cancer of right lung ([facility] 08/2015    Osteoarthritis     Over weight 8/21/2017    PAD (peripheral artery disease) ([facility] 2017    TIA (transient ischemic attack)     Tobacco abuse 2014         Past Surgical History - reviewed:   Past Surgical History:   Procedure Laterality Date    HAND/FINGER SURGERY UNLISTED      left hand     HX CHOLECYSTECTOMY      HX COLONOSCOPY  2015    HX ENDOSCOPY  2020    HX HEART CATHETERIZATION  ,     HX HIP REPLACEMENT Bilateral     HX PATRICIA AND BSO          HX VASCULAR ANGIOPLASTY Bilateral 2017    right SFA, left politeal artery         Social History - reviewed:  Social History     Socioeconomic History    Marital status: SINGLE     Spouse name: Not on file    Number of children: Not on file    Years of education: Not on file    Highest education level: Not on file   Occupational History    Occupation: [employer]   Tobacco Use    Smoking status: Current Every Day Smoker     Packs/day: 1.00     Years: 40.00     Pack years: 40.00     Types: Cigarettes     Last attempt to quit: 2020     Years since quittin.3    Smokeless tobacco: Never Used   Vaping Use    Vaping Use: Never used   Substance and Sexual Activity    Alcohol use: No    Drug use: No    Sexual activity: Not Currently     Partners: Male   Other Topics Concern     Service No    Blood Transfusions Yes    Caffeine Concern No    Occupational Exposure No    Hobby Hazards No    Sleep Concern Yes     Comment: sleep apnea    Stress Concern Yes    Weight Concern No    Special Diet No    Back Care Yes     Comment: arthritis in the back    Exercise Yes     Comment: walk    Bike Helmet No    Seat Belt Yes    Self-Exams Yes   Social History Narrative    Not on file     Social Determinants of Health     Financial Resource Strain:     Difficulty of Paying Living Expenses: Not on file   Food Insecurity:     Worried About Running Out of Food in the Last Year: Not on file    Art of Food in the Last Year: Not on file   Transportation Needs:     Lack of Transportation (Medical):  Not on file    Lack of Transportation (Non-Medical): Not on file   Physical Activity:     Days of Exercise per Week: Not on file    Minutes of Exercise per Session: Not on file   Stress:     Feeling of Stress : Not on file   Social Connections:     Frequency of Communication with Friends and Family: Not on file    Frequency of Social Gatherings with Friends and Family: Not on file    Attends [employer] Services: Not on file    Active Member of [address] [place] [address] [employer] or Organizations: Not on file    Attends Club or Organization Meetings: Not on file    Marital Status: Not on file   Intimate Partner Violence:     Fear of Current or Ex-Partner: Not on file    Emotionally Abused: Not on file    Physically Abused: Not on file    Sexually Abused: Not on file   Housing Stability:     Unable to Pay for Housing in the Last Year: Not on file    Number of [place] in the Last Year: Not on file    Unstable Housing in the Last Year: Not on file         Family History - reviewed:  Family History   Problem Relation Age of Onset    Cancer Mother         breast,ovarian colon     Hypertension Father     Stroke Father     Cancer Maternal Grandmother         colon    Cancer Maternal Grandfather          Immunizations - reviewed:   Immunization History   Administered Date(s) Administered    GIN-19Siobhan, Primary or Immunocompromised Series, MRNA, PF, 100mcg/0.5mL 09/05/2021, 10/04/2021    Td 02/10/2014       Review of Systems   Constitutional: Positive for malaise/fatigue and weight loss. Negative for chills and fever. Respiratory: Positive for cough. Negative for sputum production, shortness of breath and wheezing. Cardiovascular: Negative for chest pain, palpitations, orthopnea, claudication and leg swelling. Gastrointestinal: Negative for abdominal pain, constipation, diarrhea, nausea and vomiting. Musculoskeletal: Negative for myalgias. Skin: Negative for itching and rash. Neurological: Negative for dizziness and headaches. Psychiatric/Behavioral: Negative for depression. The patient has insomnia. The patient is not nervous/anxious. Physical Exam  Visit Vitals  /60 (BP 1 Location: Right arm, BP Patient Position: Sitting, BP Cuff Size: Adult)   Pulse 95   Temp 97.5 °F (36.4 °C) (Temporal)   Resp 18   Ht 5' 4\" (1.626 m)   Wt 133 lb 12.8 oz (60.7 kg)   SpO2 94%   BMI 22.97 kg/m²       Physical Exam  Constitutional:       General: She is not in acute distress. Appearance: Normal appearance. She is normal weight. She is not toxic-appearing. Cardiovascular:      Rate and Rhythm: Normal rate and regular rhythm. Pulses: Normal pulses. Heart sounds: Normal heart sounds. No murmur heard. No gallop. Comments: +crackles posterior-inferior left lung base  Pulmonary:      Effort: Pulmonary effort is normal. No respiratory distress. Breath sounds: No stridor. No decreased breath sounds, wheezing, rhonchi or rales. Comments: + crackles at base of left lung posteriorly. Good air movement throughout, no end expiratory wheezes noted   Neurological:      General: No focal deficit present. Mental Status: She is alert and oriented to person, place, and time. Psychiatric:         Attention and Perception: Attention normal.         Mood and Affect: Affect is flat. Speech: Speech normal.         Behavior: Behavior is cooperative. Assessment/Plan    ICD-10-CM ICD-9-CM    1. Chronic combined systolic and diastolic congestive heart failure (HCC)  I50.42 428.42      428.0    2. Abnormal laboratory test result  R89.9 796.4    3. Non-small cell cancer of right lung (HCC)  C34.91 162.9    4. Centrilobular emphysema (HCC)  J43.2 492.8    5. Mixed anxiety and depressive disorder  F41.8 300.4    6.  Hypercholesterolemia  E78.00 272.0        Estela Kent is an 46 y.o. female wit hhx of COPD on 2L O2 QHS, non-small cell lung cancer in remission (followed by heme-onc) here for [facility] followup for exacerbation of CHF,COPD. She has completed steroid course is is complaint with breathing treatments at home however continues to smoke despite repeated counseling that this is very dangerous with use of O2 and could cause fire with severe burns or even death. She had abnormalities on her CBC and CMP during hospital admission and asked to have those rechecked today however patient declined followup labs. Asked for xanax - expected visit to be for medication refills. Xanax refilled yesterday by Dr Elijah Madden. 1. Chronic combined systolic and diastolic congestive heart failure (HCC)  - continue lisinopril 2.5mg, bumex 0.5mg daily prn   - has routine followup with cardiology scheduled     2. Abnormal laboratory test result  - CBC, CMP ordered previously by PCP; patient refuses labs today    3. Non-small cell cancer of right lung [facility])  - has followup with pulmonology for repeat chest imaging already scheduled     4. Centrilobular emphysema (HCC)  - continue O2 2L QHS and when sleeping  - continue formoterol, duonebs and spiriva   - pulm rehab?  - followed by Dr Farrah Sheth  - advised to quit smoking    5. Mixed anxiety and depressive disorder  - continue xanax 0.5mg QHS + lexapro 40mg     6. Hypercholesterolemia  - continue simvastatin 40mg PO QHS      Patient informed to follow up: Follow-up and Dispositions    · Return in about 19 days (around 5/23/2022) for regular followup with Dr Elijah Madden for chronic conditions. I have discussed the diagnosis with the patient and the intended plan as seen in the above orders. Patient verbalized understanding of the plan and agrees with the plan. The patient has received an after-visit summary and questions were answered concerning future plans. I have discussed medication side effects and warnings with the patient as well. Informed patient to return to the office if new symptoms arise.       Kavya Hoffman MD  [facility]

## 2022-05-04 NOTE — PROGRESS NOTES
Chief Complaint   Patient presents with   [address] 4/22 DX: CHF, Leg Pain     Medication Evaluation     1. \"Have you been to the ER, urgent care clinic since your last visit? Hospitalized since your last visit? \" Mike Kim DR. 2. \"Have you seen or consulted any other health care providers outside of the [address] [place] [address] since your last visit? \" Yes Dr. Sandor Wood: [place] 5/3/22, Dr. Vibha Gonzalez : Heart 4/22,      3. For patients aged 39-70: Has the patient had a colonoscopy / FIT/ Cologuard? No      If the patient is female:    4. For patients aged 41-77: Has the patient had a mammogram within the past 2 years? No      5. For patients aged 21-65: Has the patient had a pap smear?  No PAP paperwork submitted to [employer] for [employer].  Tomer Jimenes, PharmD, BCACP

## 2022-05-20 DIAGNOSIS — R06.02 SOB (SHORTNESS OF BREATH): ICD-10-CM

## 2022-05-20 DIAGNOSIS — J44.9 CHRONIC OBSTRUCTIVE PULMONARY DISEASE, UNSPECIFIED COPD TYPE (HCC): ICD-10-CM

## 2022-05-20 RX ORDER — IPRATROPIUM BROMIDE AND ALBUTEROL SULFATE 2.5; .5 MG/3ML; MG/3ML
SOLUTION RESPIRATORY (INHALATION)
Qty: 90 ML | Refills: 5 | Status: SHIPPED | OUTPATIENT
Start: 2022-05-20 | End: 2022-08-30

## 2022-05-24 ENCOUNTER — VIRTUAL VISIT (OUTPATIENT)
Dept: FAMILY MEDICINE CLINIC | Age: 52
End: 2022-05-24
Payer: MEDICARE

## 2022-05-24 DIAGNOSIS — G25.81 RLS (RESTLESS LEGS SYNDROME): ICD-10-CM

## 2022-05-24 DIAGNOSIS — C34.91 NON-SMALL CELL CANCER OF RIGHT LUNG (HCC): ICD-10-CM

## 2022-05-24 DIAGNOSIS — R06.02 SOB (SHORTNESS OF BREATH): ICD-10-CM

## 2022-05-24 DIAGNOSIS — J44.9 CHRONIC OBSTRUCTIVE PULMONARY DISEASE, UNSPECIFIED COPD TYPE (HCC): ICD-10-CM

## 2022-05-24 DIAGNOSIS — F41.9 ANXIETY: ICD-10-CM

## 2022-05-24 DIAGNOSIS — E78.00 HYPERCHOLESTEROLEMIA: ICD-10-CM

## 2022-05-24 DIAGNOSIS — I73.9 PAD (PERIPHERAL ARTERY DISEASE) (HCC): ICD-10-CM

## 2022-05-24 DIAGNOSIS — I50.42 CHRONIC COMBINED SYSTOLIC AND DIASTOLIC CONGESTIVE HEART FAILURE (HCC): Primary | ICD-10-CM

## 2022-05-24 PROCEDURE — G9899 SCRN MAM PERF RSLTS DOC: HCPCS | Performed by: FAMILY MEDICINE

## 2022-05-24 PROCEDURE — G8427 DOCREV CUR MEDS BY ELIG CLIN: HCPCS | Performed by: FAMILY MEDICINE

## 2022-05-24 PROCEDURE — 99214 OFFICE O/P EST MOD 30 MIN: CPT | Performed by: FAMILY MEDICINE

## 2022-05-24 PROCEDURE — 3017F COLORECTAL CA SCREEN DOC REV: CPT | Performed by: FAMILY MEDICINE

## 2022-05-24 PROCEDURE — G9717 DOC PT DX DEP/BP F/U NT REQ: HCPCS | Performed by: FAMILY MEDICINE

## 2022-05-24 PROCEDURE — G8756 NO BP MEASURE DOC: HCPCS | Performed by: FAMILY MEDICINE

## 2022-05-24 RX ORDER — GABAPENTIN 300 MG/1
CAPSULE ORAL
Qty: 270 CAPSULE | Refills: 2 | Status: SHIPPED | OUTPATIENT
Start: 2022-05-24 | End: 2022-08-22 | Stop reason: SDUPTHER

## 2022-05-24 RX ORDER — CLOPIDOGREL BISULFATE 75 MG/1
75 TABLET ORAL DAILY
Qty: 90 TABLET | Refills: 1 | Status: SHIPPED | OUTPATIENT
Start: 2022-05-24 | End: 2022-09-09

## 2022-05-24 RX ORDER — ALPRAZOLAM 0.5 MG/1
TABLET ORAL
Qty: 60 TABLET | Refills: 2 | Status: SHIPPED | OUTPATIENT
Start: 2022-06-02 | End: 2022-06-04

## 2022-05-24 RX ORDER — BUDESONIDE AND FORMOTEROL FUMARATE DIHYDRATE 160; 4.5 UG/1; UG/1
2 AEROSOL RESPIRATORY (INHALATION) 2 TIMES DAILY
Qty: 10.2 G | Refills: 0
Start: 2022-05-24

## 2022-05-24 NOTE — PROGRESS NOTES
Percy Maxwell is a 46 y.o. female who was seen by synchronous (real-time) audio-video technology on 5/24/2022. Pt went to [facility] in April with fluid on heart and lungs. She was started on Bumex 0.5 mg daily, which has helped. She had labs while in the hospital, but is unsure what was ordered. Pt is not taking lisinopril, per the recommendation of Dr. Garfield Sahni, oncologist due to episodes of dizziness. SOB: Since starting Bumex she is able to breath better, but continues to have SOB. She has portable O2. She notes she is mostly confined to house because the device only has a 2 hour charge. COPD: Pt is back using Symbicort 160-4.5mcg/actuation 2 puffs BID. This was the plan from her pulmonologist.      Anxiety: Pt continues with Xanax 0.5mg BID PRN. Pt requests a refill of their medication, which I have granted. PAD (peripheral artery disease): Pt continues with Plavix 75mg daily. Pt requests a refill of their medication, which I have granted. RLS (restless legs syndrome): Pt continues with gabapentin 300mg 1 tab in AM and 2 tab at night. Pt requests a refill of their medication, which I have granted. Hypercholesterolemia: Lipid panel on 2/26/20 notable for total cholesterol 165, HDL 60, LDL 79, and triglycerides 132. Pt continues with simvastatin 40mg daily. Pt is due for updated labs. I have asked the patient to have this done. Health Maintenance: Pt is due for mammogram and colonoscopy. We will discuss these at another time. Consent:  Services were provided through a video synchronous discussion virtually to substitute for in-person appointment. She and/or her healthcare decision maker is aware that this patient-initiated Telehealth encounter is a billable service, with coverage as determined by her insurance carrier.  She is aware that she may receive a bill and has provided verbal consent to proceed: Yes    I was in the office while conducting this encounter. Subjective:   Sherry Sewell was seen for Follow Up Chronic Condition, Medication Refill, and Cholesterol Problem      Prior to Admission medications    Medication Sig Start Date End Date Taking? Authorizing Provider   ALPRAZolam (XANAX) 0.5 mg tablet TAKE 1 TABLET BY MOUTH TWICE DAILY AS NEEDED FOR ANXIETY. MAX DAILY AMOUNT: 1 MG 6/2/22  Yes Daniele Back MD   gabapentin (NEURONTIN) 300 mg capsule One in the AM and two at night  Indications: neuropathic pain 5/24/22  Yes Daniele Back MD   clopidogreL (PLAVIX) 75 mg tab Take 1 Tablet by mouth daily. 5/24/22  Yes Daniele Back MD   budesonide-formoteroL (SYMBICORT) 160-4.5 mcg/actuation HFAA Take 2 Puffs by inhalation two (2) times a day. 5/24/22  Yes Daniele Back MD   albuterol-ipratropium (DUO-NEB) 2.5 mg-0.5 mg/3 ml nebu USE 3 ML VIA NEBULIZER EVERY 6 HOURS AS NEEDED FOR WHEEZING 5/20/22  Yes Hemant Carballo MD   bumetanide (BUMEX) 0.5 mg tablet Take 1 Tablet by mouth daily.  4/21/22  Yes Daniele Back MD   metoclopramide HCl (REGLAN) 5 mg tablet TAKE 1 TABLET BY MOUTH THREE TIMES DAILY BEFORE MEALS 3/30/22  Yes Daniele Back MD   famotidine (PEPCID) 40 mg tablet TAKE 1 TABLET BY MOUTH EVERY DAY 3/30/22  Yes Daniele Back MD   lisinopriL (PRINIVIL, ZESTRIL) 2.5 mg tablet TAKE 1 TABLET BY MOUTH EVERY DAY 3/30/22  Yes Daniele Back MD   escitalopram oxalate (LEXAPRO) 20 mg tablet TAKE 1 TABLET BY MOUTH EVERY DAY AS NEEDED FOR ANXIETY 3/30/22  Yes Daniele Back MD   albuterol (PROVENTIL HFA, VENTOLIN HFA, PROAIR HFA) 90 mcg/actuation inhaler Take 2 Puffs by inhalation every four (4) hours as needed for Wheezing. 2/21/22  Yes Daniele Back MD   simvastatin (ZOCOR) 40 mg tablet TAKE 1 TABLET BY MOUTH EVERY NIGHT 10/6/21  Yes Daniele Back MD   Oxygen At home 2 liters as needed and QHS   Yes Provider, Historical   Spiriva with HandiHaler 18 mcg inhalation capsule INHALE THE CONTENTS OF 1 CAPSULE VIA INHALATION DEVICE DAILY 5/5/21  Yes Dee Prior, MD   aspirin delayed-release 81 mg tablet 81 mg. 10/2/20  Yes Provider, Historical   nitroglycerin (NITROSTAT) 0.4 mg SL tablet 0.4 mg. Yes Provider, Historical   ALPRAZolam (XANAX) 0.5 mg tablet TAKE 1 TABLET BY MOUTH TWICE DAILY AS NEEDED FOR ANXIETY. MAX DAILY AMOUNT: 1 MG 5/3/22 5/24/22  Earl Carballo MD   gabapentin (NEURONTIN) 300 mg capsule One in the AM and two at night  Indications: neuropathic pain 2/21/22 5/24/22  Dee Coffman, MD   clopidogreL (PLAVIX) 75 mg tab TAKE 1 TABLET BY MOUTH DAILY 2/11/22 5/24/22  Earl Carballo MD   formoterol (Perforomist) 20 mcg/2 mL nebu neb solution 2 mL by Nebulization route two (2) times a day.  Dx:J44.9 12/8/21 5/24/22  Nereyda Bonner NP     Allergies   Allergen Reactions    Lemon Oil Nausea and Vomiting    Amoxil [Amoxicillin] Diarrhea    Anoro Ellipta [Umeclidinium-Vilanterol] Rash    Codeine Nausea and Vomiting    Fluticasone-Umeclidin-Vilanter Rash    Metoprolol Nausea and Vomiting     Past Medical History:   Diagnosis Date    Acute respiratory failure with hypoxia ([facility] 01/2020    Adenoma of right lung 2018    CAD (coronary artery disease)     stents 2005 and 2009    Depression     Esophageal ulcer 01/2020    GERD (gastroesophageal reflux disease)     Hypercholesterolemia     elevated particle number    Hypertension     MI (myocardial infarction) ([address] 2005, 2009    Non-small cell cancer of right lung ([address] 08/2015    Osteoarthritis     Over weight 8/21/2017    PAD (peripheral artery disease) ([address] 2017    TIA (transient ischemic attack)     Tobacco abuse 2/28/2014     Past Surgical History:   Procedure Laterality Date    HAND/FINGER SURGERY UNLISTED      left hand     HX CHOLECYSTECTOMY  2002    HX COLONOSCOPY  06/2015    HX ENDOSCOPY  2014, 2020    HX HEART CATHETERIZATION  2005, 2009    HX HIP REPLACEMENT Bilateral     HX PATRICIA AND BSO  2007        HX VASCULAR ANGIOPLASTY Bilateral 2017    right SFA, left politeal artery     Family History   Problem Relation Age of Onset    Cancer Mother         breast,ovarian colon     Hypertension Father     Stroke Father     Cancer Maternal Grandmother         colon    Cancer Maternal Grandfather      Social History     Tobacco Use    Smoking status: Current Every Day Smoker     Packs/day: 1.00     Years: 40.00     Pack years: 40.00     Types: Cigarettes     Last attempt to quit: 2020     Years since quittin.3    Smokeless tobacco: Never Used   Substance Use Topics    Alcohol use: No        Review of Systems   Constitutional: Negative for chills and fever. HENT: Negative for hearing loss and tinnitus. Eyes: Negative for blurred vision and double vision. Respiratory: Positive for shortness of breath. Negative for cough. Cardiovascular: Negative for chest pain and palpitations. Gastrointestinal: Negative for nausea and vomiting. Genitourinary: Negative for dysuria and frequency. Musculoskeletal: Negative for back pain and falls. Skin: Negative for itching and rash. Neurological: Negative for dizziness, loss of consciousness and headaches. Endo/Heme/Allergies: Negative. Psychiatric/Behavioral: Negative for depression. The patient is not nervous/anxious. PHYSICAL EXAMINATION:  Vital Signs: (As obtained by patient/caregiver at home)  There were no vitals taken for this visit.      Constitutional: [x] Appears well-developed and well-nourished [x] No apparent distress      [] Abnormal -     Mental status: [x] Alert and awake  [x] Oriented to person/place/time [x] Able to follow commands    [] Abnormal -     Eyes:   EOM    [x]  Normal    [] Abnormal -   Sclera  [x]  Normal    [] Abnormal -          Discharge [x]  None visible   [] Abnormal -     HENT: [x] Normocephalic, atraumatic  [] Abnormal -   [x] Mouth/Throat: Mucous membranes are moist    External Ears [x] Normal  [] Abnormal -    Neck: [x] No visualized mass [] Abnormal -     Pulmonary/Chest: [x] Respiratory effort normal   [x] No visualized signs of difficulty breathing or respiratory distress        [] Abnormal -      Musculoskeletal:   [x] Normal gait with no signs of ataxia         [x] Normal range of motion of neck        [] Abnormal -     Neurological:        [x] No Facial Asymmetry (Cranial nerve 7 motor function) (limited exam due to video visit)          [x] No gaze palsy        [] Abnormal -          Skin:        [x] No significant exanthematous lesions or discoloration noted on facial skin         [] Abnormal -            Psychiatric:       [x] Normal Affect [] Abnormal -        [x] No Hallucinations    Other pertinent observable physical exam findings:-    Assessment & Plan:   Diagnoses and all orders for this visit:    1. Chronic combined systolic and diastolic congestive heart failure (HCC)  Continue current regimen. 2. Chronic obstructive pulmonary disease, unspecified COPD type ([facility]  Pt continues with Symbicort 160-4.5mcg/actuation 2 puffs BID. Pt requests a refill of their medication, which I have granted. -     budesonide-formoteroL (SYMBICORT) 160-4.5 mcg/actuation HFAA; Take 2 Puffs by inhalation two (2) times a day. 3. SOB (shortness of breath)  Continue current regimen. 4. Non-small cell cancer of right lung ([facility] [address]  This condition is managed by Specialist.     5. Hypercholesterolemia  Lipid panel on 2/26/20 notable for total cholesterol 165, HDL 60, LDL 79, and triglycerides 132. Pt continues with simvastatin 40mg daily. Pt is due for updated labs. I have asked the patient to have this done. 6. Anxiety  Pt continues with Xanax 0.5mg BID PRN. Pt requests a refill of their medication, which I have granted. The Prescription Monitoring Program has been reviewed for recent activity regarding controlled substances for this patient. -     ALPRAZolam (XANAX) 0.5 mg tablet; TAKE 1 TABLET BY MOUTH TWICE DAILY AS NEEDED FOR ANXIETY.  MAX DAILY AMOUNT: 1 MG    7. RLS (restless legs syndrome)  Pt continues with gabapentin 300mg 1 tab in AM and 2 tab at night. Pt requests a refill of their medication, which I have granted. -     gabapentin (NEURONTIN) 300 mg capsule; One in the AM and two at night  Indications: neuropathic pain    8. PAD (peripheral artery disease) ([facility] [address]  Pt continues with Plavix 75mg daily. Pt requests a refill of their medication, which I have granted. -     clopidogreL (PLAVIX) 75 mg tab; Take 1 Tablet by mouth daily. Follow-up and Dispositions    · Return in about 3 months (around 8/24/2022). We discussed the expected course, resolution and complications of the diagnosis(es) in detail. Medication risks, benefits, costs, interactions, and alternatives were discussed as indicated. I advised her to contact the office if her condition worsens, changes or fails to improve as anticipated. She expressed understanding with the diagnosis(es) and plan. Pursuant to the emergency declaration under the [address]  [address] [place] [address] waiver authority and the [employer] Act, this Virtual Visit was conducted, with patient's consent, to reduce the patient's risk of exposure to COVID-19 and provide continuity of care for an established patient. Services were provided through a video synchronous discussion virtually to substitute for in-person clinic visit. Written by tracey Allen, as dictated by Giovana Peralta M.D. Total time spent with the patient 15 minutes, greater than 50% of time spent counseling patient.

## 2022-06-13 LAB — SARS-COV-2, NAA: NEGATIVE

## 2022-06-24 DIAGNOSIS — K21.9 GASTROESOPHAGEAL REFLUX DISEASE WITHOUT ESOPHAGITIS: ICD-10-CM

## 2022-06-24 DIAGNOSIS — E78.00 HYPERCHOLESTEROLEMIA: ICD-10-CM

## 2022-06-25 RX ORDER — FAMOTIDINE 40 MG/1
TABLET, FILM COATED ORAL
Qty: 90 TABLET | Refills: 0 | Status: SHIPPED | OUTPATIENT
Start: 2022-06-25 | End: 2022-09-09

## 2022-06-25 RX ORDER — SIMVASTATIN 40 MG/1
TABLET, FILM COATED ORAL
Qty: 90 TABLET | Refills: 0 | Status: SHIPPED | OUTPATIENT
Start: 2022-06-25 | End: 2022-09-12

## 2022-06-25 RX ORDER — METOCLOPRAMIDE 5 MG/1
TABLET ORAL
Qty: 270 TABLET | Refills: 0 | Status: SHIPPED | OUTPATIENT
Start: 2022-06-25 | End: 2022-09-09

## 2022-06-25 RX ORDER — LISINOPRIL 2.5 MG/1
TABLET ORAL
Qty: 90 TABLET | Refills: 0 | Status: SHIPPED | OUTPATIENT
Start: 2022-06-25 | End: 2022-09-09

## 2022-06-28 DIAGNOSIS — J44.9 CHRONIC OBSTRUCTIVE PULMONARY DISEASE, UNSPECIFIED COPD TYPE (HCC): ICD-10-CM

## 2022-06-28 DIAGNOSIS — F41.9 ANXIETY: ICD-10-CM

## 2022-06-28 RX ORDER — ALBUTEROL SULFATE 90 UG/1
2 AEROSOL, METERED RESPIRATORY (INHALATION)
Qty: 8.5 G | Refills: 1 | Status: SHIPPED | OUTPATIENT
Start: 2022-06-28

## 2022-06-28 RX ORDER — ALPRAZOLAM 0.5 MG/1
TABLET ORAL
Qty: 60 TABLET | Refills: 0 | Status: CANCELLED | OUTPATIENT
Start: 2022-06-28

## 2022-06-28 NOTE — TELEPHONE ENCOUNTER
Patient advised she will need to seek appointment with [facility] or [facility].   For URI SX   Patient is requesting [facility] fill meds until he returns

## 2022-06-28 NOTE — TELEPHONE ENCOUNTER
MD Carballo,    Patient call stating she had been in the hospital a couple of times with fluid on her lungs and heart and they prescribed erythromycin and prednisone but only for a couple of days. She is stating to have COPD worse again and swelling in her feet/ankles. She is asking for a refill of both and also a callback. [phone].   Thanks, Jules Pelayo

## 2022-06-28 NOTE — TELEPHONE ENCOUNTER
Albuterol refilled. The Xanax was filled on 6/4/22 for 60 tab with two refills. Why is an early refill being requested?      Gagan Delgado MD

## 2022-07-12 NOTE — TELEPHONE ENCOUNTER
Requested Prescriptions     Pending Prescriptions Disp Refills    bumetanide (BUMEX) 0.5 mg tablet [Pharmacy Med Name: BUMETANIDE 0.5MG TABLETS] 90 Tablet 0     Sig: TAKE 1 TABLET BY MOUTH DAILY

## 2022-07-13 RX ORDER — BUMETANIDE 0.5 MG/1
0.5 TABLET ORAL DAILY
Qty: 90 TABLET | Refills: 0 | Status: SHIPPED | OUTPATIENT
Start: 2022-07-13

## 2022-08-22 ENCOUNTER — OFFICE VISIT (OUTPATIENT)
Dept: FAMILY MEDICINE CLINIC | Age: 52
End: 2022-08-22
Payer: MEDICARE

## 2022-08-22 VITALS
RESPIRATION RATE: 16 BRPM | SYSTOLIC BLOOD PRESSURE: 100 MMHG | TEMPERATURE: 98.7 F | DIASTOLIC BLOOD PRESSURE: 62 MMHG | WEIGHT: 125 LBS | OXYGEN SATURATION: 95 % | BODY MASS INDEX: 21.34 KG/M2 | HEIGHT: 64 IN | HEART RATE: 92 BPM

## 2022-08-22 DIAGNOSIS — R06.02 SOB (SHORTNESS OF BREATH): ICD-10-CM

## 2022-08-22 DIAGNOSIS — R53.81 MALAISE: ICD-10-CM

## 2022-08-22 DIAGNOSIS — Z00.00 MEDICARE ANNUAL WELLNESS VISIT, INITIAL: ICD-10-CM

## 2022-08-22 DIAGNOSIS — F41.9 ANXIETY: ICD-10-CM

## 2022-08-22 DIAGNOSIS — J44.9 CHRONIC OBSTRUCTIVE PULMONARY DISEASE, UNSPECIFIED COPD TYPE (HCC): Primary | ICD-10-CM

## 2022-08-22 DIAGNOSIS — E78.00 HYPERCHOLESTEROLEMIA: ICD-10-CM

## 2022-08-22 DIAGNOSIS — I50.42 CHRONIC COMBINED SYSTOLIC AND DIASTOLIC CONGESTIVE HEART FAILURE (HCC): ICD-10-CM

## 2022-08-22 DIAGNOSIS — C34.91 NON-SMALL CELL CANCER OF RIGHT LUNG (HCC): ICD-10-CM

## 2022-08-22 DIAGNOSIS — G25.81 RLS (RESTLESS LEGS SYNDROME): ICD-10-CM

## 2022-08-22 PROCEDURE — 99214 OFFICE O/P EST MOD 30 MIN: CPT | Performed by: FAMILY MEDICINE

## 2022-08-22 PROCEDURE — G8754 DIAS BP LESS 90: HCPCS | Performed by: FAMILY MEDICINE

## 2022-08-22 PROCEDURE — G8420 CALC BMI NORM PARAMETERS: HCPCS | Performed by: FAMILY MEDICINE

## 2022-08-22 PROCEDURE — G9717 DOC PT DX DEP/BP F/U NT REQ: HCPCS | Performed by: FAMILY MEDICINE

## 2022-08-22 PROCEDURE — G0439 PPPS, SUBSEQ VISIT: HCPCS | Performed by: FAMILY MEDICINE

## 2022-08-22 PROCEDURE — 3017F COLORECTAL CA SCREEN DOC REV: CPT | Performed by: FAMILY MEDICINE

## 2022-08-22 PROCEDURE — G8427 DOCREV CUR MEDS BY ELIG CLIN: HCPCS | Performed by: FAMILY MEDICINE

## 2022-08-22 PROCEDURE — G8752 SYS BP LESS 140: HCPCS | Performed by: FAMILY MEDICINE

## 2022-08-22 PROCEDURE — G9899 SCRN MAM PERF RSLTS DOC: HCPCS | Performed by: FAMILY MEDICINE

## 2022-08-22 RX ORDER — ALPRAZOLAM 0.5 MG/1
TABLET ORAL
Qty: 60 TABLET | Refills: 2 | Status: SHIPPED | OUTPATIENT
Start: 2022-09-02

## 2022-08-22 RX ORDER — DOXYCYCLINE 100 MG/1
100 CAPSULE ORAL 2 TIMES DAILY
COMMUNITY

## 2022-08-22 RX ORDER — LEVALBUTEROL INHALATION SOLUTION 1.25 MG/3ML
1.25 SOLUTION RESPIRATORY (INHALATION)
Qty: 30 EACH | Refills: 5 | Status: SHIPPED | OUTPATIENT
Start: 2022-08-22

## 2022-08-22 RX ORDER — GABAPENTIN 300 MG/1
CAPSULE ORAL
Qty: 270 CAPSULE | Refills: 2 | Status: SHIPPED | OUTPATIENT
Start: 2022-08-22

## 2022-08-22 NOTE — PROGRESS NOTES
HPI  Kiki Sewell 45 y.o. female  presents to the office today for AWV. Pt is accompanied by her friend today. Blood pressure 100/62, pulse 92, temperature 98.7 °F (37.1 °C), temperature source Temporal, resp. rate 16, height 5' 4\" (1.626 m), weight 125 lb (56.7 kg), SpO2 95 %. Body mass index is 21.46 kg/m². Chief Complaint   Patient presents with    Annual Wellness Visit      Hypercholesterolemia: Lipid panel on 2/26/20 notable for total cholesterol 165, HDL 60, LDL 79, and triglycerides 132. Pt continues with simvastatin 40mg daily. COPD: Pt now on 3L oxygen as of a few weeks ago. Her O2 sat without oxygen is in 80s. Pt saw pulmonologist, Dr. Dwayne Skiff last week and will see again in 3 months. Pt was started on doxycycline for chest congestion. Pt reports in general, she feels about 5/10. She continues to smoke. She reports she is not in any pain. Pt had chest CT with Dr. Beulah Anne, oncologist. Pt not interested in discussing smoking cessation today. Pt's weight today is 125lb, down from 133lb on 5/4/22. Pt's weight has been declining. Pt reports she is eating well. She is interested at-home pulmonary PT and would like a referral, which I granted. We also discussed starting palliative care. I referred her to Dr. Rustam Bryant, palliative medicine. Pt uses her nebulizer (Duo-neb) during the day and albuterol inhaler at night. Pt continues with Symbicort 160-4.5mcg/actuation 2 puffs BID. I have rx'd Xopnex 1.25mg/3mL 3mL every 4 hours as needed. Anxiety: Pt continues with Xanax 0.5mg BID PRN. RLS (restless legs syndrome): Pt continues with gabapentin 300mg 1 tab in AM and 2 tab at night. Pt would like to wait until next visit to discuss care gaps/vaccines. Current Outpatient Medications   Medication Sig Dispense Refill    doxycycline (MONODOX) 100 mg capsule Take 100 mg by mouth two (2) times a day.  Indications: upper respiratory infection caused by Pneumococcus      ropinirole HCl (REQUIP PO) Take  by mouth three (3) times daily. 0.5 mg po tid      levalbuterol (XOPENEX) 1.25 mg/3 mL nebu 3 mL by Nebulization route every four (4) hours as needed for Wheezing or Shortness of Breath. 30 Each 5    [START ON 9/2/2022] ALPRAZolam (XANAX) 0.5 mg tablet TAKE 1 TABLET BY MOUTH TWICE DAILY AS NEEDED FOR ANXIETY. MAX DAILY AMOUNT: 1 MG 60 Tablet 2    gabapentin (NEURONTIN) 300 mg capsule One in the AM and two at night  Indications: neuropathic pain 270 Capsule 2    bumetanide (BUMEX) 0.5 mg tablet TAKE 1 TABLET BY MOUTH DAILY 90 Tablet 0    albuterol (PROVENTIL HFA, VENTOLIN HFA, PROAIR HFA) 90 mcg/actuation inhaler Take 2 Puffs by inhalation every four (4) hours as needed for Wheezing. 8.5 g 1    simvastatin (ZOCOR) 40 mg tablet TAKE 1 TABLET BY MOUTH EVERY NIGHT 90 Tablet 0    metoclopramide HCl (REGLAN) 5 mg tablet TAKE 1 TABLET BY MOUTH THREE TIMES DAILY BEFORE MEALS 270 Tablet 0    famotidine (PEPCID) 40 mg tablet TAKE 1 TABLET BY MOUTH EVERY DAY 90 Tablet 0    clopidogreL (PLAVIX) 75 mg tab Take 1 Tablet by mouth daily. 90 Tablet 1    albuterol-ipratropium (DUO-NEB) 2.5 mg-0.5 mg/3 ml nebu USE 3 ML VIA NEBULIZER EVERY 6 HOURS AS NEEDED FOR WHEEZING 90 mL 5    escitalopram oxalate (LEXAPRO) 20 mg tablet TAKE 1 TABLET BY MOUTH EVERY DAY AS NEEDED FOR ANXIETY 90 Tablet 1    Oxygen At home 2 liters as needed and QHS      aspirin delayed-release 81 mg tablet 81 mg.      nitroglycerin (NITROSTAT) 0.4 mg SL tablet 0.4 mg.      lisinopriL (PRINIVIL, ZESTRIL) 2.5 mg tablet TAKE 1 TABLET BY MOUTH EVERY DAY (Patient not taking: Reported on 8/22/2022) 90 Tablet 0    budesonide-formoteroL (SYMBICORT) 160-4.5 mcg/actuation HFAA Take 2 Puffs by inhalation two (2) times a day.  (Patient not taking: Reported on 8/22/2022) 10.2 g 0    Spiriva with HandiHaler 18 mcg inhalation capsule INHALE THE CONTENTS OF 1 CAPSULE VIA INHALATION DEVICE DAILY (Patient not taking: Reported on 8/22/2022) 30 Cap 5     Allergies Allergen Reactions    Lemon Oil Nausea and Vomiting    Amoxil [Amoxicillin] Diarrhea    Anoro Ellipta [Umeclidinium-Vilanterol] Rash    Codeine Nausea and Vomiting    Fluticasone-Umeclidin-Vilanter Rash    Metoprolol Nausea and Vomiting     Past Medical History:   Diagnosis Date    Acute respiratory failure with hypoxia ([facility] 2020    Adenoma of right lung     CAD (coronary artery disease)     stents  and     Depression     Esophageal ulcer 2020    GERD (gastroesophageal reflux disease)     Hypercholesterolemia     elevated particle number    Hypertension     MI (myocardial infarction) ([facility] [address] ,     Non-small cell cancer of right lung ([facility] [address] 2015    Osteoarthritis     Over weight 2017    PAD [facility]per[facility]     TIA (transient ischemic attack)     Tobacco abuse 2014     Past Surgical History:   Procedure Laterality Date    HAND/FINGER SURGERY UNLISTED      left hand     HX CHOLECYSTECTOMY      HX COLONOSCOPY  2015    HX ENDOSCOPY  ,     HX HEART CATHETERIZATION  ,     HX HIP REPLACEMENT Bilateral     HX PATRICIA AND BSO          HX VASCULAR ANGIOPLASTY Bilateral 2017    right SFA, left politeal artery     Family History   Problem Relation Age of Onset    Cancer Mother         breast,ovarian colon     Hypertension Father     Stroke Father     Cancer Maternal Grandmother         colon    Cancer Maternal Grandfather      Social History     Tobacco Use    Smoking status: Every Day     Packs/day: 1.00     Years: 40.00     Pack years: 40.00     Types: Cigarettes     Last attempt to quit: 2020     Years since quittin.6    Smokeless tobacco: Never   Substance Use Topics    Alcohol use: No        Review of Systems   Constitutional:  Negative for chills and fever. HENT:  Negative for hearing loss and tinnitus. Eyes:  Negative for blurred vision and double vision. Respiratory:  Negative for cough.     Cardiovascular:  Negative for chest pain and palpitations. Gastrointestinal:  Negative for nausea and vomiting. Genitourinary:  Negative for dysuria and frequency. Musculoskeletal:  Negative for back pain and falls. Skin:  Negative for itching and rash. Neurological:  Negative for dizziness, loss of consciousness and headaches. Endo/Heme/Allergies: Negative. Psychiatric/Behavioral:  Negative for depression. The patient is not nervous/anxious. Physical Exam  Vitals and nursing note reviewed. Constitutional:       General: She is not in acute distress. Appearance: She is well-developed. She is not diaphoretic. HENT:      Head: Normocephalic and atraumatic. Cardiovascular:      Rate and Rhythm: Normal rate and regular rhythm. Heart sounds: Normal heart sounds. No murmur heard. No friction rub. No gallop. Pulmonary:      Effort: Pulmonary effort is normal. No respiratory distress. Breath sounds: Rhonchi and rales present. No wheezing. Chest:      Chest wall: No tenderness. Abdominal:      General: Bowel sounds are normal. There is no distension. Palpations: Abdomen is soft. Tenderness: There is no abdominal tenderness. Musculoskeletal:         General: Normal range of motion. Cervical back: Normal range of motion and neck supple. Neurological:      Mental Status: She is alert and oriented to person, place, and time. Psychiatric:         Behavior: Behavior normal.         Thought Content: Thought content normal.         Judgment: Judgment normal.       ASSESSMENT and PLAN  Diagnoses and all orders for this visit:    1. Chronic obstructive pulmonary disease, unspecified COPD type ([facility] [address]  She is interested at-home pulmonary PT and would like a referral, which I granted. We also discussed starting palliative care. I referred her to Dr. Lupe Seo, palliative medicine. Pt will continue with her current medications.  Additionally, I have rx'd Xopnex 1.25mg/3mL 3mL every 4 hours as needed. Pt will have updated lab work performed during [address]; Future  -     CBC WITH AUTOMATED DIFF; Future  -     levalbuterol (XOPENEX) 1.25 mg/3 mL nebu; 3 mL by Nebulization route every four (4) hours as needed for Wheezing or Shortness of Breath. 2. SOB (shortness of breath)  See #1.   -     REFERRAL TO [address] [place] [address]  -     CBC WITH AUTOMATED DIFF; Future  -     levalbuterol (XOPENEX) 1.25 mg/3 mL nebu; 3 mL by Nebulization route every four (4) hours as needed for Wheezing or Shortness of Breath. 3. Non-small cell cancer of right lung (HCC)  Pt under the care of Dr. Elayne Shaikh, oncologist.  We also discussed starting palliative care. I referred her to Dr. Maynor Hager, palliative medicine.  -     REFERRAL TO PALLIATIVE MEDICINE    4. Chronic combined systolic and diastolic congestive heart failure (HCC)  I referred her to Dr. Maynor Hager, palliative medicine.  -     REFERRAL TO PALLIATIVE MEDICINE    5. Malaise  Pt reports that they have been experiencing increased fatigue and malaise throughout the day. I have placed orders for labwork to be performed to further assess what may be causing pt this discomfort.   -     TSH 3RD GENERATION; Future  -     T4, FREE; Future    6. Hypercholesterolemia  Lipid panel on 2/26/20 notable for total cholesterol 165, HDL 60, LDL 79, and triglycerides 132. Pt continues with simvastatin 40mg daily. Pt will have updated lab work performed during today's OV.   -     LIPID PANEL; Future    7. Anxiety  Pt continues with Xanax 0.5mg BID PRN. Pt requests a refill of their medication, which I have granted. -     ALPRAZolam (XANAX) 0.5 mg tablet; TAKE 1 TABLET BY MOUTH TWICE DAILY AS NEEDED FOR ANXIETY. MAX DAILY AMOUNT: 1 MG    8. RLS (restless legs syndrome)  Pt continues with gabapentin 300mg 1 tab in AM and 2 tab at night.  Pt requests a refill of their medication, which I have granted. -     gabapentin (NEURONTIN) 300 mg capsule; One in the AM and two at night  Indications: neuropathic pain    9. Medicare annual wellness visit, initial  Medicare questionnaire discussed and responses reviewed today in office. Follow-up and Dispositions    Return in about 3 months (around 11/22/2022) for anxiety/depression. Medication risks/benefits/costs/interactions/alternatives discussed with patient. Advised patient to call back or return to office if symptoms worsen/change/persist.  If patient cannot reach us or should anything more severe/urgent arise he/she should proceed directly to the nearest emergency department. Discussed expected course/resolution/complications of diagnosis in detail with patient. Patient given a written after visit summary which includes diagnoses, current medications and vitals. Patient expressed understanding with the diagnosis and plan. Written by tracey Evans, as dictated by Aj Dacosta M.D.    11:40 AM - 12:11 PM    Total time spent with the patient 30 minutes, greater than 50% of time spent counseling patient.

## 2022-08-22 NOTE — PROGRESS NOTES
Chief Complaint   Patient presents with    Annual Wellness Visit     1. \"Have you been to the ER, urgent care clinic since your last visit? Hospitalized since your last visit? \" April in hospital fluid on lungs and heart [facility]     2. \"Have you seen or consulted any other health care providers outside of the [address] [place] [address] since your last visit? \"    Pulmonary, Cardiology, Oncology Urology     3. For patients aged 39-70: Has the patient had a colonoscopy / FIT/ Cologuard? Has gap not done       If the patient is female:    4. For patients aged 41-77: Has the patient had a mammogram within the past 2 years? Has gap       5. For patients aged 21-65: Has the patient had a pap smear?  No gap     On oxygen 3 liters per [place]

## 2022-08-22 NOTE — PROGRESS NOTES
This is the Subsequent Medicare Annual Wellness Exam, performed 12 months or more after the Initial AWV or the last Subsequent AWV    I have reviewed the patient's medical history in detail and updated the computerized patient record. Assessment/Plan   Education and counseling provided:  Are appropriate based on today's review and evaluation    1. Chronic obstructive pulmonary disease, unspecified COPD type ([facility]  -     [address]; Future  -     CBC WITH AUTOMATED DIFF; Future  -     levalbuterol (XOPENEX) 1.25 mg/3 mL nebu; 3 mL by Nebulization route every four (4) hours as needed for Wheezing or Shortness of Breath., Normal, Disp-30 Each, R-5  2. SOB (shortness of breath)  -     REFERRAL TO [address] [place] [address]  -     CBC WITH AUTOMATED DIFF; Future  -     levalbuterol (XOPENEX) 1.25 mg/3 mL nebu; 3 mL by Nebulization route every four (4) hours as needed for Wheezing or Shortness of Breath., Normal, Disp-30 Each, R-5  3. Non-small cell cancer of right lung ([facility]  -     REFERRAL TO PALLIATIVE MEDICINE  4. Chronic combined systolic and diastolic congestive heart failure (HCC)  -     REFERRAL TO PALLIATIVE MEDICINE  5. Malaise  -     TSH 3RD GENERATION; Future  -     T4, FREE; Future  6. Hypercholesterolemia  -     LIPID PANEL; Future  7. Anxiety  -     ALPRAZolam (XANAX) 0.5 mg tablet; TAKE 1 TABLET BY MOUTH TWICE DAILY AS NEEDED FOR ANXIETY. MAX DAILY AMOUNT: 1 MG, Normal, Disp-60 Tablet, R-2  8. RLS (restless legs syndrome)  -     gabapentin (NEURONTIN) 300 mg capsule;  One in the AM and two at night  Indications: neuropathic pain, Normal, Disp-270 Capsule, R-2       Depression Risk Factor Screening     3 most recent PHQ Screens 8/22/2022   Little interest or pleasure in doing things Nearly every day   Feeling down, depressed, irritable, or hopeless More than half the days   Total Score PHQ 2 5   Trouble falling or staying asleep, or sleeping too much Nearly every day   Feeling tired or having little energy Nearly every day   Poor appetite, weight loss, or overeating Nearly every day   Feeling bad about yourself - or that you are a failure or have let yourself or your family down Nearly every day   Trouble concentrating on things such as school, work, reading, or watching TV -   Moving or speaking so slowly that other people could have noticed; or the opposite being so fidgety that others notice Not at all   Thoughts of being better off dead, or hurting yourself in some way Not at all   PHQ 9 Score -   How difficult have these problems made it for you to do your work, take care of your home and get along with others Very difficult       Alcohol & Drug Abuse Risk Screen    Do you average more than 1 drink per night or more than 7 drinks a week:  No    On any one occasion in the past three months have you have had more than 3 drinks containing alcohol:  No          Functional Ability and Level of Safety    Hearing: Hearing is good. Activities of Daily Living: The home contains: no safety equipment. Patient does total self care      Ambulation: with mild difficulty     Fall Risk:  Fall Risk Assessment, last 12 mths 8/22/2022   Able to walk? Yes   Fall in past 12 months? 0   Do you feel unsteady? 1   Are you worried about falling 0   Is TUG test greater than 12 seconds?  0   Is the gait abnormal? 0      Abuse Screen:  Patient is not abused       Cognitive Screening    Has your family/caregiver stated any concerns about your memory: no         Health Maintenance Due     Health Maintenance Due   Topic Date Due    Colorectal Cancer Screening Combo  Never done    Breast Cancer Screen Mammogram  Never done    Lipid Screen  02/26/2021    Medicare Yearly Exam  08/25/2022       Patient Care Team   Patient Care Team:  Atiya Long MD as PCP - General (Family Medicine)  Atiya Long MD as PCP - REHABILITATION [facility] Empaneled Provider    History     Patient Active Problem List   Diagnosis Code    GERD (gastroesophageal reflux disease) K21.9    Mixed anxiety and depressive disorder F41.8    CAD (coronary artery disease) I25.10    Hypercholesterolemia E78.00    Post traumatic stress disorder (PTSD) F43.10    S/P hip replacement Z96.649    Polycystic ovaries E28.2    Malignant neoplasm of upper lobe of right lung (HCC) C34.11    HTN (hypertension), benign I10    Over weight E66.3    PAD (peripheral artery disease) (HCC) I73.9    Non-small cell cancer of right lung (HCC) C34.91    Centrilobular emphysema (HCC) J43.2    Depression, recurrent ([address] F33.9     Past Medical History:   Diagnosis Date    Acute respiratory failure with hypoxia ([address] 01/2020    Adenoma of right lung 2018    CAD (coronary artery disease)     stents 2005 and 2009    Depression     Esophageal ulcer 01/2020    GERD (gastroesophageal reflux disease)     Hypercholesterolemia     elevated particle number    Hypertension     MI (myocardial infarction) ([address] 2005, 2009    Non-small cell cancer of right lung ([address] 08/2015    Osteoarthritis     Over weight 8/21/2017    PAD (peripheral artery disease) ([address] 2017    TIA (transient ischemic attack)     Tobacco abuse 2/28/2014      Past Surgical History:   Procedure Laterality Date    HAND/FINGER SURGERY UNLISTED      left hand     HX CHOLECYSTECTOMY  2002    HX COLONOSCOPY  06/2015    HX ENDOSCOPY  2014, 2020    HX HEART CATHETERIZATION  2005, 2009    HX HIP REPLACEMENT Bilateral     HX PATRICIA AND BSO  2007        HX VASCULAR ANGIOPLASTY Bilateral 2017    right SFA, left politeal artery     Current Outpatient Medications   Medication Sig Dispense Refill    doxycycline (MONODOX) 100 mg capsule Take 100 mg by mouth two (2) times a day. Indications: upper respiratory infection caused by Pneumococcus      ropinirole HCl (REQUIP PO) Take  by mouth three (3) times daily.  0.5 mg po tid      levalbuterol (XOPENEX) 1.25 mg/3 mL nebu 3 mL by Nebulization route every four (4) hours as needed for Wheezing or Shortness of Breath. 30 Each 5    [START ON 9/2/2022] ALPRAZolam (XANAX) 0.5 mg tablet TAKE 1 TABLET BY MOUTH TWICE DAILY AS NEEDED FOR ANXIETY. MAX DAILY AMOUNT: 1 MG 60 Tablet 2    gabapentin (NEURONTIN) 300 mg capsule One in the AM and two at night  Indications: neuropathic pain 270 Capsule 2    bumetanide (BUMEX) 0.5 mg tablet TAKE 1 TABLET BY MOUTH DAILY 90 Tablet 0    albuterol (PROVENTIL HFA, VENTOLIN HFA, PROAIR HFA) 90 mcg/actuation inhaler Take 2 Puffs by inhalation every four (4) hours as needed for Wheezing. 8.5 g 1    simvastatin (ZOCOR) 40 mg tablet TAKE 1 TABLET BY MOUTH EVERY NIGHT 90 Tablet 0    metoclopramide HCl (REGLAN) 5 mg tablet TAKE 1 TABLET BY MOUTH THREE TIMES DAILY BEFORE MEALS 270 Tablet 0    famotidine (PEPCID) 40 mg tablet TAKE 1 TABLET BY MOUTH EVERY DAY 90 Tablet 0    clopidogreL (PLAVIX) 75 mg tab Take 1 Tablet by mouth daily. 90 Tablet 1    albuterol-ipratropium (DUO-NEB) 2.5 mg-0.5 mg/3 ml nebu USE 3 ML VIA NEBULIZER EVERY 6 HOURS AS NEEDED FOR WHEEZING 90 mL 5    escitalopram oxalate (LEXAPRO) 20 mg tablet TAKE 1 TABLET BY MOUTH EVERY DAY AS NEEDED FOR ANXIETY 90 Tablet 1    Oxygen At home 2 liters as needed and QHS      aspirin delayed-release 81 mg tablet 81 mg.      nitroglycerin (NITROSTAT) 0.4 mg SL tablet 0.4 mg.      lisinopriL (PRINIVIL, ZESTRIL) 2.5 mg tablet TAKE 1 TABLET BY MOUTH EVERY DAY (Patient not taking: Reported on 8/22/2022) 90 Tablet 0    budesonide-formoteroL (SYMBICORT) 160-4.5 mcg/actuation HFAA Take 2 Puffs by inhalation two (2) times a day.  (Patient not taking: Reported on 8/22/2022) 10.2 g 0    Spiriva with HandiHaler 18 mcg inhalation capsule INHALE THE CONTENTS OF 1 CAPSULE VIA INHALATION DEVICE DAILY (Patient not taking: Reported on 8/22/2022) 30 Cap 5     Allergies   Allergen Reactions    Lemon Oil Nausea and Vomiting    Amoxil [Amoxicillin] Diarrhea    Anoro Ellipta [Umeclidinium-Vilanterol] Rash    Codeine Nausea and Vomiting    Fluticasone-Umeclidin-Vilanter Rash    Metoprolol Nausea and Vomiting       Family History   Problem Relation Age of Onset    Cancer Mother         breast,ovarian colon     Hypertension Father     Stroke Father     Cancer Maternal Grandmother         colon    Cancer Maternal Grandfather      Social History     Tobacco Use    Smoking status: Every Day     Packs/day: 1.00     Years: 40.00     Pack years: 40.00     Types: Cigarettes     Last attempt to quit: 2020     Years since quittin.6    Smokeless tobacco: Never   Substance Use Topics    Alcohol use: No         Curt Burris MD

## 2022-08-23 ENCOUNTER — TELEPHONE (OUTPATIENT)
Dept: PALLATIVE CARE | Age: 52
End: 2022-08-23

## 2022-08-23 NOTE — TELEPHONE ENCOUNTER
[facility]  Nursing Note  [phone]  Fax [phone]      Name:  Mk Rojas  YOB: 1970    Received outpatient Palliative Medicine referral from Winnie Colindres MD to see patient for symptom management and supportive care. Chart  reviewed. Mk Rojas is a 45 y.o. female with history of COPD, CHF, NSCLC. Patient's most recent office visit with Dr Krish Batres ([address] visit) was on 8/22/22. Patient is followed by oncologist Dr. Eric Medina at [facility]. Most recent visit was 3/11/22. Mediastinal mass was diagnosed in 2015, biopsy was positive for NSCLC, treated with concurrent chemo and radiation. Scans have showed no clear evidence of disease progression. PET scan on 3/8/22 showed fairly unremarkable ground glass opacity in RUL (9mm ground-glass opacity first appeared on 5/10/21 CT scan, it has grown in size to 2.3 cm as of 2/9/22 CT scan. Dr. Kevin Gaston is continuing to monitor.)    Patient reports that her symptoms are anxiety, weight loss, and SOB. She has had several recent hospitalizations at [facility]. Per flowsheets in chart, patient has lost 60 lbs in the past 15 months. ACP:     No ACP documents on file    This nurse explained that she will discuss her referral with the Palliative physicians at weekly IDT tomorrow and call patient back. Patient voices understanding. Addendum:  8/24/22    [facility] providers agree to see patient for COPD symptom management. Nurse called patient, no answer, left message requesting call back.     Alisha Calixto, ABDONN, RN-BC, [facility]

## 2022-08-29 DIAGNOSIS — J44.9 CHRONIC OBSTRUCTIVE PULMONARY DISEASE, UNSPECIFIED COPD TYPE (HCC): ICD-10-CM

## 2022-08-29 DIAGNOSIS — R06.02 SOB (SHORTNESS OF BREATH): ICD-10-CM

## 2022-08-30 RX ORDER — IPRATROPIUM BROMIDE AND ALBUTEROL SULFATE 2.5; .5 MG/3ML; MG/3ML
SOLUTION RESPIRATORY (INHALATION)
Qty: 90 ML | Refills: 5 | Status: SHIPPED | OUTPATIENT
Start: 2022-08-30

## 2022-09-11 DIAGNOSIS — E78.00 HYPERCHOLESTEROLEMIA: ICD-10-CM

## 2022-09-12 RX ORDER — SIMVASTATIN 40 MG/1
TABLET, FILM COATED ORAL
Qty: 90 TABLET | Refills: 0 | Status: SHIPPED | OUTPATIENT
Start: 2022-09-12

## 2022-09-12 RX ORDER — PANTOPRAZOLE SODIUM 40 MG/1
TABLET, DELAYED RELEASE ORAL
Qty: 90 TABLET | Refills: 0 | Status: SHIPPED | OUTPATIENT
Start: 2022-09-12

## 2022-09-19 ENCOUNTER — TELEPHONE (OUTPATIENT)
Dept: FAMILY MEDICINE CLINIC | Age: 52
End: 2022-09-19

## 2022-09-19 NOTE — TELEPHONE ENCOUNTER
MD Carballo,    Patient call stating she was just in a couple weeks ago with congestion and thought it would get better but it has gotten worse and requesting callback from nurse to obtain prescription for possible sinus infection as now mucus is brown/greenish. [phone]    Asking to call her in something.     Patient seen 8/22/22 by MD Afsahn Lloyd

## 2022-09-22 NOTE — TELEPHONE ENCOUNTER
TC to Patient. ID verified. Patient advises she had a appointment with  a month ago and she mentioned a slight cough and sinus infection. . Advises it has slowly gotten worse. Coughing up Brownish  Secretions. Patient advised she will need to be seen. Patient declined appointment with [facility]. Patient advises she is tired of seeing [facility]. Advised Provider will not send in antibiotic if she is sick with out appointment.

## 2022-09-22 NOTE — TELEPHONE ENCOUNTER
Pt left a voice message following up on the return call from Jacques Rogers nurse regarding a prescription for cold/infection. Pt is asking for a new prescription to be called in for an antibiotic.       [phone]

## 2022-09-26 ENCOUNTER — TELEPHONE (OUTPATIENT)
Dept: FAMILY MEDICINE CLINIC | Age: 52
End: 2022-09-26

## 2022-09-26 DIAGNOSIS — J44.9 CHRONIC OBSTRUCTIVE PULMONARY DISEASE, UNSPECIFIED COPD TYPE (HCC): ICD-10-CM

## 2022-09-26 DIAGNOSIS — R53.81 PHYSICAL DECONDITIONING: ICD-10-CM

## 2022-09-26 DIAGNOSIS — J43.9 PULMONARY EMPHYSEMA, UNSPECIFIED EMPHYSEMA TYPE (HCC): Primary | ICD-10-CM

## 2022-09-26 DIAGNOSIS — R63.4 WEIGHT LOSS: ICD-10-CM

## 2022-09-26 NOTE — TELEPHONE ENCOUNTER
Patient called need to talk to provider about having physical therapy.     Requesting a call back    Best call back #[phone]

## 2022-09-28 ENCOUNTER — HOME HEALTH ADMISSION (OUTPATIENT)
Dept: HOME HEALTH SERVICES | Facility: HOME HEALTH | Age: 52
End: 2022-09-28
Payer: MEDICARE

## 2022-09-28 NOTE — TELEPHONE ENCOUNTER
Order's placed per  Verbal Order. Order placed for Home Health     Patient advises she is loosing weight. Having Deconditioning. Has COPD/Emphysema.   Unable to get out of home except for Doctors Appointments

## 2022-10-03 ENCOUNTER — HOME CARE VISIT (OUTPATIENT)
Dept: SCHEDULING | Facility: HOME HEALTH | Age: 52
End: 2022-10-03
Payer: MEDICARE

## 2022-10-03 PROCEDURE — 400013 HH SOC

## 2022-10-03 PROCEDURE — G0299 HHS/HOSPICE OF RN EA 15 MIN: HCPCS

## 2022-10-05 VITALS
HEART RATE: 97 BPM | RESPIRATION RATE: 16 BRPM | WEIGHT: 125 LBS | BODY MASS INDEX: 21.34 KG/M2 | OXYGEN SATURATION: 97 % | SYSTOLIC BLOOD PRESSURE: 108 MMHG | DIASTOLIC BLOOD PRESSURE: 60 MMHG | HEIGHT: 64 IN | TEMPERATURE: 97.9 F

## 2022-10-06 ENCOUNTER — HOME CARE VISIT (OUTPATIENT)
Dept: HOME HEALTH SERVICES | Facility: HOME HEALTH | Age: 52
End: 2022-10-06
Payer: MEDICARE

## 2022-10-06 ENCOUNTER — HOME CARE VISIT (OUTPATIENT)
Dept: SCHEDULING | Facility: HOME HEALTH | Age: 52
End: 2022-10-06
Payer: MEDICARE

## 2022-10-07 ENCOUNTER — HOME CARE VISIT (OUTPATIENT)
Dept: HOME HEALTH SERVICES | Facility: HOME HEALTH | Age: 52
End: 2022-10-07
Payer: MEDICARE

## 2022-10-07 ENCOUNTER — HOME CARE VISIT (OUTPATIENT)
Dept: SCHEDULING | Facility: HOME HEALTH | Age: 52
End: 2022-10-07
Payer: MEDICARE

## 2022-10-07 PROCEDURE — G0151 HHCP-SERV OF PT,EA 15 MIN: HCPCS

## 2022-10-10 ENCOUNTER — HOME CARE VISIT (OUTPATIENT)
Dept: SCHEDULING | Facility: HOME HEALTH | Age: 52
End: 2022-10-10
Payer: MEDICARE

## 2022-10-10 VITALS
RESPIRATION RATE: 20 BRPM | BODY MASS INDEX: 21.8 KG/M2 | OXYGEN SATURATION: 91 % | TEMPERATURE: 96.7 F | DIASTOLIC BLOOD PRESSURE: 88 MMHG | WEIGHT: 127 LBS | SYSTOLIC BLOOD PRESSURE: 136 MMHG | HEART RATE: 102 BPM

## 2022-10-10 VITALS
DIASTOLIC BLOOD PRESSURE: 70 MMHG | RESPIRATION RATE: 18 BRPM | OXYGEN SATURATION: 93 % | HEART RATE: 101 BPM | TEMPERATURE: 98.4 F | SYSTOLIC BLOOD PRESSURE: 122 MMHG

## 2022-10-10 PROCEDURE — G0152 HHCP-SERV OF OT,EA 15 MIN: HCPCS

## 2022-10-10 PROCEDURE — G0300 HHS/HOSPICE OF LPN EA 15 MIN: HCPCS

## 2022-10-10 PROCEDURE — G0155 HHCP-SVS OF CSW,EA 15 MIN: HCPCS

## 2022-10-13 ENCOUNTER — HOME CARE VISIT (OUTPATIENT)
Dept: SCHEDULING | Facility: HOME HEALTH | Age: 52
End: 2022-10-13
Payer: MEDICARE

## 2022-10-13 PROCEDURE — G0300 HHS/HOSPICE OF LPN EA 15 MIN: HCPCS

## 2022-10-14 ENCOUNTER — HOME CARE VISIT (OUTPATIENT)
Dept: HOME HEALTH SERVICES | Facility: HOME HEALTH | Age: 52
End: 2022-10-14
Payer: MEDICARE

## 2022-10-14 VITALS
RESPIRATION RATE: 22 BRPM | OXYGEN SATURATION: 93 % | DIASTOLIC BLOOD PRESSURE: 70 MMHG | SYSTOLIC BLOOD PRESSURE: 102 MMHG | HEART RATE: 106 BPM | TEMPERATURE: 95.7 F

## 2022-10-14 LAB — SARS-COV-2, NAA: NEGATIVE

## 2022-10-17 ENCOUNTER — HOME CARE VISIT (OUTPATIENT)
Dept: SCHEDULING | Facility: HOME HEALTH | Age: 52
End: 2022-10-17
Payer: MEDICARE

## 2022-10-18 ENCOUNTER — PATIENT OUTREACH (OUTPATIENT)
Dept: CASE MANAGEMENT | Age: 52
End: 2022-10-18

## 2022-10-18 ENCOUNTER — TELEPHONE (OUTPATIENT)
Dept: PALLATIVE CARE | Age: 52
End: 2022-10-18

## 2022-10-18 NOTE — TELEPHONE ENCOUNTER
[facility] Palliative Medicine Office  Nursing Note  [phone]  Fax [phone]     Name:  Daniel Lima  YOB: 1970     Called patient to follow up on outpatient Palliative Medicine referral from Dr. Jorgito Berumen. No answer, left message requesting call back.     ABDON TabaresN, RN  Palliative Medicine  [phone]

## 2022-10-18 NOTE — PROGRESS NOTES
Discharge summary 10/14/22 printed. Pt requesting refill on prednisone and zithromax. Meds prescribed at discharge. Pt states she is \"feeling better. Currently states she is on 4 LPM continuous. Pt declines to move forward with referral to Palliative Care. Pt followed by [facility]. PT/OT/Nursing/SW. Ambulatory Care Management Note    Date/Time:  10/19/2022 1:40 PM     This patient was received as a referral from Provider. Ambulatory Care Manager outreached to patient today to offer care management services. Introduction to self and role of care manager provided. Patient accepted care management services at this time. This [facility] reviewed and updated the following screenings during this call; general assessment, disease specific assessment , and self management assessment    Patient's challenges to self-management identified:   medical condition and multi health system providers    Medication Management:  good adherence and good understanding    Advance Care Planning:   Does patient have an Advance Directive:   no ACP docs noted on file. [employer], Referrals, and Durable Medical Equipment: no      Health Maintenance Due   Topic Date Due    Colorectal Cancer Screening Combo  Never done    Breast Cancer Screen Mammogram  Never done    Lipid Screen  02/26/2021    Flu Vaccine (1) Never done     Health Maintenance Reviewed: no    Top Challenges reviewed with the provider   10/14/22 ED encounter-[facility]. Pt dx: COPD exacerbation. Now on 4LPM continuous. Pt advised to follow up with pulmonary. Discharge summary printed for PCP review. Pt declined referral to Palliative. Does not feel warranted at this time. Ambulatory  contacted patient for discussion and case management of COPD, lung CA, DM, CHF. Summary of patients top problems:   COPD exacerbation: 10/14/22 ED encounter. Pt now on 4 LPM via NC continuous. Followed by pulmonary.  Last o/v note 8/12/22 obtained. Pt advised to call and schedule follow up with PAR. Pt states she has contacted pulmonary to provide update. ACM reiterated need to call for follow up appointment. Pt verbalized understanding. Non small cell cancer of right lung: pt followed by [facility]. Pt declined palliative referral. Pt does not feel there is a need at this time. States she is being followed by H/[facility] PT/OT/Nursing/SW. PCP/Specialist follow up:   Future Appointments   Date Time Provider Stan Schroeder   10/24/2022 To Be Determined Yazmin Burgess RN [address]   10/26/2022 10:00 AM Lisa Stone PT [address]   10/31/2022 To Be Determined [address]   11/7/2022 To Be Determined [facility]   11/14/2022 To Be Determined [address]   11/21/2022 To Be Determined [address]   11/22/2022 11:30 AM Hyder, Evelyne Dance, MD [facility]   11/28/2022 To Be Determined Yazmin Burgess RN [address] [place] [address]   2/27/2023  9:00 AM Hyder, Evelyne Dance, MD PAFP [facility]           Goals        Patient verbalizes understanding of self-management goals of living with COPD.      10/19/22  Pt seen at [facility] EM[facility] ER 10/14/22 dx: COPD exacerbation. O2 sats on arrival in 60's on room air. Pt prescribed prednisone and zithromax. Pt states she is currently on 4 LPM continuous. Reports previously on 3 LPM. \"Feeling a bit better\" requesting refill on both meds. ACM printed discharge summary for PCP review. Pt was advised to follow up with pulmonary and PCP. ACM contacted PAR requesting last o/v note. Pt advised to schedule ED follow up/med refill request with PAR. Pt will call and schedule ED follow up with PAR. ACM outreach 7-14 days. PM               Patient verbalized understanding of all information discussed. Patient has this Ambulatory Care Manager's contact information for any further questions, concerns, or needs.

## 2022-10-19 ENCOUNTER — HOME CARE VISIT (OUTPATIENT)
Dept: SCHEDULING | Facility: HOME HEALTH | Age: 52
End: 2022-10-19
Payer: MEDICARE

## 2022-10-19 VITALS
RESPIRATION RATE: 18 BRPM | TEMPERATURE: 98.7 F | SYSTOLIC BLOOD PRESSURE: 115 MMHG | HEART RATE: 91 BPM | DIASTOLIC BLOOD PRESSURE: 72 MMHG | OXYGEN SATURATION: 97 %

## 2022-10-19 PROCEDURE — G0151 HHCP-SERV OF PT,EA 15 MIN: HCPCS

## 2022-10-24 ENCOUNTER — HOME CARE VISIT (OUTPATIENT)
Dept: SCHEDULING | Facility: HOME HEALTH | Age: 52
End: 2022-10-24
Payer: MEDICARE

## 2022-10-25 ENCOUNTER — HOME CARE VISIT (OUTPATIENT)
Dept: HOME HEALTH SERVICES | Facility: HOME HEALTH | Age: 52
End: 2022-10-25
Payer: MEDICARE

## 2022-10-26 ENCOUNTER — HOME CARE VISIT (OUTPATIENT)
Dept: SCHEDULING | Facility: HOME HEALTH | Age: 52
End: 2022-10-26
Payer: MEDICARE

## 2022-10-26 VITALS
SYSTOLIC BLOOD PRESSURE: 115 MMHG | OXYGEN SATURATION: 89 % | DIASTOLIC BLOOD PRESSURE: 74 MMHG | HEART RATE: 97 BPM | RESPIRATION RATE: 16 BRPM | TEMPERATURE: 98.4 F

## 2022-10-26 PROCEDURE — G0151 HHCP-SERV OF PT,EA 15 MIN: HCPCS

## 2022-10-27 ENCOUNTER — PATIENT OUTREACH (OUTPATIENT)
Dept: CASE MANAGEMENT | Age: 52
End: 2022-10-27

## 2022-10-27 NOTE — PROGRESS NOTES
Goals Addressed                   This Visit's Progress     Patient verbalizes understanding of self-management goals of living with COPD.        10/19/22  Pt seen at [facility] ER 10/14/22 dx: COPD exacerbation. O2 sats on arrival in 60's on room air. Pt prescribed prednisone and zithromax. Pt states she is currently on 4 LPM continuous. Reports previously on 3 LPM. \"Feeling a bit better\" requesting refill on both meds. ACM printed discharge summary for PCP review. Pt was advised to follow up with pulmonary and PCP. ACM contacted PAR requesting last o/v note. Pt advised to schedule ED follow up/med refill request with PAR. Pt will call and schedule ED follow up with PAR. ACM outreach 7-14 days. PM  10/27/22  LVM requesting return call. ACM to update goals.  PM

## 2022-10-28 ENCOUNTER — HOME CARE VISIT (OUTPATIENT)
Dept: SCHEDULING | Facility: HOME HEALTH | Age: 52
End: 2022-10-28
Payer: MEDICARE

## 2022-10-28 VITALS
SYSTOLIC BLOOD PRESSURE: 102 MMHG | DIASTOLIC BLOOD PRESSURE: 67 MMHG | TEMPERATURE: 97.6 F | HEART RATE: 68 BPM | RESPIRATION RATE: 17 BRPM | OXYGEN SATURATION: 92 %

## 2022-10-28 PROCEDURE — G0299 HHS/HOSPICE OF RN EA 15 MIN: HCPCS

## 2022-10-31 ENCOUNTER — HOME CARE VISIT (OUTPATIENT)
Dept: SCHEDULING | Facility: HOME HEALTH | Age: 52
End: 2022-10-31
Payer: MEDICARE

## 2022-10-31 VITALS
RESPIRATION RATE: 18 BRPM | OXYGEN SATURATION: 93 % | WEIGHT: 125 LBS | DIASTOLIC BLOOD PRESSURE: 82 MMHG | BODY MASS INDEX: 21.46 KG/M2 | TEMPERATURE: 98.7 F | HEART RATE: 104 BPM | SYSTOLIC BLOOD PRESSURE: 104 MMHG

## 2022-10-31 PROCEDURE — G0300 HHS/HOSPICE OF LPN EA 15 MIN: HCPCS

## 2022-11-02 ENCOUNTER — HOME CARE VISIT (OUTPATIENT)
Dept: SCHEDULING | Facility: HOME HEALTH | Age: 52
End: 2022-11-02
Payer: MEDICARE

## 2022-11-02 VITALS
OXYGEN SATURATION: 93 % | DIASTOLIC BLOOD PRESSURE: 80 MMHG | RESPIRATION RATE: 16 BRPM | SYSTOLIC BLOOD PRESSURE: 120 MMHG | HEART RATE: 99 BPM | TEMPERATURE: 98.3 F

## 2022-11-02 PROCEDURE — G0151 HHCP-SERV OF PT,EA 15 MIN: HCPCS

## 2022-11-02 PROCEDURE — 400013 HH SOC

## 2022-11-07 ENCOUNTER — HOME CARE VISIT (OUTPATIENT)
Dept: SCHEDULING | Facility: HOME HEALTH | Age: 52
End: 2022-11-07
Payer: MEDICARE

## 2022-11-07 PROCEDURE — G0300 HHS/HOSPICE OF LPN EA 15 MIN: HCPCS

## 2022-11-10 ENCOUNTER — PATIENT OUTREACH (OUTPATIENT)
Dept: CASE MANAGEMENT | Age: 52
End: 2022-11-10

## 2022-11-10 ENCOUNTER — TELEPHONE (OUTPATIENT)
Dept: FAMILY MEDICINE CLINIC | Age: 52
End: 2022-11-10

## 2022-11-10 NOTE — PROGRESS NOTES
Goals Addressed                   This Visit's Progress     Patient verbalizes understanding of self-management goals of living with COPD. Not on track     10/19/22  Pt seen at [facility] ER 10/14/22 dx: COPD exacerbation. O2 sats on arrival in 60's on room air. Pt prescribed prednisone and zithromax. Pt states she is currently on 4 LPM continuous. Reports previously on 3 LPM. \"Feeling a bit better\" requesting refill on both meds. ACM printed discharge summary for PCP review. Pt was advised to follow up with pulmonary and PCP. ACM contacted PAR requesting last o/v note. Pt advised to schedule ED follow up/med refill request with PAR. Pt will call and schedule ED follow up with PAR. ACM outreach 7-14 days. PM  10/27/22  LVM requesting return call. ACM to update goals. PM   11/10/22  Pt states she feels bronchitis is worsening. Reports SOB w/wo exertion. Productive cough. 4 lpm continuous via NC. Pt is requesting refill on prednisone and zithromax. Pt scheduled for VV 11/14/22. Previously prescribed during ER encounter 10/14/22. Pt has not yet heard back from [facility] Monica Andrade regarding scheduling follow up appointment. ACM advised pt to call PAR again. 21-30 days. PM             Heart Failure Education outreach Date/Time: 11/10/2022 12:14 PM    ACM reviewed that a Health Healthy tips for the [employer] packet has been mailed to the them. ACM reviewed CHF zones, daily weights, the importance of low sodium diet with the patient. Instructed patient to call their Cardiologist if they have a weight gain of 3 lbs in 2 days or 5 lbs in a week. Pt is followed by Dr Gabbie Khan. Pt reports compliance with low Na+ diet. Reports daily weights. Taking Bumex PRN for edema. Patient reminded that there is a physician on call 24 hours a day / 7 days a week should the patient have questions or concerns. The patient verbalized understanding.

## 2022-11-10 NOTE — TELEPHONE ENCOUNTER
Marisela called from [facility] Service asking if Dr. Toni Blakely has signed their home health visit. She stated that she will need this signed and sent back if possible.  This letter was sent on 11/7/22    Edmond Martins best call back number  [phone]  Fax  [phone]

## 2022-11-14 ENCOUNTER — VIRTUAL VISIT (OUTPATIENT)
Dept: FAMILY MEDICINE CLINIC | Age: 52
End: 2022-11-14
Payer: MEDICARE

## 2022-11-14 ENCOUNTER — HOME CARE VISIT (OUTPATIENT)
Dept: SCHEDULING | Facility: HOME HEALTH | Age: 52
End: 2022-11-14
Payer: MEDICARE

## 2022-11-14 DIAGNOSIS — J44.9 END STAGE COPD (HCC): ICD-10-CM

## 2022-11-14 DIAGNOSIS — J44.1 COPD EXACERBATION (HCC): Primary | ICD-10-CM

## 2022-11-14 PROCEDURE — 99442 PR PHYS/QHP TELEPHONE EVALUATION 11-20 MIN: CPT | Performed by: STUDENT IN AN ORGANIZED HEALTH CARE EDUCATION/TRAINING PROGRAM

## 2022-11-14 PROCEDURE — G0300 HHS/HOSPICE OF LPN EA 15 MIN: HCPCS

## 2022-11-14 RX ORDER — PREDNISONE 10 MG/1
TABLET ORAL
Qty: 20 TABLET | Refills: 0 | Status: SHIPPED | OUTPATIENT
Start: 2022-11-14 | End: 2022-11-22

## 2022-11-14 RX ORDER — AZITHROMYCIN 250 MG/1
TABLET, FILM COATED ORAL
Qty: 6 TABLET | Refills: 0 | Status: SHIPPED | OUTPATIENT
Start: 2022-11-14 | End: 2022-11-19

## 2022-11-14 RX ORDER — GUAIFENESIN 600 MG/1
600 TABLET, EXTENDED RELEASE ORAL 2 TIMES DAILY
Qty: 14 TABLET | Refills: 0 | Status: SHIPPED | OUTPATIENT
Start: 2022-11-14 | End: 2022-11-22

## 2022-11-14 NOTE — PROGRESS NOTES
Denis Sewell  45 y.o. female  1970  [address]  [id number]    [phone] (home) [phone] (work)     [address]:    Telephone Encounter  Val Lopez       Encounter Date: 11/14/2022    Consent:  She and/or the health care decision maker is aware that that she may receive a bill for this telephone service, depending on her insurance coverage, and has provided verbal consent to proceed: Yes    No chief complaint on file. History of Present Illness   Ilsa Chiang is a 45 y.o. female was evaluated by telephone. Was in hospital for COPD flare up 4 weeks ago ([facility]). Since then has been coughing with increased sputum production and purulence x 1 week. Is on home 4L O2, no more sob than normal and hasn't needed to increase O2. Found 10mg Prednisone and antibiotic in drawer- took 10mg a day for 7 days and finished it a few days ago. Also took augmentin x 7 days. Felt better after with augmentin, but worsened after finishing the course. Still having wheezing. Home health nurse coming once weekly. Review of Systems   See HPI    Vitals/Objective:     General: alert, cooperative, no distress   Mental  status: mental status: alert, oriented to person, place, and time, normal mood, behavior, motor activity, and thought processes   Resp: resp: normal effort and no respiratory distress           Due to this being a TeleHealth evaluation, many elements of the physical examination are unable to be assessed. Assessment and Plan:   Time-based coding, delete if not needed: I spent at least 15 minutes with this established patient, and >50% of the time was spent counseling and/or coordinating care regarding COPD  Total Time: minutes: 11-20 minutes    1. End stage COPD ([address]): On 4L O2 at baseline. Sees Pulm. Has palliative referral.    2. COPD exacerbation ([address]): S/p 7 days of Augmentin.  Will send Z-pack and longer pred taper plus mucinex. - azithromycin (ZITHROMAX) 250 mg tablet; Take 2 tablets today, then take 1 tablet daily  Dispense: 6 Tablet; Refill: 0  - predniSONE (DELTASONE) 10 mg tablet; Take 40 mg by mouth daily (with breakfast) for 3 days, THEN 20 mg daily (with breakfast) for 3 days, THEN 10 mg daily (with breakfast) for 2 days. Dispense: 20 Tablet; Refill: 0    I affirm this is a Patient Initiated Episode with an Established Patient who has not had a related appointment within my department in the past 7 days or scheduled within the next 24 hours. Note: not billable if this call serves to triage the patient into an appointment for the relevant concern     We discussed the expected course, resolution and complications of the diagnosis(es) in detail. Medication risks, benefits, costs, interactions, and alternatives were discussed as indicated. I advised her to contact the office if her condition worsens, changes or fails to improve as anticipated. She expressed understanding with the diagnosis(es) and plan. Patient understands that this encounter was a temporary measure, and the importance of further follow up and examination was emphasized. Patient verbalized understanding. Electronically Signed: Breezy Neves DO    Providers location when delivering service: clinic    CPT:  24778 (5-10 minutes)  ([phone] (11-20 minutes)  21  (21-30 minutes)      ICD-10-CM ICD-9-CM    1. COPD exacerbation (HCC)  J44.1 491.21 azithromycin (ZITHROMAX) 250 mg tablet      predniSONE (DELTASONE) 10 mg tablet      2. End stage COPD ([facility]  J44.9 496           Pursuant to the emergency declaration under the [facility] waiver authority and the [employer] Act, this Virtual  Visit was conducted, with patient's consent, to reduce the patient's risk of exposure to COVID-19 and provide continuity of care for an established patient. Services were provided through a video synchronous discussion virtually to substitute for in-person clinic visit. History   Patients past medical, surgical and family histories were personally reviewed and updated.       Past Medical History:   Diagnosis Date    Acute respiratory failure with hypoxia ([facility] 2020    Adenoma of right lung     CAD (coronary artery disease)     stents  and     Depression     Esophageal ulcer 2020    GERD (gastroesophageal reflux disease)     Hypercholesterolemia     elevated particle number    Hypertension     MI (myocardial infarction) ([facility] ,     Non-small cell cancer of right lung [facility] 2015    Osteoarthritis     Over weight 2017    PAD (peripheral artery disease) ([facility]     TIA (transient ischemic attack)     Tobacco abuse 2014     Past Surgical History:   Procedure Laterality Date    HAND/FINGER SURGERY UNLISTED      left hand     HX CHOLECYSTECTOMY      HX COLONOSCOPY  2015    HX ENDOSCOPY  2020    HX HEART CATHETERIZATION  ,     HX HIP REPLACEMENT Bilateral     HX PATRICIA AND BSO          HX VASCULAR ANGIOPLASTY Bilateral 2017    right SFA, left politeal artery     Family History   Problem Relation Age of Onset    Cancer Mother         breast,ovarian colon     Hypertension Father     Stroke Father     Cancer Maternal Grandmother         colon    Cancer Maternal Grandfather      Social History     Socioeconomic History    Marital status: SINGLE     Spouse name: Not on file    Number of children: Not on file    Years of education: Not on file    Highest education level: Not on file   Occupational History    Occupation: hairdresser   Tobacco Use    Smoking status: Every Day     Packs/day: 1.00     Years: 40.00     Pack years: 40.00     Types: Cigarettes     Last attempt to quit: 2020     Years since quittin.8    Smokeless tobacco: Never   Vaping Use    Vaping Use: Never used   Substance and Sexual Activity    Alcohol use: No    Drug use: No    Sexual activity: Not Currently     Partners: Male   Other Topics Concern     Service No    Blood Transfusions Yes    Caffeine Concern No    Occupational Exposure No    Hobby Hazards No    Sleep Concern Yes     Comment: sleep apnea    Stress Concern Yes    Weight Concern No    Special Diet No    Back Care Yes     Comment: arthritis in the back    Exercise Yes     Comment: walk    Bike Helmet No    Seat Belt Yes    Self-Exams Yes   Social History Narrative    Not on file     Social Determinants of Health     Financial Resource Strain: Not on file   Food Insecurity: Not on file   Transportation Needs: Not on file   Physical Activity: Not on file   Stress: Not on file   Social Connections: Not on file   Intimate Partner Violence: Not on file   Housing Stability: Not on file            Current Medications/Allergies   Medications and Allergies reviewed:    Current Outpatient Medications   Medication Sig Dispense Refill    azithromycin (ZITHROMAX) 250 mg tablet Take 2 tablets today, then take 1 tablet daily 6 Tablet 0    predniSONE (DELTASONE) 10 mg tablet Take 40 mg by mouth daily (with breakfast) for 3 days, THEN 20 mg daily (with breakfast) for 3 days, THEN 10 mg daily (with breakfast) for 2 days. 20 Tablet 0    azithromycin (ZITHROMAX) 250 mg tablet Take 250 mg by mouth See Admin Instructions. Take 2 tablets by mouth on day 1  then take 1 tabley every day for 4 days. predniSONE (DELTASONE) 20 mg tablet Take 40 mg by mouth daily. budesonide (PULMICORT) 0.5 mg/2 mL nbsp Take 500 mcg by inhalation two (2) times a day. Indications: worsening of debilitating chronic lung disease called COPD      benzonatate (TESSALON) 100 mg capsule Take 100 mg by mouth three (3) times daily as needed for Cough. Indications: cough      OXYGEN-AIR DELIVERY SYSTEMS 3 L/min by Nasal route continuous.  Indications: SHORTNESS OF BREATH      simvastatin (ZOCOR) 40 mg tablet TAKE 1 TABLET BY MOUTH EVERY NIGHT 90 Tablet 0    pantoprazole (PROTONIX) 40 mg tablet TAKE 1 TABLET BY MOUTH DAILY 90 Tablet 0    clopidogreL (PLAVIX) 75 mg tab TAKE 1 TABLET BY MOUTH DAILY 90 Tablet 1    famotidine (PEPCID) 40 mg tablet TAKE 1 TABLET BY MOUTH EVERY DAY 90 Tablet 1    lisinopriL (PRINIVIL, ZESTRIL) 2.5 mg tablet TAKE 1 TABLET BY MOUTH EVERY DAY (Patient not taking: No sig reported) 90 Tablet 1    metoclopramide HCl (REGLAN) 5 mg tablet TAKE 1 TABLET BY MOUTH THREE TIMES DAILY BEFORE MEALS 270 Tablet 1    escitalopram oxalate (LEXAPRO) 20 mg tablet TAKE 1 TABLET BY MOUTH EVERY DAY AS NEEDED FOR ANXIETY (Patient taking differently: TAKE 1 TABLET BY MOUTH EVERY DAY FOR ANXIETY) 90 Tablet 1    rOPINIRole (REQUIP) 0.5 mg tablet TAKE 1 TABLET BY MOUTH THREE TIMES DAILY 270 Tablet 1    albuterol-ipratropium (DUO-NEB) 2.5 mg-0.5 mg/3 ml nebu USE 3 ML VIA NEBULIZER EVERY 6 HOURS AS NEEDED FOR WHEEZING 90 mL 5    doxycycline (MONODOX) 100 mg capsule Take 100 mg by mouth two (2) times a day. Indications: upper respiratory infection caused by Pneumococcus      levalbuterol (XOPENEX) 1.25 mg/3 mL nebu 3 mL by Nebulization route every four (4) hours as needed for Wheezing or Shortness of Breath. 30 Each 5    ALPRAZolam (XANAX) 0.5 mg tablet TAKE 1 TABLET BY MOUTH TWICE DAILY AS NEEDED FOR ANXIETY. MAX DAILY AMOUNT: 1 MG 60 Tablet 2    gabapentin (NEURONTIN) 300 mg capsule One in the AM and two at night  Indications: neuropathic pain (Patient taking differently: Take 300 mg by mouth two (2) times a day. Indications: neuropathic pain) 270 Capsule 2    bumetanide (BUMEX) 0.5 mg tablet TAKE 1 TABLET BY MOUTH DAILY (Patient taking differently: Take 0.5 mg by mouth daily as needed. Indications: visible water retention) 90 Tablet 0    albuterol (PROVENTIL HFA, VENTOLIN HFA, PROAIR HFA) 90 mcg/actuation inhaler Take 2 Puffs by inhalation every four (4) hours as needed for Wheezing.  8.5 g 1    budesonide-formoteroL (SYMBICORT) 160-4.5 mcg/actuation HFAA Take 2 Puffs by inhalation two (2) times a day. (Patient not taking: Reported on 8/22/2022) 10.2 g 0    Spiriva with HandiHaler 18 mcg inhalation capsule INHALE THE CONTENTS OF 1 CAPSULE VIA INHALATION DEVICE DAILY (Patient not taking: Reported on 8/22/2022) 30 Cap 5    aspirin delayed-release 81 mg tablet Take 81 mg by mouth daily. nitroglycerin (NITROSTAT) 0.4 mg SL tablet 0.4 mg by SubLINGual route every five (5) minutes as needed for Chest Pain.        Allergies   Allergen Reactions    Lemon Oil Nausea and Vomiting    Amoxil [Amoxicillin] Diarrhea    Anoro Ellipta [Umeclidinium-Vilanterol] Rash    Codeine Nausea and Vomiting    Fluticasone-Umeclidin-Vilanter Rash    Metoprolol Nausea and Vomiting

## 2022-11-15 ENCOUNTER — TELEPHONE (OUTPATIENT)
Dept: PHARMACY | Age: 52
End: 2022-11-15

## 2022-11-15 VITALS
RESPIRATION RATE: 20 BRPM | DIASTOLIC BLOOD PRESSURE: 60 MMHG | WEIGHT: 127 LBS | TEMPERATURE: 96.9 F | HEART RATE: 85 BPM | SYSTOLIC BLOOD PRESSURE: 92 MMHG | BODY MASS INDEX: 21.8 KG/M2 | OXYGEN SATURATION: 95 %

## 2022-11-15 NOTE — TELEPHONE ENCOUNTER
[facility] CLINICAL PHARMACY: ADHERENCE REVIEW  Identified care gap per [employer]: fills at [facility]: ACE/ARB adherence    Last Visit: 11/15/2022 VV with Jonna Cloud    Patient identified as LIS = 1, therefore patient may be able to receive a 90-day supply for the same cost as a 30-day supply      ASSESSMENT  ACE/ARB ADHERENCE    Insurance Records claims through 11/15/2022 (2021 [place] = na%; DEE [place] =  78%; Potential Fail Date: 11/19/2022 ):   Lisinopril last filled on 6/25/2022 for 90 day supply. Next refill due: 9/23/2022-past due 50 days      Was this medication dc from another Doctor? A new rx was approved 9/9/2022- was a refill request sent by pharmacy and approved by NP     lisinopriL (PRINIVIL, ZESTRIL) 2.5 mg tablet TAKE 1 TABLET BY MOUTH EVERY DAY (Patient not taking: Reported on 8/22/2022)     BP Readings from Last 3 Encounters:   11/02/22 120/80   10/31/22 104/82   10/28/22 102/67     CrCl cannot be calculated (Patient's most recent lab result is older than the maximum 180 days allowed. ). [address] Records claims through 11/15/2022 (2021 [place] = na%; DEE [place] =  100%; Passed in 2022):   simvastatin last filled on 9/13/2022 for 90 day supply.  Next refill due: 12/12/2022      Lab Results   Component Value Date/Time    Cholesterol, total 165 02/26/2020 08:40 AM    HDL Cholesterol 60 02/26/2020 08:40 AM    LDL, calculated 79 02/26/2020 08:40 AM    VLDL, calculated 26 02/26/2020 08:40 AM    Triglyceride 132 02/26/2020 08:40 AM     ALT (SGPT)   Date Value Ref Range Status   02/26/2020 15 0 - 32 IU/L Final     AST (SGOT)   Date Value Ref Range Status   02/26/2020 20 0 - 40 IU/L Final     The 10-year ASCVD risk score (Thuy BOYER, et al., 2019) is: 4%    Values used to calculate the score:      Age: 45 years      Sex: Female      Is Non- : No      Diabetic: No      Tobacco smoker: Yes      Systolic Blood Pressure: 508 mmHg      Is BP treated: Yes      HDL Cholesterol: 60 mg/dL      Total Cholesterol: 165 mg/dL     PLAN  The following are interventions that have been identified:  - Patient overdue refilling Lisinopril. Unsure if patient is still prescribed this medication.   -May have been dc from another Doctor- has pt   Reached patient to review. Patient said she has not taken this medication is over a year- it keeps getting refilled by mistake. She said she has also told Dr. Kenya Marino this as well- It is marked \"not taking\" at her OV with Dr. Kenya Marino  I have taken it off of her medication list.    She will call back later to go over her entire medication list so we can update with medications she is currently taking, and take medications she is not one off.   Future Appointments   Date Time Provider Department Center   11/21/2022 To Be Determined [address]   11/22/2022 11:30 AM Hyder, Shelba Heimlich, MD [facility] AMB   11/28/2022 To Be Determined Derick Chow RN [address] [place] [address]   2/27/2023  9:00 AM Hyder, Shelba Heimlich, MD [facility] AMB       Latha Mock, PharmD, [address] [facility]   Department, toll free: [phone] Option 2  For Pharmacy [address] [place] [address] in place: No  Recommendation Provided To: Patient/Caregiver: 1 via Telephone  Intervention Detail: Discontinued Rx: 1, reason: Therapy Complete  Gap Closed?: Yes  Intervention Accepted By: Patient/Caregiver: 1  Time Spent (min): 15

## 2022-11-16 VITALS
RESPIRATION RATE: 22 BRPM | OXYGEN SATURATION: 93 % | DIASTOLIC BLOOD PRESSURE: 82 MMHG | TEMPERATURE: 97.4 F | HEART RATE: 84 BPM | SYSTOLIC BLOOD PRESSURE: 126 MMHG

## 2022-11-17 DIAGNOSIS — J44.1 COPD EXACERBATION (HCC): Primary | ICD-10-CM

## 2022-11-17 RX ORDER — AMOXICILLIN AND CLAVULANATE POTASSIUM 875; 125 MG/1; MG/1
1 TABLET, FILM COATED ORAL EVERY 12 HOURS
Qty: 20 TABLET | Refills: 0 | Status: SHIPPED | OUTPATIENT
Start: 2022-11-17 | End: 2022-11-27

## 2022-11-20 ENCOUNTER — HOME CARE VISIT (OUTPATIENT)
Dept: SCHEDULING | Facility: HOME HEALTH | Age: 52
End: 2022-11-20
Payer: MEDICARE

## 2022-11-20 VITALS
OXYGEN SATURATION: 96 % | DIASTOLIC BLOOD PRESSURE: 68 MMHG | TEMPERATURE: 97.7 F | HEART RATE: 68 BPM | SYSTOLIC BLOOD PRESSURE: 112 MMHG | RESPIRATION RATE: 20 BRPM

## 2022-11-20 PROCEDURE — G0300 HHS/HOSPICE OF LPN EA 15 MIN: HCPCS

## 2022-11-22 ENCOUNTER — VIRTUAL VISIT (OUTPATIENT)
Dept: FAMILY MEDICINE CLINIC | Age: 52
End: 2022-11-22
Payer: MEDICARE

## 2022-11-22 DIAGNOSIS — C34.11 MALIGNANT NEOPLASM OF UPPER LOBE OF RIGHT LUNG (HCC): ICD-10-CM

## 2022-11-22 DIAGNOSIS — J44.9 END STAGE COPD (HCC): Primary | ICD-10-CM

## 2022-11-22 DIAGNOSIS — F41.9 ANXIETY: ICD-10-CM

## 2022-11-22 DIAGNOSIS — R63.4 WEIGHT LOSS: ICD-10-CM

## 2022-11-22 DIAGNOSIS — J44.9 CHRONIC OBSTRUCTIVE PULMONARY DISEASE, UNSPECIFIED COPD TYPE (HCC): ICD-10-CM

## 2022-11-22 DIAGNOSIS — R41.3 MEMORY CHANGES: ICD-10-CM

## 2022-11-22 DIAGNOSIS — R06.02 SOB (SHORTNESS OF BREATH): ICD-10-CM

## 2022-11-22 PROCEDURE — G8427 DOCREV CUR MEDS BY ELIG CLIN: HCPCS | Performed by: FAMILY MEDICINE

## 2022-11-22 PROCEDURE — G9717 DOC PT DX DEP/BP F/U NT REQ: HCPCS | Performed by: FAMILY MEDICINE

## 2022-11-22 PROCEDURE — G9899 SCRN MAM PERF RSLTS DOC: HCPCS | Performed by: FAMILY MEDICINE

## 2022-11-22 PROCEDURE — G8756 NO BP MEASURE DOC: HCPCS | Performed by: FAMILY MEDICINE

## 2022-11-22 PROCEDURE — 99214 OFFICE O/P EST MOD 30 MIN: CPT | Performed by: FAMILY MEDICINE

## 2022-11-22 PROCEDURE — 3017F COLORECTAL CA SCREEN DOC REV: CPT | Performed by: FAMILY MEDICINE

## 2022-11-22 RX ORDER — ALBUTEROL SULFATE 90 UG/1
2 AEROSOL, METERED RESPIRATORY (INHALATION) 4 TIMES DAILY
Qty: 8.5 G | Refills: 5 | Status: SHIPPED | OUTPATIENT
Start: 2022-11-22

## 2022-11-22 NOTE — PROGRESS NOTES
Silvio Kemp is a 45 y.o. female who was seen by synchronous (real-time) audio-video technology on 11/22/2022. Consent:  Services were provided through a video synchronous discussion virtually to substitute for in-person appointment. She and/or her healthcare decision maker is aware that this patient-initiated Telehealth encounter is a billable service, with coverage as determined by her insurance carrier. She is aware that she may receive a bill and has provided verbal consent to proceed: Yes    I was in the office while conducting this encounter. Subjective:   Edelmira Sewell was seen for Nasal Congestion    Nasal congestion is improving with antibiotics and prednisone. She tested negative for COVID twice. End stage COPD: She is under the care of pulmonologist, Dr. Mone Pitts. She has been doing well with home PT and OT. She spoke with someone about palliative care who said that her symptoms were not at the point to need palliative care. She is on 3-4L of oxygen. She is requesting refill of albuterol. Malignant neoplasm of upper lobe of right lung: Recent PET scan showed new masses in right and left lung. She is under the care of oncologist, Dr. Micaela Acuña. She goes on 12/1/22 to get set up for radiation treatment. Memory changes: She reports some trouble with her memory. Weight loss: Weight has been stable. She is working to prevent further weight loss. Health Maintenance:   She is not interested in flu vaccine. She is working on scheduling mammogram at [facility]. She deferred cologuard today. Prior to Admission medications    Medication Sig Start Date End Date Taking? Authorizing Provider   albuterol (PROVENTIL HFA, VENTOLIN HFA, PROAIR HFA) 90 mcg/actuation inhaler Take 2 Puffs by inhalation four (4) times daily.  11/22/22  Yes Tu Girard MD   amoxicillin-clavulanate (AUGMENTIN) 875-125 mg per tablet Take 1 Tablet by mouth every twelve (12) hours for 10 days. 11/17/22 11/27/22 Yes Benjie, Zohra Slight, DO   predniSONE (DELTASONE) 10 mg tablet Take 40 mg by mouth daily (with breakfast) for 3 days, THEN 20 mg daily (with breakfast) for 3 days, THEN 10 mg daily (with breakfast) for 2 days. 11/14/22 11/22/22 Yes Benjie, Zohra Slight, DO   guaiFENesin ER (MUCINEX) 600 mg ER tablet Take 1 Tablet by mouth two (2) times a day for 7 days. 11/14/22 11/22/22 Yes Benjie, Zohra Slight, DO   azithromycin (ZITHROMAX) 250 mg tablet Take 250 mg by mouth See Admin Instructions. Take 2 tablets by mouth on day 1  then take 1 tabley every day for 4 days. Yes Provider, Historical   predniSONE (DELTASONE) 20 mg tablet Take 10 mg by mouth daily. tapering dose  40mg x3 days  30mg x3 days  20mg x3days  10mg x2days   11/14/22  Yes Provider, Historical   budesonide (PULMICORT) 0.5 mg/2 mL nbsp Take 500 mcg by inhalation two (2) times a day. Indications: worsening of debilitating chronic lung disease called COPD   Yes Provider, Historical   benzonatate (TESSALON) 100 mg capsule Take 100 mg by mouth three (3) times daily as needed for Cough. Indications: cough   Yes Provider, Historical   OXYGEN-AIR DELIVERY SYSTEMS 3 L/min by Nasal route continuous.  Indications: SHORTNESS OF BREATH   Yes Provider, Historical   simvastatin (ZOCOR) 40 mg tablet TAKE 1 TABLET BY MOUTH EVERY NIGHT 9/12/22  Yes Hemant Carballo MD   pantoprazole (PROTONIX) 40 mg tablet TAKE 1 TABLET BY MOUTH DAILY 9/12/22  Yes Feroz Baker MD   clopidogreL (PLAVIX) 75 mg tab TAKE 1 TABLET BY MOUTH DAILY 9/9/22  Yes Jayne Priest NP   famotidine (PEPCID) 40 mg tablet TAKE 1 TABLET BY MOUTH EVERY DAY 9/9/22  Yes Jayne Priest NP   metoclopramide HCl (REGLAN) 5 mg tablet TAKE 1 TABLET BY MOUTH THREE TIMES DAILY BEFORE MEALS 9/9/22  Yes Jayne Priest NP   escitalopram oxalate (LEXAPRO) 20 mg tablet TAKE 1 TABLET BY MOUTH EVERY DAY AS NEEDED FOR ANXIETY  Patient taking differently: TAKE 1 TABLET BY MOUTH EVERY DAY FOR ANXIETY 9/9/22  Yes Akash Priest NP   rOPINIRole (REQUIP) 0.5 mg tablet TAKE 1 TABLET BY MOUTH THREE TIMES DAILY 9/9/22  Yes Akash Priest NP   albuterol-ipratropium (DUO-NEB) 2.5 mg-0.5 mg/3 ml nebu USE 3 ML VIA NEBULIZER EVERY 6 HOURS AS NEEDED FOR WHEEZING 8/30/22  Yes Hemant Carballo MD   doxycycline (MONODOX) 100 mg capsule Take 100 mg by mouth two (2) times a day. Indications: upper respiratory infection caused by Pneumococcus   Yes Provider, Historical   levalbuterol (XOPENEX) 1.25 mg/3 mL nebu 3 mL by Nebulization route every four (4) hours as needed for Wheezing or Shortness of Breath. 8/22/22  Yes Linda Lamb MD   ALPRAZolam (XANAX) 0.5 mg tablet TAKE 1 TABLET BY MOUTH TWICE DAILY AS NEEDED FOR ANXIETY. MAX DAILY AMOUNT: 1 MG 9/2/22  Yes Linda Lamb MD   gabapentin (NEURONTIN) 300 mg capsule One in the AM and two at night  Indications: neuropathic pain  Patient taking differently: Take 300 mg by mouth two (2) times a day. Indications: neuropathic pain 8/22/22  Yes Linda Lamb MD   bumetanide (BUMEX) 0.5 mg tablet TAKE 1 TABLET BY MOUTH DAILY  Patient taking differently: Take 0.5 mg by mouth daily as needed. Indications: visible water retention 7/13/22  Yes Akash Priest NP   budesonide-formoteroL (SYMBICORT) 160-4.5 mcg/actuation HFAA Take 2 Puffs by inhalation two (2) times a day. 5/24/22  Yes Linda Lamb MD   Spiriva with HandiHaler 18 mcg inhalation capsule INHALE THE CONTENTS OF 1 CAPSULE VIA INHALATION DEVICE DAILY 5/5/21  Yes Linda Lamb MD   aspirin delayed-release 81 mg tablet Take 81 mg by mouth daily. 10/2/20  Yes Provider, Historical   nitroglycerin (NITROSTAT) 0.4 mg SL tablet 0.4 mg by SubLINGual route every five (5) minutes as needed for Chest Pain.    Yes Provider, Historical   albuterol (PROVENTIL HFA, VENTOLIN HFA, PROAIR HFA) 90 mcg/actuation inhaler Take 2 Puffs by inhalation every four (4) hours as needed for Wheezing. 6/28/22 11/22/22  Shayy Urbina MD     Allergies Allergen Reactions    Lemon Oil Nausea and Vomiting    Amoxil [Amoxicillin] Diarrhea    Anoro Ellipta [Umeclidinium-Vilanterol] Rash    Codeine Nausea and Vomiting    Fluticasone-Umeclidin-Vilanter Rash    Metoprolol Nausea and Vomiting     Past Medical History:   Diagnosis Date    Acute respiratory failure with hypoxia ([facility] 2020    Adenoma of right lung     CAD (coronary artery disease)     stents  and     Depression     Esophageal ulcer 2020    GERD (gastroesophageal reflux disease)     Hypercholesterolemia     elevated particle number    Hypertension     MI (myocardial infarction) ([facility] ,     Non-small cell cancer of right lung ([facility] 2015    Osteoarthritis     Over weight 2017    PAD [facility]peripheral [facility]     TIA (transient ischemic attack)     Tobacco abuse 2014     Past Surgical History:   Procedure Laterality Date    HAND/FINGER SURGERY UNLISTED      left hand     HX CHOLECYSTECTOMY      HX COLONOSCOPY  2015    HX ENDOSCOPY  ,     HX HEART CATHETERIZATION  ,     HX HIP REPLACEMENT Bilateral     HX PATRICIA AND BSO          HX VASCULAR ANGIOPLASTY Bilateral 2017    right SFA, left politeal artery     Family History   Problem Relation Age of Onset    Cancer Mother         breast,ovarian colon     Hypertension Father     Stroke Father     Cancer Maternal Grandmother         colon    Cancer Maternal Grandfather      Social History     Tobacco Use    Smoking status: Every Day     Packs/day: 1.00     Years: 40.00     Pack years: 40.00     Types: Cigarettes     Last attempt to quit: 2020     Years since quittin.8    Smokeless tobacco: Never   Substance Use Topics    Alcohol use: No        Review of Systems   Constitutional:  Negative for chills and fever. HENT:  Negative for hearing loss and tinnitus. Eyes:  Negative for blurred vision and double vision. Respiratory:  Positive for shortness of breath.  Negative for cough. Cardiovascular:  Negative for chest pain and palpitations. Gastrointestinal:  Negative for nausea and vomiting. Genitourinary:  Negative for dysuria and frequency. Musculoskeletal:  Negative for back pain and falls. Skin:  Negative for itching and rash. Neurological:  Negative for dizziness, loss of consciousness and headaches. Endo/Heme/Allergies: Negative. Psychiatric/Behavioral:  Negative for depression. The patient is not nervous/anxious. PHYSICAL EXAMINATION:  Vital Signs: (As obtained by patient/caregiver at home)  There were no vitals taken for this visit. Constitutional: [x] Appears well-developed and well-nourished [x] No apparent distress      [] Abnormal -     Mental status: [x] Alert and awake  [x] Oriented to person/place/time [x] Able to follow commands    [] Abnormal -     Eyes:   EOM    [x]  Normal    [] Abnormal -   Sclera  [x]  Normal    [] Abnormal -          Discharge [x]  None visible   [] Abnormal -     HENT: [x] Normocephalic, atraumatic  [] Abnormal -   [x] Mouth/Throat: Mucous membranes are moist    External Ears [x] Normal  [] Abnormal -    Neck: [x] No visualized mass [] Abnormal -     Pulmonary/Chest: [x] Respiratory effort normal   [x] No visualized signs of difficulty breathing or respiratory distress        [] Abnormal -      Musculoskeletal:   [x] Normal gait with no signs of ataxia         [x] Normal range of motion of neck        [] Abnormal -     Neurological:        [x] No Facial Asymmetry (Cranial nerve 7 motor function) (limited exam due to video visit)          [x] No gaze palsy        [] Abnormal -          Skin:        [x] No significant exanthematous lesions or discoloration noted on facial skin         [] Abnormal -            Psychiatric:       [x] Normal Affect [] Abnormal -        [x] No Hallucinations    Other pertinent observable physical exam findings:-    Assessment & Plan:   Diagnoses and all orders for this visit:    1.  End stage COPD ([address]  Continue current regimen. She is under the care of pulmonologist, Dr. Jesus Aragon. I refilled her albuterol.   -     albuterol (PROVENTIL HFA, VENTOLIN HFA, PROAIR HFA) 90 mcg/actuation inhaler; Take 2 Puffs by inhalation four (4) times daily. 2. SOB (shortness of breath)  See above. 3. Malignant neoplasm of upper lobe of right lung [facility])  She is under the care of oncologist, Dr. Ta. She goes in 12/1/22 to get set up for radiation treatment. 4. Memory changes  Her memory changes are likely hypoxic in nature. 5. Weight loss  I encouraged pt to continue to eat regularly to avoid further weight loss. Follow-up and Dispositions    Return in about 3 months (around 2/22/2023) for chronic follow up. We discussed the expected course, resolution and complications of the diagnosis(es) in detail. Medication risks, benefits, costs, interactions, and alternatives were discussed as indicated. I advised her to contact the office if her condition worsens, changes or fails to improve as anticipated. She expressed understanding with the diagnosis(es) and plan. Pursuant to the emergency declaration under the [address] and [facility] waiver authority and the [employer] and [employer] General Act, this Virtual Visit was conducted, with patient's consent, to reduce the patient's risk of exposure to COVID-19 and provide continuity of care for an established patient. Services were provided through a video synchronous discussion virtually to substitute for in-person clinic visit. Written by tracey Landis, as dictated by Kip Parker M.D.    10:50 AM - 11:05 AM    Total time spent with the patient 15 minutes, greater than 50% of time spent counseling patient.

## 2022-11-23 RX ORDER — ALPRAZOLAM 0.5 MG/1
TABLET ORAL
Qty: 60 TABLET | Refills: 2 | Status: SHIPPED | OUTPATIENT
Start: 2022-11-23

## 2022-11-23 RX ORDER — IPRATROPIUM BROMIDE AND ALBUTEROL SULFATE 2.5; .5 MG/3ML; MG/3ML
SOLUTION RESPIRATORY (INHALATION)
Qty: 90 ML | Refills: 5 | Status: SHIPPED | OUTPATIENT
Start: 2022-11-23

## 2022-11-25 ENCOUNTER — HOME CARE VISIT (OUTPATIENT)
Dept: HOME HEALTH SERVICES | Facility: HOME HEALTH | Age: 52
End: 2022-11-25
Payer: MEDICARE

## 2022-11-28 ENCOUNTER — HOME CARE VISIT (OUTPATIENT)
Dept: SCHEDULING | Facility: HOME HEALTH | Age: 52
End: 2022-11-28
Payer: MEDICARE

## 2022-11-28 VITALS
RESPIRATION RATE: 19 BRPM | SYSTOLIC BLOOD PRESSURE: 108 MMHG | OXYGEN SATURATION: 95 % | TEMPERATURE: 98.1 F | DIASTOLIC BLOOD PRESSURE: 82 MMHG | HEART RATE: 102 BPM

## 2022-11-28 PROCEDURE — G0299 HHS/HOSPICE OF RN EA 15 MIN: HCPCS

## 2022-12-05 RX ORDER — PANTOPRAZOLE SODIUM 40 MG/1
TABLET, DELAYED RELEASE ORAL
Qty: 90 TABLET | Refills: 0 | Status: SHIPPED | OUTPATIENT
Start: 2022-12-05

## 2022-12-06 DIAGNOSIS — E78.00 HYPERCHOLESTEROLEMIA: ICD-10-CM

## 2022-12-07 RX ORDER — SIMVASTATIN 40 MG/1
TABLET, FILM COATED ORAL
Qty: 90 TABLET | Refills: 0 | Status: SHIPPED | OUTPATIENT
Start: 2022-12-07

## 2022-12-12 ENCOUNTER — PATIENT OUTREACH (OUTPATIENT)
Dept: CASE MANAGEMENT | Age: 52
End: 2022-12-12

## 2022-12-12 NOTE — PROGRESS NOTES
Goals Addressed                   This Visit's Progress     Patient verbalizes understanding of self-management goals of living with COPD. On track     10/19/22  Pt seen at [facility] ER 10/14/22 dx: COPD exacerbation. O2 sats on arrival in 60's on room air. Pt prescribed prednisone and zithromax. Pt states she is currently on 4 LPM continuous. Reports previously on 3 LPM. \"Feeling a bit better\" requesting refill on both meds. ACM printed discharge summary for PCP review. Pt was advised to follow up with pulmonary and PCP. ACM contacted [facility] requesting last o/v note. Pt advised to schedule ED follow up/med refill request with PAR. Pt will call and schedule ED follow up with PAR. ACM outreach 7-14 days. PM  10/27/22  LVM requesting return call. ACM to update goals. PM   11/10/22  Pt states she feels bronchitis is worsening. Reports SOB w/wo exertion. Productive cough. 4 lpm continuous via NC. Pt is requesting refill on prednisone and zithromax. Pt scheduled for VV 11/14/22. Previously prescribed during ER encounter 10/14/22. Pt has not yet heard back from [address] regarding scheduling follow up appointment. ACM advised pt to call PAR again. 21-30 days. PM   12/12/22  Attended 11/14/22 VV as scheduled. Pt seen by PCP 11/22/22. Pt to follow up in 3mos. Followed by Dr Macy Stephenson. CT revealed new masses to right and left lungs. Scheduled 12/1/22 to set up radiation. ACM to inquire rad/chemo at next outreach. 21-30 days.  PM

## 2022-12-14 LAB — HBA1C MFR BLD HPLC: 5.6 %

## 2023-01-11 ENCOUNTER — HOME HEALTH ADMISSION (OUTPATIENT)
Dept: HOME HEALTH SERVICES | Facility: HOME HEALTH | Age: 53
End: 2023-01-11

## 2023-01-13 ENCOUNTER — TELEPHONE (OUTPATIENT)
Dept: FAMILY MEDICINE CLINIC | Age: 53
End: 2023-01-13

## 2023-01-13 NOTE — TELEPHONE ENCOUNTER
Pt called said this time she is going through [facility] for PT,OT, and Home Health. She will call on Monday to schedule hos follow up with .

## 2023-01-13 NOTE — TELEPHONE ENCOUNTER
Patient called the same time as [facility], and wanted to confirm that she would like to go with them instead of us. Patient hung up before I could collect anymore information.

## 2023-01-13 NOTE — TELEPHONE ENCOUNTER
Noted, Called [facility] home health advised yes Dr. Bull Records will follow Patient for [address]

## 2023-01-13 NOTE — TELEPHONE ENCOUNTER
[facility] Liaison, w/ [facility], called and stated \"We are wanting to know if Dr. Ely Calle will follow the pt. \" Clinic Liaison advised that [facility] reached out to [facility] on 01/11/23. Liaison stated \"I will reach back out to the pt. \"     Callback Phone Number: [phone]    -[facility] PSR [facility]    3:47 pm 01/13/23: Iva Mora reached back out to [facility] stating \"The pt is requesting to be followed by [facility] and not [facility]. \" Pt reached out and spoke to [facility] at the exact same time as [facility] call.

## 2023-01-16 ENCOUNTER — TELEPHONE (OUTPATIENT)
Dept: FAMILY MEDICINE CLINIC | Age: 53
End: 2023-01-16

## 2023-01-16 NOTE — TELEPHONE ENCOUNTER
Bethany More from [address] called wanting to get a verbal order for physical therapy twice for three week  and the once week for three weeks. Working on walking strengthening and activity tolerance.     Best call back #[phone]

## 2023-01-23 ENCOUNTER — VIRTUAL VISIT (OUTPATIENT)
Dept: FAMILY MEDICINE CLINIC | Age: 53
End: 2023-01-23
Payer: MEDICARE

## 2023-01-23 DIAGNOSIS — F33.9 DEPRESSION, RECURRENT (HCC): ICD-10-CM

## 2023-01-23 DIAGNOSIS — F41.9 ANXIETY: ICD-10-CM

## 2023-01-23 DIAGNOSIS — I73.9 PAD (PERIPHERAL ARTERY DISEASE) (HCC): ICD-10-CM

## 2023-01-23 DIAGNOSIS — C34.11 MALIGNANT NEOPLASM OF UPPER LOBE OF RIGHT LUNG (HCC): ICD-10-CM

## 2023-01-23 DIAGNOSIS — J44.9 END STAGE COPD (HCC): Primary | ICD-10-CM

## 2023-01-23 DIAGNOSIS — I50.42 CHRONIC COMBINED SYSTOLIC AND DIASTOLIC CONGESTIVE HEART FAILURE (HCC): ICD-10-CM

## 2023-01-23 PROCEDURE — 99495 TRANSJ CARE MGMT MOD F2F 14D: CPT | Performed by: FAMILY MEDICINE

## 2023-01-23 PROCEDURE — G8427 DOCREV CUR MEDS BY ELIG CLIN: HCPCS | Performed by: FAMILY MEDICINE

## 2023-01-23 RX ORDER — ALBUTEROL SULFATE 90 UG/1
2 AEROSOL, METERED RESPIRATORY (INHALATION) 4 TIMES DAILY
Qty: 8.5 G | Refills: 5 | Status: SHIPPED | OUTPATIENT
Start: 2023-01-23

## 2023-01-23 RX ORDER — PREDNISONE 10 MG/1
10 TABLET ORAL DAILY
Qty: 30 TABLET | Refills: 0
Start: 2023-01-23

## 2023-01-23 NOTE — PROGRESS NOTES
Zachery Mcfarland is a 45 y.o. female who was seen by synchronous (real-time) audio-video technology on 1/23/2023. Consent:  Services were provided through a video synchronous discussion virtually to substitute for in-person appointment. She and/or her healthcare decision maker is aware that this patient-initiated Telehealth encounter is a billable service, with coverage as determined by her insurance carrier. She is aware that she may receive a bill and has provided verbal consent to proceed: Yes    I was in the office while conducting this encounter. Subjective:   Daisy Ruslan Sewell was seen for BOB. Ms. Hernan Wayne is a 45y.o. year old female, she is seen today for Transition of Care services following a hospital discharge for end stage COPD on ~1/13/23. Pt went to hospital ([facility]) on 1/6/23 to approximately 1/13/23. I will have my nurse request these records. Since her discharge, she has been doing better with her breathing. She has been going to PT and reports she is getting stronger. She is currently on prednisone 10mg once daily (per pulmonology). She is working on getting an ACP together. Depression: She note her depression has been worse lately. PHQ 9 Score today was 18/30. Pt is on Lexapro 20mg daily. She thinks the Lexapro has been helping, she is \"just going through a spell right now. \"     Anxiety: She takes xanax 0.5mg BID as needed. End stage COPD: She is under the care of pulmonologist, Dr. Annamaria Ovalle. Malignant neoplasm of upper lobe of right lung: She is under the care of oncologist, Dr. Nelson Jaramillo. Health Maintenance:   She would like to hold off on getting shingles vaccine. She would also like to hold off on colon cancer and breast cancer screenings. Prior to Admission medications    Medication Sig Start Date End Date Taking? Authorizing Provider   predniSONE (DELTASONE) 10 mg tablet Take 10 mg by mouth daily.  1/23/23  Yes Terence Manriquez MD   simvastatin (ZOCOR) 40 mg tablet TAKE 1 TABLET BY MOUTH EVERY NIGHT 12/7/22  Yes Terence Manriquez MD   pantoprazole (PROTONIX) 40 mg tablet TAKE 1 TABLET BY MOUTH DAILY 12/5/22  Yes Terence Manriquez MD   albuterol-ipratropium (DUO-NEB) 2.5 mg-0.5 mg/3 ml nebu USE 3 ML VIA NEBULIZER EVERY 6 HOURS AS NEEDED FOR WHEEZING 11/23/22  Yes Terence Manriquez MD   ALPRAZolam (XANAX) 0.5 mg tablet TAKE 1 TABLET BY MOUTH TWICE DAILY AS NEEDED FOR ANXIETY. MAX DAILY AMOUNT: 1 MG 11/23/22  Yes Terence Manriquez MD   albuterol (PROVENTIL HFA, VENTOLIN HFA, PROAIR HFA) 90 mcg/actuation inhaler Take 2 Puffs by inhalation four (4) times daily. 11/22/22  Yes Terence Manriquez MD   budesonide (PULMICORT) 0.5 mg/2 mL nbsp Take 500 mcg by inhalation two (2) times a day. Indications: worsening of debilitating chronic lung disease called COPD   Yes Provider, Historical   OXYGEN-AIR DELIVERY SYSTEMS 3 L/min by Nasal route continuous. Indications: SHORTNESS OF BREATH   Yes Provider, Historical   clopidogreL (PLAVIX) 75 mg tab TAKE 1 TABLET BY MOUTH DAILY 9/9/22  Yes Flores Priest NP   famotidine (PEPCID) 40 mg tablet TAKE 1 TABLET BY MOUTH EVERY DAY 9/9/22  Yes Flores Priest NP   metoclopramide HCl (REGLAN) 5 mg tablet TAKE 1 TABLET BY MOUTH THREE TIMES DAILY BEFORE MEALS 9/9/22  Yes Flores Priest NP   escitalopram oxalate (LEXAPRO) 20 mg tablet TAKE 1 TABLET BY MOUTH EVERY DAY AS NEEDED FOR ANXIETY  Patient taking differently: TAKE 1 TABLET BY MOUTH EVERY DAY FOR ANXIETY 9/9/22  Yes Flores Priest NP   rOPINIRole (REQUIP) 0.5 mg tablet TAKE 1 TABLET BY MOUTH THREE TIMES DAILY 9/9/22  Yes Flores Priest NP   gabapentin (NEURONTIN) 300 mg capsule One in the AM and two at night  Indications: neuropathic pain  Patient taking differently: Take 300 mg by mouth two (2) times a day.  Indications: neuropathic pain 8/22/22  Yes Terence Manriquez MD   bumetanide (BUMEX) 0.5 mg tablet TAKE 1 TABLET BY MOUTH DAILY  Patient taking differently: Take 0.5 mg by mouth daily as needed. Indications: visible water retention 7/13/22  Yes Janay Priest, NP   aspirin delayed-release 81 mg tablet Take 81 mg by mouth daily. 10/2/20  Yes Provider, Historical   nitroglycerin (NITROSTAT) 0.4 mg SL tablet 0.4 mg by SubLINGual route every five (5) minutes as needed for Chest Pain. Yes Provider, Historical   azithromycin (ZITHROMAX) 250 mg tablet Take 250 mg by mouth See Admin Instructions. Take 2 tablets by mouth on day 1  then take 1 tabley every day for 4 days. 1/23/23  Provider, Historical   predniSONE (DELTASONE) 20 mg tablet Take 10 mg by mouth daily. tapering dose  40mg x3 days  30mg x3 days  20mg x3days  10mg x2days   11/14/22 1/23/23  Provider, Historical   benzonatate (TESSALON) 100 mg capsule Take 100 mg by mouth three (3) times daily as needed for Cough. Indications: cough  1/23/23  Provider, Historical   doxycycline (MONODOX) 100 mg capsule Take 100 mg by mouth two (2) times a day. Indications: upper respiratory infection caused by Pneumococcus  1/23/23  Provider, Historical   levalbuterol (XOPENEX) 1.25 mg/3 mL nebu 3 mL by Nebulization route every four (4) hours as needed for Wheezing or Shortness of Breath. 8/22/22 1/23/23  Willi Carballo MD   budesonide-formoteroL (SYMBICORT) 160-4.5 mcg/actuation HFAA Take 2 Puffs by inhalation two (2) times a day.  5/24/22 1/23/23  Arpan Terrell MD   Spiriva with HandiHaler 18 mcg inhalation capsule INHALE THE CONTENTS OF 1 CAPSULE VIA INHALATION DEVICE DAILY 5/5/21 1/23/23  Arpan Terrell MD     Allergies   Allergen Reactions    Lemon Oil Nausea and Vomiting    Amoxil [Amoxicillin] Diarrhea    Anoro Ellipta [Umeclidinium-Vilanterol] Rash    Codeine Nausea and Vomiting    Fluticasone-Umeclidin-Vilanter Rash    Metoprolol Nausea and Vomiting     Past Medical History:   Diagnosis Date    Acute respiratory failure with hypoxia ([facility] 01/2020    Adenoma of right lung 2018    CAD (coronary artery disease)     stents 2005 and 2009    Depression     Esophageal ulcer 01/2020    GERD (gastroesophageal reflux disease)     Hypercholesterolemia     elevated particle number    Hypertension     MI (myocardial infarction) ([facility] [address] 2005, 2009    Non-small cell cancer of right lung ([facility] [address] 08/2015    Osteoarthritis     Over weight 8/21/2017    PAD (peripheral artery disease) ([facility] [address] 2017    TIA (transient ischemic attack)     Tobacco abuse 2/28/2014     Past Surgical History:   Procedure Laterality Date    HAND/FINGER SURGERY UNLISTED      left hand     HX CHOLECYSTECTOMY  2002    HX COLONOSCOPY  06/2015    HX ENDOSCOPY  2014, 2020    HX HEART CATHETERIZATION  2005, 2009    HX HIP REPLACEMENT Bilateral     HX PATRICIA AND BSO  2007        HX VASCULAR ANGIOPLASTY Bilateral 2017    right SFA, left politeal artery     Family History   Problem Relation Age of Onset    Cancer Mother         breast,ovarian colon     Hypertension Father     Stroke Father     Cancer Maternal Grandmother         colon    Cancer Maternal Grandfather      Social History     Tobacco Use    Smoking status: Every Day     Packs/day: 1.00     Years: 40.00     Pack years: 40.00     Types: Cigarettes     Last attempt to quit: 1/7/2020     Years since quitting: 3.0    Smokeless tobacco: Never   Substance Use Topics    Alcohol use: No        Review of Systems   Constitutional:  Negative for chills and fever. HENT:  Negative for hearing loss and tinnitus. Eyes:  Negative for blurred vision and double vision. Respiratory:  Negative for cough and shortness of breath. Cardiovascular:  Negative for chest pain and palpitations. Gastrointestinal:  Negative for nausea and vomiting. Genitourinary:  Negative for dysuria and frequency. Musculoskeletal:  Negative for back pain and falls. Skin:  Negative for itching and rash. Neurological:  Negative for dizziness, loss of consciousness and headaches. Endo/Heme/Allergies: Negative. Psychiatric/Behavioral:  Negative for depression. The patient is not nervous/anxious. PHYSICAL EXAMINATION:  Vital Signs: (As obtained by patient/caregiver at home)  There were no vitals taken for this visit. Constitutional: [x] Appears well-developed and well-nourished [x] No apparent distress      [] Abnormal -     Mental status: [x] Alert and awake  [x] Oriented to person/place/time [x] Able to follow commands    [] Abnormal -     Eyes:   EOM    [x]  Normal    [] Abnormal -   Sclera  [x]  Normal    [] Abnormal -          Discharge [x]  None visible   [] Abnormal -     HENT: [x] Normocephalic, atraumatic  [] Abnormal -   [x] Mouth/Throat: Mucous membranes are moist    External Ears [x] Normal  [] Abnormal -    Neck: [x] No visualized mass [] Abnormal -     Pulmonary/Chest: [x] Respiratory effort normal   [x] No visualized signs of difficulty breathing or respiratory distress        [] Abnormal -      Musculoskeletal:   [x] Normal gait with no signs of ataxia         [x] Normal range of motion of neck        [] Abnormal -     Neurological:        [x] No Facial Asymmetry (Cranial nerve 7 motor function) (limited exam due to video visit)          [x] No gaze palsy        [] Abnormal -          Skin:        [x] No significant exanthematous lesions or discoloration noted on facial skin         [] Abnormal -            Psychiatric:       [x] Normal Affect [] Abnormal -        [x] No Hallucinations    Other pertinent observable physical exam findings:-    Assessment & Plan:   Diagnoses and all orders for this visit:    I updated her medication list.     1. End stage COPD ([address]  Assessment & Plan:   monitored by specialist. No acute findings meriting change in the plan  Orders:  -     albuterol (PROVENTIL HFA, VENTOLIN HFA, PROAIR HFA) 90 mcg/actuation inhaler; Take 2 Puffs by inhalation four (4) times daily.     2. Chronic combined systolic and diastolic congestive heart failure [facility])  Assessment & Plan: monitored by specialist. No acute findings meriting change in the plan  Orders:  -     predniSONE (DELTASONE) 10 mg tablet; Take 10 mg by mouth daily. 3. Malignant neoplasm of upper lobe of right lung [facility])  Assessment & Plan:   monitored by specialist. No acute findings meriting change in the plan    4. Depression, recurrent ([facility]  Continue with Lexapro 20mg daily. Assessment & Plan:   uncontrolled, continue current plan pending work up below    5. PAD (peripheral artery disease) ([facility]  Assessment & Plan:   monitored by specialist. No acute findings meriting change in the plan    6. Anxiety  Continue with xanax prn. We discussed the expected course, resolution and complications of the diagnosis(es) in detail. Medication risks, benefits, costs, interactions, and alternatives were discussed as indicated. I advised her to contact the office if her condition worsens, changes or fails to improve as anticipated. She expressed understanding with the diagnosis(es) and plan. Pursuant to the emergency declaration under the [address] and [place] [address] waiver authority and the [employer] Act, this Virtual Visit was conducted, with patient's consent, to reduce the patient's risk of exposure to COVID-19 and provide continuity of care for an established patient. Services were provided through a video synchronous discussion virtually to substitute for in-person clinic visit. Written by tracey Purvis, as dictated by Edis Montes M.D.    5:10 PM - 5:27 PM    Total time spent with the patient 17 minutes, greater than 50% of time spent counseling patient.

## 2023-02-07 DIAGNOSIS — G25.81 RLS (RESTLESS LEGS SYNDROME): ICD-10-CM

## 2023-02-07 NOTE — TELEPHONE ENCOUNTER
Last Visit: VV 1/23/23 MD Carballo  Next Appointment: 2/20/23 MD Carballo  Previous Refill Encounter(s): 9/9/22 270 + 1 (last refill 11/16/22)    Requested Prescriptions     Pending Prescriptions Disp Refills    rOPINIRole (REQUIP) 0.5 mg tablet 270 Tablet 1     Sig: Take 1 Tablet by mouth three (3) times daily. For Pharmacy Admin Tracking Only    Program: Medication Refill  CPA in place:   Recommendation Provided To:    Intervention Detail: New Rx: 1, reason: Patient Preference  Intervention Accepted By:   Denis Méndez Closed?:   Time Spent (min): 5

## 2023-02-08 RX ORDER — ROPINIROLE 0.5 MG/1
0.5 TABLET, FILM COATED ORAL 3 TIMES DAILY
Qty: 270 TABLET | Refills: 1 | Status: SHIPPED | OUTPATIENT
Start: 2023-02-08

## 2023-02-20 ENCOUNTER — VIRTUAL VISIT (OUTPATIENT)
Dept: FAMILY MEDICINE CLINIC | Age: 53
End: 2023-02-20

## 2023-02-20 DIAGNOSIS — Z12.31 ENCOUNTER FOR SCREENING MAMMOGRAM FOR MALIGNANT NEOPLASM OF BREAST: Primary | ICD-10-CM

## 2023-02-20 NOTE — PROGRESS NOTES
Trent Sood is a 45 y.o. female who was seen by synchronous (real-time) audio-video technology on 2/20/2023. Consent:  Services were provided through a video synchronous discussion virtually to substitute for in-person appointment. She and/or her healthcare decision maker is aware that this patient-initiated Telehealth encounter is a billable service, with coverage as determined by her insurance carrier. She is aware that she may receive a bill and has provided verbal consent to proceed: Yes    I was in the office while conducting this encounter. Subjective:   Shanae Sewell was seen for No chief complaint on file. Prior to Admission medications    Medication Sig Start Date End Date Taking? Authorizing Provider   rOPINIRole (REQUIP) 0.5 mg tablet Take 1 Tablet by mouth three (3) times daily. 2/8/23   Cassi Carballo MD   predniSONE (DELTASONE) 10 mg tablet Take 10 mg by mouth daily. 1/23/23   Cassi Carballo MD   albuterol (PROVENTIL HFA, VENTOLIN HFA, PROAIR HFA) 90 mcg/actuation inhaler Take 2 Puffs by inhalation four (4) times daily. 1/23/23   Cassi Carballo MD   simvastatin (ZOCOR) 40 mg tablet TAKE 1 TABLET BY MOUTH EVERY NIGHT 12/7/22   Cassi Carballo MD   pantoprazole (PROTONIX) 40 mg tablet TAKE 1 TABLET BY MOUTH DAILY 12/5/22   Cassi Carballo MD   albuterol-ipratropium (DUO-NEB) 2.5 mg-0.5 mg/3 ml nebu USE 3 ML VIA NEBULIZER EVERY 6 HOURS AS NEEDED FOR WHEEZING 11/23/22   Cassi Carballo MD   ALPRAZolam (XANAX) 0.5 mg tablet TAKE 1 TABLET BY MOUTH TWICE DAILY AS NEEDED FOR ANXIETY. MAX DAILY AMOUNT: 1 MG 11/23/22   Hemant Carballo MD   budesonide (PULMICORT) 0.5 mg/2 mL nbsp Take 500 mcg by inhalation two (2) times a day. Indications: worsening of debilitating chronic lung disease called COPD    Provider, Historical   OXYGEN-AIR DELIVERY SYSTEMS 3 L/min by Nasal route continuous.  Indications: SHORTNESS OF BREATH    Provider, Historical   clopidogreL (PLAVIX) 75 mg tab TAKE 1 TABLET BY MOUTH DAILY 9/9/22   Mark Lowry NP   famotidine (PEPCID) 40 mg tablet TAKE 1 TABLET BY MOUTH EVERY DAY 9/9/22   Mark Lowry NP   metoclopramide HCl (REGLAN) 5 mg tablet TAKE 1 TABLET BY MOUTH THREE TIMES DAILY BEFORE MEALS 9/9/22   Mara Priest NP   escitalopram oxalate (LEXAPRO) 20 mg tablet TAKE 1 TABLET BY MOUTH EVERY DAY AS NEEDED FOR ANXIETY  Patient taking differently: TAKE 1 TABLET BY MOUTH EVERY DAY FOR ANXIETY 9/9/22   Mark Lowry NP   gabapentin (NEURONTIN) 300 mg capsule One in the AM and two at night  Indications: neuropathic pain  Patient taking differently: Take 300 mg by mouth two (2) times a day. Indications: neuropathic pain 8/22/22   Manfred Carballo MD   bumetanide (BUMEX) 0.5 mg tablet TAKE 1 TABLET BY MOUTH DAILY  Patient taking differently: Take 0.5 mg by mouth daily as needed. Indications: visible water retention 7/13/22   Mark Lowry NP   aspirin delayed-release 81 mg tablet Take 81 mg by mouth daily. 10/2/20   Provider, Historical   nitroglycerin (NITROSTAT) 0.4 mg SL tablet 0.4 mg by SubLINGual route every five (5) minutes as needed for Chest Pain.     Provider, Historical     Allergies   Allergen Reactions    Lemon Oil Nausea and Vomiting    Amoxil [Amoxicillin] Diarrhea    Anoro Ellipta [Umeclidinium-Vilanterol] Rash    Codeine Nausea and Vomiting    Fluticasone-Umeclidin-Vilanter Rash    Metoprolol Nausea and Vomiting     Past Medical History:   Diagnosis Date    Acute respiratory failure with hypoxia ([address] 01/2020    Adenoma of right lung 2018    CAD (coronary artery disease)     stents 2005 and 2009    Depression     Esophageal ulcer 01/2020    GERD (gastroesophageal reflux disease)     Hypercholesterolemia     elevated particle number    Hypertension     MI (myocardial infarction) ([address] 2005, 2009    Non-small cell cancer of right lung ([address] 08/2015    Osteoarthritis     Over weight 8/21/2017    PAD (peripheral artery disease) ([facility] 2017    TIA (transient ischemic attack)     Tobacco abuse 2/28/2014     Past Surgical History:   Procedure Laterality Date    HAND/FINGER SURGERY UNLISTED      left hand     HX CHOLECYSTECTOMY  2002    HX COLONOSCOPY  06/2015    HX ENDOSCOPY  2014, 2020    HX HEART CATHETERIZATION  2005, 2009    HX HIP REPLACEMENT Bilateral     HX PATRICIA AND BSO  2007        HX VASCULAR ANGIOPLASTY Bilateral 2017    right SFA, left politeal artery     Family History   Problem Relation Age of Onset    Cancer Mother         breast,ovarian colon     Hypertension Father     Stroke Father     Cancer Maternal Grandmother         colon    Cancer Maternal Grandfather      Social History     Tobacco Use    Smoking status: Every Day     Packs/day: 1.00     Years: 40.00     Pack years: 40.00     Types: Cigarettes     Last attempt to quit: 1/7/2020     Years since quitting: 3.1    Smokeless tobacco: Never   Substance Use Topics    Alcohol use: No        ROS    PHYSICAL EXAMINATION:  Vital Signs: (As obtained by patient/caregiver at home)  There were no vitals taken for this visit.      Constitutional: [x] Appears well-developed and well-nourished [x] No apparent distress      [] Abnormal -     Mental status: [x] Alert and awake  [x] Oriented to person/place/time [x] Able to follow commands    [] Abnormal -     Eyes:   EOM    [x]  Normal    [] Abnormal -   Sclera  [x]  Normal    [] Abnormal -          Discharge [x]  None visible   [] Abnormal -     HENT: [x] Normocephalic, atraumatic  [] Abnormal -   [x] Mouth/Throat: Mucous membranes are moist    External Ears [x] Normal  [] Abnormal -    Neck: [x] No visualized mass [] Abnormal -     Pulmonary/Chest: [x] Respiratory effort normal   [x] No visualized signs of difficulty breathing or respiratory distress        [] Abnormal -      Musculoskeletal:   [x] Normal gait with no signs of ataxia         [x] Normal range of motion of neck        [] Abnormal -     Neurological:        [x] No Facial Asymmetry (Cranial nerve 7 motor function) (limited exam due to video visit)          [x] No gaze palsy        [] Abnormal -          Skin:        [x] No significant exanthematous lesions or discoloration noted on facial skin         [] Abnormal -            Psychiatric:       [x] Normal Affect [] Abnormal -        [x] No Hallucinations    Other pertinent observable physical exam findings:-    Assessment & Plan:   Diagnoses and all orders for this visit:    1. Encounter for screening mammogram for malignant neoplasm of breast                We discussed the expected course, resolution and complications of the diagnosis(es) in detail. Medication risks, benefits, costs, interactions, and alternatives were discussed as indicated. I advised her to contact the office if her condition worsens, changes or fails to improve as anticipated. She expressed understanding with the diagnosis(es) and plan. Pursuant to the emergency declaration under the [address] and [place] [address] waiver authority and the [employer] Act, this Virtual Visit was conducted, with patient's consent, to reduce the patient's risk of exposure to COVID-19 and provide continuity of care for an established patient. Services were provided through a video synchronous discussion virtually to substitute for in-person clinic visit. Written by tracey Voss, as dictated by Yves Mac M.D.    10:06 AM - ***TIME HERE***    Total time spent with the patient *** minutes, greater than 50% of time spent counseling patient.

## 2023-02-21 ENCOUNTER — PATIENT OUTREACH (OUTPATIENT)
Dept: CASE MANAGEMENT | Age: 53
End: 2023-02-21

## 2023-02-21 NOTE — PROGRESS NOTES
Ambulatory Care Management Note    Date/Time:  2/21/2023 11:51 AM    Patient Current Location: [place]    Patient has graduated from the Complex Case Management  program on 02/21/23  Patient/family has the ability to self-manage at this time. Care management goals have been completed. No further Ambulatory Care Manager follow up scheduled. Goals Addressed                   This Visit's Progress     COMPLETED: Patient verbalizes understanding of self-management goals of living with COPD.        10/19/22  Pt seen at [facility] ER 10/14/22 dx: COPD exacerbation. O2 sats on arrival in 60's on room air. Pt prescribed prednisone and zithromax. Pt states she is currently on 4 LPM continuous. Reports previously on 3 LPM. \"Feeling a bit better\" requesting refill on both meds. ACM printed discharge summary for PCP review. Pt was advised to follow up with pulmonary and PCP. ACM contacted PAR requesting last o/v note. Pt advised to schedule ED follow up/med refill request with PAR. Pt will call and schedule ED follow up with PAR. ACM outreach 7-14 days. PM  10/27/22  LVM requesting return call. ACM to update goals. PM   11/10/22  Pt states she feels bronchitis is worsening. Reports SOB w/wo exertion. Productive cough. 4 lpm continuous via NC. Pt is requesting refill on prednisone and zithromax. Pt scheduled for VV 11/14/22. Previously prescribed during ER encounter 10/14/22. Pt has not yet heard back from [address] regarding scheduling follow up appointment. ACM advised pt to call PAR again. 21-30 days. PM   12/12/22  Attended 11/14/22 VV as scheduled. Pt seen by PCP 11/22/22. Pt to follow up in 3mos. Followed by Dr Shana Dang. CT revealed new masses to right and left lungs. Scheduled 12/1/22 to set up radiation. ACM to inquire rad/chemo at next outreach. 21-30 days. PM   02/21/23  VV w/ PCP completed 1/23/23 &  2/20/23. 1/23 note reviewed. Pt reported improvement with breathing. Working with PT. Pt scheduled 4/24/23 w/ PCP.   PM Patient has Ambulatory Care Manager's contact information for any further questions, concerns, or needs.   Patients upcoming visits:    Future Appointments   Date Time Provider Stan Schroeder   4/24/2023  5:00 PM Bernadine Osman MD [facility]

## 2023-02-23 DIAGNOSIS — F41.9 ANXIETY: ICD-10-CM

## 2023-02-26 RX ORDER — ALPRAZOLAM 0.5 MG/1
TABLET ORAL
Qty: 60 TABLET | Refills: 0 | Status: SHIPPED | OUTPATIENT
Start: 2023-02-26

## 2023-03-07 DIAGNOSIS — G25.81 RLS (RESTLESS LEGS SYNDROME): ICD-10-CM

## 2023-03-08 RX ORDER — GABAPENTIN 300 MG/1
CAPSULE ORAL
Qty: 270 CAPSULE | Refills: 0 | Status: SHIPPED | OUTPATIENT
Start: 2023-03-08

## 2023-03-15 DIAGNOSIS — E78.00 HYPERCHOLESTEROLEMIA: ICD-10-CM

## 2023-03-15 NOTE — TELEPHONE ENCOUNTER
Last Visit: 2/20/23 with MD Anna Sue  Next Appointment: 4/24/23 with MD Anna Sue  Previous Refill Encounter(s): 9/9/22 Lexapro & Pepcid #90 with 1 refill, 12/7/22 Zocor #90    Requested Prescriptions     Pending Prescriptions Disp Refills    simvastatin (ZOCOR) 40 mg tablet [Pharmacy Med Name: SIMVASTATIN 40MG TABLETS] 90 Tablet 1     Sig: TAKE 1 TABLET BY MOUTH EVERY NIGHT    escitalopram oxalate (LEXAPRO) 20 mg tablet 90 Tablet 1     Sig: TAKE 1 TABLET BY MOUTH EVERY DAY FOR ANXIETY    famotidine (PEPCID) 40 mg tablet 90 Tablet 1     Sig: Take 1 Tablet by mouth daily. For Pharmacy Admin Tracking Only    Program: Medication Refill  CPA in place:   Recommendation Provided To:    Intervention Detail: New Rx: 3, reason: Patient Preference  Intervention Accepted By:   Zora Snow Closed?:   Time Spent (min): 5

## 2023-03-17 RX ORDER — FAMOTIDINE 40 MG/1
40 TABLET, FILM COATED ORAL DAILY
Qty: 90 TABLET | Refills: 1 | Status: SHIPPED | OUTPATIENT
Start: 2023-03-17

## 2023-03-17 RX ORDER — ESCITALOPRAM OXALATE 20 MG/1
TABLET ORAL
Qty: 90 TABLET | Refills: 1 | Status: SHIPPED | OUTPATIENT
Start: 2023-03-17

## 2023-03-17 RX ORDER — SIMVASTATIN 40 MG/1
TABLET, FILM COATED ORAL
Qty: 90 TABLET | Refills: 1 | Status: SHIPPED | OUTPATIENT
Start: 2023-03-17

## 2023-03-24 DIAGNOSIS — I73.9 PAD (PERIPHERAL ARTERY DISEASE) (HCC): ICD-10-CM

## 2023-03-24 RX ORDER — CLOPIDOGREL BISULFATE 75 MG/1
75 TABLET ORAL DAILY
Qty: 90 TABLET | Refills: 1 | Status: SHIPPED | OUTPATIENT
Start: 2023-03-24

## 2023-03-24 NOTE — TELEPHONE ENCOUNTER
Request for refill clopidogrel. States second request.  (First may have gone to [facility] as last prescriber). Thanks, Ashley    Last Visit: VV 2/20/23 MD Carballo  Next Appointment: 4/24/23 MD Carballo  Previous Refill Encounter(s): 9/9/22 90 + 1    Requested Prescriptions     Pending Prescriptions Disp Refills    clopidogreL (PLAVIX) 75 mg tab 90 Tablet 1     Sig: Take 1 Tablet by mouth daily. For Pharmacy Admin Tracking Only    Program: Medication Refill  CPA in place:   Recommendation Provided To:    Intervention Detail: New Rx: 1, reason: Patient Preference  Intervention Accepted By:   Sarah Gutierrez Closed?:   Time Spent (min): 5

## 2023-03-28 ENCOUNTER — TELEPHONE (OUTPATIENT)
Dept: FAMILY MEDICINE CLINIC | Age: 53
End: 2023-03-28

## 2023-03-28 NOTE — TELEPHONE ENCOUNTER
Called wants to know if provider received order to extend physical therapy order # [id number].     Requesting a call back    Best call back #[phone]

## 2023-03-29 NOTE — TELEPHONE ENCOUNTER
TC to advise I have not received the order. They will re-fax it. Advised DR. Gatito Fisher will return to clinic on Monday will have orders signed then

## 2023-04-24 ENCOUNTER — VIRTUAL VISIT (OUTPATIENT)
Dept: FAMILY MEDICINE CLINIC | Age: 53
End: 2023-04-24
Payer: MEDICARE

## 2023-04-24 DIAGNOSIS — F41.9 ANXIETY: ICD-10-CM

## 2023-04-24 DIAGNOSIS — R52 ACUTE PAIN: ICD-10-CM

## 2023-04-24 DIAGNOSIS — G25.81 RLS (RESTLESS LEGS SYNDROME): ICD-10-CM

## 2023-04-24 DIAGNOSIS — T24.291A 2ND DEGREE BURN OF MULTIPLE SITES OF RIGHT LEG EXCEPT ANKLE AND FOOT, INITIAL ENCOUNTER: Primary | ICD-10-CM

## 2023-04-24 PROCEDURE — G9899 SCRN MAM PERF RSLTS DOC: HCPCS | Performed by: FAMILY MEDICINE

## 2023-04-24 PROCEDURE — G9717 DOC PT DX DEP/BP F/U NT REQ: HCPCS | Performed by: FAMILY MEDICINE

## 2023-04-24 PROCEDURE — G8427 DOCREV CUR MEDS BY ELIG CLIN: HCPCS | Performed by: FAMILY MEDICINE

## 2023-04-24 PROCEDURE — 99214 OFFICE O/P EST MOD 30 MIN: CPT | Performed by: FAMILY MEDICINE

## 2023-04-24 PROCEDURE — 3017F COLORECTAL CA SCREEN DOC REV: CPT | Performed by: FAMILY MEDICINE

## 2023-04-24 NOTE — PROGRESS NOTES
HPI  Abiodun Sewell 45 y.o. female  presents to the office today ***    There were no vitals taken for this visit. There is no height or weight on file to calculate BMI. No chief complaint on file. ***    Current Outpatient Medications   Medication Sig Dispense Refill    ALPRAZolam (XANAX) 0.5 mg tablet TAKE 1 TABLET BY MOUTH TWICE DAILY AS NEEDED FOR ANXIETY. MAX DAILY AMOUNT: 1 MG 60 Tablet 0    clopidogreL (PLAVIX) 75 mg tab Take 1 Tablet by mouth daily. 90 Tablet 1    simvastatin (ZOCOR) 40 mg tablet TAKE 1 TABLET BY MOUTH EVERY NIGHT 90 Tablet 1    escitalopram oxalate (LEXAPRO) 20 mg tablet TAKE 1 TABLET BY MOUTH EVERY DAY FOR ANXIETY 90 Tablet 1    famotidine (PEPCID) 40 mg tablet Take 1 Tablet by mouth daily. 90 Tablet 1    gabapentin (NEURONTIN) 300 mg capsule TAKE 1 CAPSULE BY MOUTH IN THE MORNING AND 2 CAPSULES AT NIGHT 270 Capsule 0    rOPINIRole (REQUIP) 0.5 mg tablet Take 1 Tablet by mouth three (3) times daily. 270 Tablet 1    predniSONE (DELTASONE) 10 mg tablet Take 10 mg by mouth daily. 30 Tablet 0    albuterol (PROVENTIL HFA, VENTOLIN HFA, PROAIR HFA) 90 mcg/actuation inhaler Take 2 Puffs by inhalation four (4) times daily. 8.5 g 5    pantoprazole (PROTONIX) 40 mg tablet TAKE 1 TABLET BY MOUTH DAILY 90 Tablet 0    albuterol-ipratropium (DUO-NEB) 2.5 mg-0.5 mg/3 ml nebu USE 3 ML VIA NEBULIZER EVERY 6 HOURS AS NEEDED FOR WHEEZING 90 mL 5    budesonide (PULMICORT) 0.5 mg/2 mL nbsp Take 500 mcg by inhalation two (2) times a day. Indications: worsening of debilitating chronic lung disease called COPD      OXYGEN-AIR DELIVERY SYSTEMS 3 L/min by Nasal route continuous. Indications: SHORTNESS OF BREATH      metoclopramide HCl (REGLAN) 5 mg tablet TAKE 1 TABLET BY MOUTH THREE TIMES DAILY BEFORE MEALS 270 Tablet 1    bumetanide (BUMEX) 0.5 mg tablet TAKE 1 TABLET BY MOUTH DAILY (Patient taking differently: Take 0.5 mg by mouth daily as needed.  Indications: visible water retention) 90 Tablet 0 aspirin delayed-release 81 mg tablet Take 81 mg by mouth daily. nitroglycerin (NITROSTAT) 0.4 mg SL tablet 0.4 mg by SubLINGual route every five (5) minutes as needed for Chest Pain.        Allergies   Allergen Reactions    Lemon Oil Nausea and Vomiting    Amoxil [Amoxicillin] Diarrhea    Anoro Ellipta [Umeclidinium-Vilanterol] Rash    Codeine Nausea and Vomiting    Fluticasone-Umeclidin-Vilanter Rash    Metoprolol Nausea and Vomiting     Past Medical History:   Diagnosis Date    Acute respiratory failure with hypoxia ([facility] 01/2020    Adenoma of right lung 2018    CAD (coronary artery disease)     stents 2005 and 2009    Depression     Esophageal ulcer 01/2020    GERD (gastroesophageal reflux disease)     Hypercholesterolemia     elevated particle number    Hypertension     MI (myocardial infarction) ([facility] [address] 2005, 2009    Non-small cell cancer of right lung ([facility] [address] 08/2015    Osteoarthritis     Over weight 8/21/2017    [facility]    TIA (transient ischemic attack)     Tobacco abuse 2/28/2014     Past Surgical History:   Procedure Laterality Date    HAND/FINGER SURGERY UNLISTED      left hand     HX CHOLECYSTECTOMY  2002    HX COLONOSCOPY  06/2015    HX ENDOSCOPY  2014, 2020    HX HEART CATHETERIZATION  2005, 2009    HX HIP REPLACEMENT Bilateral     HX PATRICIA AND BSO  2007        HX VASCULAR ANGIOPLASTY Bilateral 2017    right SFA, left politeal artery     Family History   Problem Relation Age of Onset    Cancer Mother         breast,ovarian colon     Hypertension Father     Stroke Father     Cancer Maternal Grandmother         colon    Cancer Maternal Grandfather      Social History     Tobacco Use    Smoking status: Every Day     Packs/day: 1.00     Years: 40.00     Pack years: 40.00     Types: Cigarettes     Last attempt to quit: 1/7/2020     Years since quitting: 3.2    Smokeless tobacco: Never   Substance Use Topics    Alcohol use: No        ROS    Physical Exam      ASSESSMENT and PLAN  {There are no diagnoses linked to this encounter. (Refresh or delete this SmartLink)}    ***           Medication risks/benefits/costs/interactions/alternatives discussed with patient. Advised patient to call back or return to office if symptoms worsen/change/persist.  If patient cannot reach us or should anything more severe/urgent arise he/she should proceed directly to the nearest emergency department. Discussed expected course/resolution/complications of diagnosis in detail with patient. Patient given a written after visit summary which includes diagnoses, current medications and vitals. Patient expressed understanding with the diagnosis and plan. Written by tracey Lynhc, as dictated by Shayy Pierre M.D.    4:25 PM - ***TIME HERE***    Total time spent with the patient *** minutes, greater than 50% of time spent counseling patient.
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
## 2023-05-16 ENCOUNTER — TELEPHONE (OUTPATIENT)
Age: 53
End: 2023-05-16

## 2023-05-22 DIAGNOSIS — F41.9 ANXIETY DISORDER, UNSPECIFIED TYPE: Primary | ICD-10-CM

## 2023-05-24 DIAGNOSIS — F41.9 ANXIETY DISORDER, UNSPECIFIED TYPE: Primary | ICD-10-CM

## 2023-05-24 NOTE — TELEPHONE ENCOUNTER
MD Garcia,    Patient call stating pharmacy has sent request w/no response. (Both were already pending). Patient requesting refill. Check . Thanks, Larisa    Last appointment: [facility] 4/25/23 Jose  Next appointment: None  Previous refill encounter(s):   Alprazolam 4/22/23 60  Pantoprazole 12/5/22 90    Requested Prescriptions     Pending Prescriptions Disp Refills    ALPRAZolam (XANAX) 0.5 MG tablet 60 tablet 0     Sig: Take 1 tablet by mouth 2 times daily as needed for Anxiety for up to 30 days.  Max Daily Amount: 1 mg    pantoprazole (PROTONIX) 40 MG tablet 90 tablet 1     Sig: Take 1 tablet by mouth daily     For Pharmacy Admin Tracking Only    Program: Medication Refill  Intervention Detail: New Rx: 2, reason: Patient Preference  Time Spent (min): 5

## 2023-05-25 RX ORDER — ALPRAZOLAM 0.5 MG/1
0.5 TABLET ORAL 2 TIMES DAILY PRN
Qty: 60 TABLET | Refills: 0 | Status: SHIPPED | OUTPATIENT
Start: 2023-05-25 | End: 2023-06-24

## 2023-05-25 RX ORDER — ALPRAZOLAM 0.5 MG/1
TABLET ORAL
Qty: 60 TABLET | Refills: 2 | Status: SHIPPED | OUTPATIENT
Start: 2023-05-25 | End: 2023-06-24

## 2023-05-25 RX ORDER — PANTOPRAZOLE SODIUM 40 MG/1
TABLET, DELAYED RELEASE ORAL
Qty: 90 TABLET | OUTPATIENT
Start: 2023-05-25

## 2023-05-25 RX ORDER — PANTOPRAZOLE SODIUM 40 MG/1
40 TABLET, DELAYED RELEASE ORAL DAILY
Qty: 90 TABLET | Refills: 1 | Status: SHIPPED | OUTPATIENT
Start: 2023-05-25

## 2023-05-25 RX ORDER — ALPRAZOLAM 0.5 MG/1
TABLET ORAL
Qty: 60 TABLET | OUTPATIENT
Start: 2023-05-25

## 2023-05-29 ENCOUNTER — TELEPHONE (OUTPATIENT)
Age: 53
End: 2023-05-29

## 2023-05-29 NOTE — TELEPHONE ENCOUNTER
Received a call from [address] regarding date of patient. Phone number for officer [phone]. According to officer Carlos Putnam, he received a call from patient's niece who found her on floor of bathroom, unresponsive. Also there where signs of vomiting blood in bathroom sink. EMS was called and they found patient completely unresponsive and they tried initial CPR. According to , they did not find any suspicious evidences in the home. No signs of head injury or other physical injury. We discussed patient's past medical history including end-stage COPD, history of lung cancer, CHF, depression and officers requested if Dr. Gogo Arita can sign death certificate for patient. After I confirmed with  that if there was no evidence or suspicion about suicide or homicide, I will send a message to Dr. Gogo Arita to complete death certificate. If Dr. Gogo Arita will have any doubt, he will . Officer [employer] appreciated call.   Thanks

## 2023-06-02 ENCOUNTER — TELEPHONE (OUTPATIENT)
Age: 53
End: 2023-06-02

## 2023-06-02 NOTE — TELEPHONE ENCOUNTER
Abiel Santiago home call want to know if provider could sign death certificate.     Requesting a call back    Best call back #[phone]

## 2023-06-05 ENCOUNTER — TELEPHONE (OUTPATIENT)
Age: 53
End: 2023-06-05

## 2023-06-05 NOTE — TELEPHONE ENCOUNTER
[facility] called to check to make sure that the provider sign death certificate. The family is awaiting .     Best call back [phone]

## 2024-08-06 NOTE — ASSESSMENT & PLAN NOTE
Josiah from  Medicine calling looking for an update on the medical records request they faxed over yesterday. Advised him it was faxed back over to their office today, he will give office a call if anything.   Stable, based on history, physical exam and review of pertinent labs, studies and medications; meds reconciled; continue current treatment plan, lifestyle modifications recommended.

## 2024-08-16 NOTE — TELEPHONE ENCOUNTER
TC to Patient. ID verified. Calling for condition update. Patient advises she did go to [facility] via ambulance was admitted with CHF is improving may go home today or tomorrow. Patient declined to schedule appointment at this time . She will call me when she gets home to schedule. Satisfactory

## 2025-01-03 NOTE — TELEPHONE ENCOUNTER
[phone] Spoke to patient Identified pt with two pt identifiers (Name @ )  Notified patient of her lab results and understand  Pend medication and labs to Dr Mila Abel     Per Patient she also discussed with you Restless leg syndrome and your going to prescribe something like Gabapentin but she said your looking in to it I advised will send message to Dr Mila Abel patient understand Subjective Data: Patient sitting upright in bed. NAEON. She reports improvement in SOB. Denies CP at this time. She has not gone to restroom this morning as she has external cath. She believes her normal weight is around 225lbs.     - Await TTE  - IV Lasix 80mg x1  - Limited medications due to renal function, may consider SGLT2i at later point  - Wean O2 as able    Objective Data:  Last Recorded Vitals:  Vitals:    01/02/25 1939 01/03/25 0150 01/03/25 0543 01/03/25 0735   BP: 170/88 147/83 154/83    BP Location: Left arm Left arm Left arm    Patient Position: Lying Lying Lying    Pulse: 78 71 70    Resp: 18 18 18    Temp: 36.4 °C (97.5 °F) 36.9 °C (98.4 °F) 37 °C (98.6 °F)    TempSrc: Temporal Temporal Temporal    SpO2: 95% 97% 92%    Weight:    107 kg (236 lb 4.8 oz)   Height:           Last Labs:  CBC - 1/2/2025:  6:35 PM  5.9 11.1 369    33.3      CMP - 1/2/2025:  6:35 PM  9.0 6.9 14 --- 0.4   4.2 3.9 12 114      PTT - 4/15/2024:  9:28 PM  0.9   10.3 31     TROPHS   Date/Time Value Ref Range Status   01/02/2025 12:53 PM 21 0 - 34 ng/L Final   02/03/2024 01:22 AM 12 0 - 34 ng/L Final   01/24/2024 08:32 PM 25 0 - 34 ng/L Final   01/25/2023 04:58 PM 7 0 - 34 ng/L Final     Comment:     .  Less than 99th percentile of normal range cutoff-  Female and children under 18 years old <35 ng/L; Male <54 ng/L: Negative  Repeat testing should be performed if clinically indicated.   .  Female and children under 18 years old  ng/L; Male  ng/L:  Consistent with possible cardiac damage and possible increased clinical   risk. Serial measurements may help to assess extent of myocardial damage.   .  >120 ng/L: Consistent with cardiac damage, increased clinical risk and  myocardial infarction. Serial measurements may help assess extent of   myocardial damage.   .   NOTE: Children less than 1 year old may have higher baseline troponin   levels and results should be interpreted in conjunction with the overall   clinical  context.  .  NOTE: Troponin I testing is performed using a different   testing methodology at [facility] than at other   system [facility]. Direct result comparisons should only   be made within the same method.     01/25/2023 04:16 PM 8 0 - 34 ng/L Final     Comment:     .  Less than 99th percentile of normal range cutoff-  Female and children under 18 years old <35 ng/L; Male <54 ng/L: Negative  Repeat testing should be performed if clinically indicated.   .  Female and children under 18 years old  ng/L; Male  ng/L:  Consistent with possible cardiac damage and possible increased clinical   risk. Serial measurements may help to assess extent of myocardial damage.   .  >120 ng/L: Consistent with cardiac damage, increased clinical risk and  myocardial infarction. Serial measurements may help assess extent of   myocardial damage.   .   NOTE: Children less than 1 year old may have higher baseline troponin   levels and results should be interpreted in conjunction with the overall   clinical context.  .  NOTE: Troponin I testing is performed using a different   testing methodology at [facility] than at other   [facility] hospitals. Direct result comparisons should only   be made within the same method.       BNP   Date/Time Value Ref Range Status   01/02/2025 12:53  0 - 99 pg/mL Final   02/03/2024 01:22  0 - 99 pg/mL Final     HGBA1C   Date/Time Value Ref Range Status   01/02/2025 06:35 PM 8.7 See comment % Final   11/19/2024 02:06 PM 9 4.2 - 6.5 % Final   07/19/2024 08:52 AM 11.2 4.2 - 6.5 % Final     VLDL   Date/Time Value Ref Range Status   05/08/2023 09:35 AM 38 0 - 40 mg/dL Final   04/12/2021 09:06 AM 21 0 - 40 mg/dL Final   10/20/2020 12:36 PM 24 0 - 40 mg/dL Final      Last I/O:  I/O last 3 completed shifts:  In: 960 (9.6 mL/kg) [P.O.:960]  Out: 2025 (20.3 mL/kg) [Urine:2025 (0.6 mL/kg/hr)]  Weight: 99.8 kg     Past Cardiology Tests (Last 3  "Years):    Echo:  Transthoracic Echo (TTE) Complete 01/25/2024     PHYSICIAN INTERPRETATION:  Left Ventricle: The left ventricular systolic function is hyperdynamic, with an estimated ejection fraction of 70-75%. There are no regional wall motion abnormalities. The left ventricular cavity size is normal. The left ventricular septal wall thickness is mildly increased. There is mild concentric left ventricular hypertrophy. Spectral Doppler shows a normal pattern of left ventricular diastolic filling.  Left Atrium: The left atrium is mildly dilated.  Right Ventricle: The right ventricle was not well visualized. There is normal right ventricular global systolic function.  Right Atrium: The right atrium is normal in size.  Aortic Valve: The aortic valve is trileaflet. There is no evidence of aortic valve regurgitation. The peak instantaneous gradient of the aortic valve is 10.0 mmHg. The mean gradient of the aortic valve is 5.0 mmHg.  Mitral Valve: The mitral valve is normal in structure. There is trace to mild mitral valve regurgitation.  Tricuspid Valve: The tricuspid valve is structurally normal. There is trace to mild tricuspid regurgitation. The Doppler estimated RVSP is within normal limits at 32.4 mmHg.  Pulmonic Valve: The pulmonic valve is structurally normal. There is physiologic pulmonic valve regurgitation.  Pericardium: There is a trivial pericardial effusion.  Pleural: There is left pleural effusion.  Aorta: The aortic root is normal.      CONCLUSIONS:  1. Left ventricular systolic function is hyperdynamic with a 70-75% estimated ejection fraction.  2. RVSP within normal limits.    ** Final **      Ejection Fractions:  No results found for: \"EF\"  Cath:  No results found for this or any previous visit from the past 1095 days.    Stress Test:  No results found for this or any previous visit from the past 1095 days.    Cardiac Imaging:  No results found for this or any previous visit from the past 1095 " days.      Inpatient Medications:  Scheduled medications   Medication Dose Route Frequency    amLODIPine  10 mg oral Daily    aspirin  81 mg oral Daily    atorvastatin  40 mg oral Nightly    cloNIDine  0.1 mg oral BID    folic acid  1 mg oral Daily    furosemide  80 mg intravenous Once    gabapentin  300 mg oral BID    heparin (porcine)  7,500 Units subcutaneous q8h NEHEMIAS    insulin glargine  32 Units subcutaneous Daily    insulin lispro  0-10 Units subcutaneous TID AC    perflutren lipid microspheres  0.5-10 mL of dilution intravenous Once in imaging    polyethylene glycol  17 g oral Daily    vitamin B complex-vitamin C-folic acid  1 capsule oral Daily     PRN medications   Medication    acetaminophen    albuterol    dextrose    dextrose    glucagon    glucagon    hydrALAZINE    oxygen     Continuous Medications   Medication Dose Last Rate       Physical Exam:  General: NAD, well appearing, alert  Skin: warm and dry throughout, no abrasions or ecchymosis  Head/ neck: + mid neck JVD seen at 45  Cardiac: RRR, no murmurs or rubs  Pulm: Crackles bilateral bases, on 4L NC  GI: soft, nontender, non-distended, +BS  Extremities: 2+ pitting to knee  Neuro: no focal neuro deficits, A&Ox3  Psych: appropriate mood and behavior, pleasant          Assessment/Plan   Nuris Squires is a 58 y.o. female presenting with PMH of poorly controlled insulin dependent type 2 diabetes c/b neuropathy, hyperlipidemia, PAD, CKD stage IV, hypertension, multiple foot infections s/p partial R great toe amputation, fall w/L femur fracture s/p ORIF (4/2024) that presents to the emergency department today for shortness of breath x1 day. Admitted under [facility] Service for further management of acute ADHF and HTN Urgency     Acute diastolic heart failure  ADHF  Chest tightness, resolved  - likely 2/2 uncontrolled HTN  - TTE (1/2024): EF 70-75%  - neg stress 10/2019  - CXR: Constellation of findings is suggestive of congestive heart failure  -  ISO  obesity   - HS trop 21, no ischemic changes on ECG, no c/f ACS  - admit weight: 107kg  - dry weight ~100kg  - of note, pharmacy verified patient on PO lasix 20mg daily at home (?for unclear reasons, presumably prescribed by PCP for darline swelling)  - IV Lasix 80mg x1  - repeat TTE pending  - Unable to add robyn due to renal function  - May consider SGLT2i later into admission  - Daily standing weights, strict I&O's, 2g sodium diet, 2L fluid restriction      Acute hypoxic respiratory failure iso of ADHF exacerbation  - p.ox initially at 61% but improved rapidly with placement of nasal cannula  - She was requiring 6 L of oxygen, BiPAP briefly due to the concern for SCAPE, received SL nitro and she was able to wean off of BiPAP  - Currently back on nasal cannula at 4 L (RA at baseline), satting >95%  - Flu A/B/RSV/COVID negative  - diuresis plan as above     HTN urgency, improving  - BP on on admit: 220/98  - SBP improved to 143 after resumption of home meds and s/p IVP Hydral 5mg x1  - has headache & lower back pain, tylenol PRN  - cont. Home meds: clonidine 0.1mg BID, amlodipine 10mg daily  - Further BP medication adjustments as able  - PRN IVP Hydral 5mg q6hrs SBP>180     Insulin dependent Type 2 diabetes  Poorly controlled  - Follow with clinical pharmacy closely/PCP  - Hgb A1c 8.7% (improved from previous)  - home regimen: Mounjaro 2.5mg weekly,basaglar 32 units every morning, admelog SS with meals  - c/w lantus 32 units every morning and SSI #2 while inpatient  - OARRS reviewed, cont. Home gabapentin 300mg BID      HLD  PAD  - c/w asa 81mg daily, atorva 40mg daily      CKD IV  - Cr last year 1.7-5.3, worsening since 2022  - admit Cr 2.34  - likely cardiorenal 2/2 ADHF, diuresis plan as above  - trend daily        DVT ppx: subQ heparin  Emergency contacts: Boyfrienterrie Rousseau #[phone]  Brother Luis Squires #[phone]  DISPO: pending further diuresis/HF med optimization     All labs, vital signs, tests &  imaging results, and medications were reviewed.       Code Status:  Full Code    I spent 30+ minutes in the professional and overall care of this patient.    Patient seen and discussed with Dr. Vidal Harris PA-C

## (undated) DEVICE — Z CONVERTED USE 2274305 CUFF BLD PRSS AD L SZ 12 FOR 32-43CM LIMB VLY SFT W/O TUBE

## (undated) DEVICE — CATH IV AUTOGRD BC BLU 22GA 25 -- INSYTE

## (undated) DEVICE — BAG BELONG PT PERS CLEAR HANDL

## (undated) DEVICE — (D)CUP DENT 7.5OZ PLAS DSTY -- DISC BY MFR USE ITEM 341725

## (undated) DEVICE — BLOCK BITE ENDO --

## (undated) DEVICE — ENDO CARRY-ON PROCEDURE KIT INCLUDES ENZYMATIC SPONGE, GAUZE, BIOHAZARD LABEL, TRAY, LUBRICANT, DIRTY SCOPE LABEL, WATER LABEL, TRAY, DRAWSTRING PAD, AND DEFENDO 4-PIECE KIT.: Brand: ENDO CARRY-ON PROCEDURE KIT

## (undated) DEVICE — NEONATAL-ADULT SPO2 SENSOR: Brand: NELLCOR

## (undated) DEVICE — 1200 GUARD II KIT W/5MM TUBE W/O VAC TUBE: Brand: GUARDIAN

## (undated) DEVICE — SOLIDIFIER FLUID 3000 CC ABSORB

## (undated) DEVICE — SYRINGE MED 20ML STD CLR PLAS LUERLOCK TIP N CTRL DISP

## (undated) DEVICE — Device: Brand: MEDICAL ACTION INDUSTRIES

## (undated) DEVICE — NEEDLE HYPO 18GA L1.5IN PNK S STL HUB POLYPR SHLD REG BVL

## (undated) DEVICE — Device: Brand: SINGLE USE SOFT BRUSH

## (undated) DEVICE — KENDALL RADIOLUCENT FOAM MONITORING ELECTRODE -RECTANGULAR SHAPE: Brand: KENDALL

## (undated) DEVICE — NEEDLE BX DIA22GA FN ENDOSCP SHARKCORE

## (undated) DEVICE — Z DISCONTINUED NO SUB IDED SET EXTN W/ 4 W STPCOCK M SPIN LOK 36IN

## (undated) DEVICE — SET ADMIN 16ML TBNG L100IN 2 Y INJ SITE IV PIGGY BK DISP

## (undated) DEVICE — BW-400L DISP SNGL-END CLEANINGBRUSH: Brand: OLYMPUS

## (undated) DEVICE — KIT COMPLIANCE W ENDOGLDE + 11 NO BRSH ENDOKT

## (undated) DEVICE — SYSTEM BX 25GA FN NDL SIX DST CUT EDG SHARKCORE

## (undated) DEVICE — CANN NASAL O2 CAPNOGRAPHY AD -- FILTERLINE